# Patient Record
Sex: MALE | Race: WHITE | Employment: UNEMPLOYED | URBAN - METROPOLITAN AREA
[De-identification: names, ages, dates, MRNs, and addresses within clinical notes are randomized per-mention and may not be internally consistent; named-entity substitution may affect disease eponyms.]

---

## 2018-06-17 PROBLEM — R41.82 MENTAL STATUS CHANGE: Status: ACTIVE | Noted: 2018-06-17

## 2019-06-22 ENCOUNTER — APPOINTMENT (OUTPATIENT)
Dept: CT IMAGING | Age: 84
End: 2019-06-22
Payer: COMMERCIAL

## 2019-06-22 ENCOUNTER — HOSPITAL ENCOUNTER (EMERGENCY)
Age: 84
Discharge: HOME | End: 2019-06-22
Payer: COMMERCIAL

## 2019-06-22 VITALS
TEMPERATURE: 97.5 F | BODY MASS INDEX: 27.92 KG/M2 | RESPIRATION RATE: 16 BRPM | HEIGHT: 70 IN | SYSTOLIC BLOOD PRESSURE: 186 MMHG | WEIGHT: 195 LBS | OXYGEN SATURATION: 98 % | HEART RATE: 67 BPM | DIASTOLIC BLOOD PRESSURE: 99 MMHG

## 2019-06-22 DIAGNOSIS — R93.5 ABNORMAL ABDOMINAL CT SCAN: ICD-10-CM

## 2019-06-22 DIAGNOSIS — R31.9 PAINLESS HEMATURIA: Primary | ICD-10-CM

## 2019-06-22 LAB
ALBUMIN SERPL-MCNC: 3.5 GM/DL (ref 3.4–5)
ALP BLD-CCNC: 83 IU/L (ref 40–129)
ALT SERPL-CCNC: 15 U/L (ref 10–40)
ANION GAP SERPL CALCULATED.3IONS-SCNC: 10 MMOL/L (ref 4–16)
APTT: 32.7 SECONDS (ref 21.2–33)
AST SERPL-CCNC: 34 IU/L (ref 15–37)
BACTERIA: NEGATIVE /HPF
BASOPHILS ABSOLUTE: 0 K/CU MM
BASOPHILS RELATIVE PERCENT: 0.4 % (ref 0–1)
BILIRUB SERPL-MCNC: 1.4 MG/DL (ref 0–1)
BILIRUBIN URINE: NEGATIVE MG/DL
BLOOD, URINE: ABNORMAL
BUN BLDV-MCNC: 13 MG/DL (ref 6–23)
CALCIUM SERPL-MCNC: 9.2 MG/DL (ref 8.3–10.6)
CHLORIDE BLD-SCNC: 98 MMOL/L (ref 99–110)
CLARITY: CLEAR
CO2: 27 MMOL/L (ref 21–32)
COLOR: YELLOW
CREAT SERPL-MCNC: 1.1 MG/DL (ref 0.9–1.3)
DIFFERENTIAL TYPE: ABNORMAL
EOSINOPHILS ABSOLUTE: 0.2 K/CU MM
EOSINOPHILS RELATIVE PERCENT: 3.1 % (ref 0–3)
GFR AFRICAN AMERICAN: >60 ML/MIN/1.73M2
GFR NON-AFRICAN AMERICAN: >60 ML/MIN/1.73M2
GLUCOSE BLD-MCNC: 97 MG/DL (ref 70–99)
GLUCOSE, URINE: NEGATIVE MG/DL
HCT VFR BLD CALC: 37.4 % (ref 42–52)
HEMOGLOBIN: 12.2 GM/DL (ref 13.5–18)
HYALINE CASTS: 10 /LPF
IMMATURE NEUTROPHIL %: 1 % (ref 0–0.43)
INR BLD: 1.2 INDEX
KETONES, URINE: NEGATIVE MG/DL
LEUKOCYTE ESTERASE, URINE: NEGATIVE
LYMPHOCYTES ABSOLUTE: 2.2 K/CU MM
LYMPHOCYTES RELATIVE PERCENT: 31.3 % (ref 24–44)
MCH RBC QN AUTO: 33.2 PG (ref 27–31)
MCHC RBC AUTO-ENTMCNC: 32.6 % (ref 32–36)
MCV RBC AUTO: 101.9 FL (ref 78–100)
MONOCYTES ABSOLUTE: 0.7 K/CU MM
MONOCYTES RELATIVE PERCENT: 9.3 % (ref 0–4)
MUCUS: ABNORMAL HPF
NITRITE URINE, QUANTITATIVE: NEGATIVE
NUCLEATED RBC %: 1.6 %
PDW BLD-RTO: 19.5 % (ref 11.7–14.9)
PH, URINE: 5 (ref 5–8)
PLATELET # BLD: 259 K/CU MM (ref 140–440)
PMV BLD AUTO: 9.8 FL (ref 7.5–11.1)
POTASSIUM SERPL-SCNC: 4.9 MMOL/L (ref 3.5–5.1)
PROTEIN UA: NEGATIVE MG/DL
PROTHROMBIN TIME: 13.9 SECONDS (ref 9.12–12.5)
RBC # BLD: 3.67 M/CU MM (ref 4.6–6.2)
RBC URINE: 461 /HPF (ref 0–3)
REASON FOR REJECTION: NORMAL
REASON FOR REJECTION: NORMAL
REJECTED TEST: NORMAL
SEGMENTED NEUTROPHILS ABSOLUTE COUNT: 3.8 K/CU MM
SEGMENTED NEUTROPHILS RELATIVE PERCENT: 54.9 % (ref 36–66)
SODIUM BLD-SCNC: 135 MMOL/L (ref 135–145)
SPECIFIC GRAVITY UA: 1.01 (ref 1–1.03)
TOTAL IMMATURE NEUTOROPHIL: 0.07 K/CU MM
TOTAL NUCLEATED RBC: 0.1 K/CU MM
TOTAL PROTEIN: 6.6 GM/DL (ref 6.4–8.2)
TRICHOMONAS: ABNORMAL /HPF
UROBILINOGEN, URINE: NORMAL MG/DL (ref 0.2–1)
WBC # BLD: 7 K/CU MM (ref 4–10.5)
WBC UA: 2 /HPF (ref 0–2)

## 2019-06-22 PROCEDURE — 74177 CT ABD & PELVIS W/CONTRAST: CPT

## 2019-06-22 PROCEDURE — 80053 COMPREHEN METABOLIC PANEL: CPT

## 2019-06-22 PROCEDURE — 81001 URINALYSIS AUTO W/SCOPE: CPT

## 2019-06-22 PROCEDURE — 6360000004 HC RX CONTRAST MEDICATION: Performed by: PHYSICIAN ASSISTANT

## 2019-06-22 PROCEDURE — 85610 PROTHROMBIN TIME: CPT

## 2019-06-22 PROCEDURE — 87077 CULTURE AEROBIC IDENTIFY: CPT

## 2019-06-22 PROCEDURE — 87086 URINE CULTURE/COLONY COUNT: CPT

## 2019-06-22 PROCEDURE — 99284 EMERGENCY DEPT VISIT MOD MDM: CPT

## 2019-06-22 PROCEDURE — 2580000003 HC RX 258: Performed by: PHYSICIAN ASSISTANT

## 2019-06-22 PROCEDURE — 85730 THROMBOPLASTIN TIME PARTIAL: CPT

## 2019-06-22 PROCEDURE — 85025 COMPLETE CBC W/AUTO DIFF WBC: CPT

## 2019-06-22 RX ORDER — SODIUM CHLORIDE 9 MG/ML
INJECTION, SOLUTION INTRAVENOUS CONTINUOUS
Status: DISCONTINUED | OUTPATIENT
Start: 2019-06-22 | End: 2019-06-22 | Stop reason: HOSPADM

## 2019-06-22 RX ORDER — SODIUM CHLORIDE 0.9 % (FLUSH) 0.9 %
10 SYRINGE (ML) INJECTION PRN
Status: DISCONTINUED | OUTPATIENT
Start: 2019-06-22 | End: 2019-06-22 | Stop reason: HOSPADM

## 2019-06-22 RX ADMIN — IOPAMIDOL 75 ML: 755 INJECTION, SOLUTION INTRAVENOUS at 17:06

## 2019-06-22 RX ADMIN — SODIUM CHLORIDE: 9 INJECTION, SOLUTION INTRAVENOUS at 16:48

## 2019-06-22 NOTE — ED PROVIDER NOTES
eMERGENCY dEPARTMENT eNCOUnter         [address] [facility] [place]     PCP: Humera Horta MD    CHIEF COMPLAINT    Chief Complaint   Patient presents with    Hematuria     onset approx one hour ago; denies any pain or difficulty with urination ; denies any flank/back or abdominal pain; denies history of kidney stones       HPI    Cem Arevalo is a 80 y.o. male who presents with wife for painless hematuria. Onset was prior to arrival.  Context is patient currently wears depends not for incontinence issues but rather  for leakage protection as he is on a diuretic and was having difficulty making it to the bathroom in time with increased frequency of urination. Wife states that she noted bright red blood without clots in the front of depends. States that she does not believe the bleeding came from the rectum as she was able to place a small Q-tip to the outer urethral meatus and noted bright red blood on the Q-tip. Patient is denying any black tarry stools, bright red blood per rectum and is otherwise having normal bowel movements. Patient states that he has no pain including no dysuria, flank pain or other abdominal pain. He has a history of enlarged prostate and is on Flomax and follows with Dr. Cole Dominguez but no known history of kidney stones or bladder abnormalities. Patient is on Eliquis. Denying any fever or chills, chest pain shortness of breath dizziness or lightheadedness. REVIEW OF SYSTEMS    Constitutional:  Denies fever, chills, weight loss or weakness   HENT:  Denies sore throat or ear pain   Cardiovascular:  Denies chest pain, palpitations or swelling   Respiratory:  Denies cough or shortness of breath   GI:  See HPI above  : See HPI  Musculoskeletal:  Denies back pain or groin pain or masses. No pain or swelling of extremities.   Skin:  Denies rash  Neurologic:  Denies headache, focal weakness or sensory changes   Endocrine:  Denies polyuria or polydypsia   Lymphatic:  Denies swollen glands     All other review of systems are negative  See HPI and nursing notes for additional information     PAST MEDICAL & SURGICAL HISTORY    Past Medical History:   Diagnosis Date    Anesthesia complication     becomes combative and confused s/p anesthesia. Needed a strait jacket s/p back surgery.  Arthritis     hands, back    Atrial fibrillation (HCC)     Back pain     Blood transfusion     CAD (coronary artery disease)     Cancer (HCC)     Top of head-frozen off    Histoplasmosis     x2 last time in 2005    History of blood transfusion     Hyperlipidemia     Hypertension     Nerve damage     bilat feet s/p back surgery     Past Surgical History:   Procedure Laterality Date    APPENDECTOMY      BACK SURGERY  Last done 2007    x 2    CHOLECYSTECTOMY  12/15/14    laparoscopic    COLONOSCOPY  2009    MITRAL VALVE SURGERY      OTHER SURGICAL HISTORY  06 13 2013    lap appy    SKIN GRAFT  1950's    s/p gresham    TONSILLECTOMY  15years old       CURRENT MEDICATIONS    Current Outpatient Rx   Medication Sig Dispense Refill    donepezil (ARICEPT) 5 MG tablet Take 5 mg by mouth nightly      furosemide (LASIX) 20 MG tablet Take 20 mg by mouth every morning      potassium chloride (KLOR-CON M) 10 MEQ extended release tablet Take 10 mEq by mouth every morning      midodrine (PROAMATINE) 5 MG tablet Take 1 tablet by mouth 3 times daily 90 tablet 3    cholestyramine (QUESTRAN) 4 G packet Take 1 packet by mouth 2 times daily for 14 days 28 packet 0    pyridostigmine (MESTINON) 60 MG tablet Take 60 mg by mouth 3 times daily      aspirin 81 MG chewable tablet Take 1 tablet by mouth daily 30 tablet 3    dicyclomine (BENTYL) 10 MG capsule Take 1 capsule by mouth 3 times daily (before meals) 90 capsule 3    traMADol (ULTRAM) 50 MG tablet 1 tab three times a day and also 1 tab q8h prn for moderate to severe leg pain .  ( max 5 tablets per day) (Patient taking differently: every 6 hours as needed. 1 tab three times a day and also 1 tab q8h prn for moderate to severe leg pain . ( max 5 tablets per day). ) 40 tablet 0    amitriptyline (ELAVIL) 10 MG tablet Take 20 mg by mouth nightly.  gabapentin (NEURONTIN) 300 MG capsule Take 400 mg by mouth nightly. At bedtime.  tamsulosin (FLOMAX) 0.4 MG capsule Take 0.4 mg by mouth daily.  sotalol (BETAPACE) 80 MG tablet Take 40 mg by mouth 2 times daily.  Multiple Vitamin (MULTIVITAMIN PO) Take 1 tablet by mouth daily.            ALLERGIES    Allergies   Allergen Reactions    Demerol Other (See Comments)     Low blood pressure    Erythromycin Other (See Comments)     Blurred vision    Zithromax [Azithromycin Dihydrate] Other (See Comments)     Patient is unsure       SOCIAL AND FAMILY HISTORY    Social History     Socioeconomic History    Marital status:      Spouse name: None    Number of children: 4    Years of education: None    Highest education level: None   Occupational History    None   Social Needs    Financial resource strain: None    Food insecurity:     Worry: None     Inability: None    Transportation needs:     Medical: None     Non-medical: None   Tobacco Use    Smoking status: Never Smoker    Smokeless tobacco: Never Used   Substance and Sexual Activity    Alcohol use: No    Drug use: No    Sexual activity: Never     Partners: Female   Lifestyle    Physical activity:     Days per week: None     Minutes per session: None    Stress: None   Relationships    Social connections:     Talks on phone: None     Gets together: None     Attends Gnosticist service: None     Active member of club or organization: None     Attends meetings of clubs or organizations: None     Relationship status: None    Intimate partner violence:     Fear of current or ex partner: None     Emotionally abused: None     Physically abused: None     Forced sexual activity: None   Other Topics Concern    None   Social History Narrative    Do you donate blood or plasma? Caffeine intake? Advance directive? Is blood transfusion acceptable in an emergency? Family History   Problem Relation Age of Onset    Heart Disease Mother         CHF    Other Father         poss joslyn fonseca    Other Sister         staph infection s/p back surgery    Heart Disease Sister         CHF    Cancer Brother         lower spine, bladder    Heart Disease Brother     Other Daughter         HX brain hemmorage       PHYSICAL EXAM    VITAL SIGNS: BP (!) 186/99   Pulse 67   Temp 97.5 °F (36.4 °C) (Oral)   Resp 16   Ht 5' 10\" (1.778 m)   Wt 195 lb (88.5 kg)   SpO2 98%   BMI 27.98 kg/m²   General:  Well developed, well nourished, In no acute distress  Eyes:  Sclera nonicteric, Conjunctiva moist, No discharge  Head:  Normocephalic, Atramautic  Neck/Lymphatics: Supple, no JVD, no swollen nodes  Respiratory:  Clear to ausculation bilaterally, No retractions, Non labored breathing  Cardiovascular:  RRR, Normal S1/S2  GI:  No gross discoloration. Bowel sounds present in all quadrants, No audible bruits. Soft,  Nondistended. No abdominal or suprapubic abdominal tenderness and without rebound tenderness or guarding, No palpable pulsatile masses or obvious hernias. Genitourinary: (chaperoned by wife at bedside)  Circumcised. Currently in a depends with noted bloody urine. Penile lesions are absent. There is no urethral discharge or bleeding or obvious lacerations to the urethral meatus. There is no penile erythema, edema, or deformity. There is no scrotal erythema, edema, masses, or tenderness. Back:   No CVA tenderness to percussion.   Musculoskeletal:  No edema, No deformity  Peripheral Vascular: Distal pulses 2+ equal bilaterally  Integument: No rash, Normal turgor  Neurologic:  Alert & oriented, Normal speech  Psychiatric: Cooperative, pleasant affect       I have reviewed and interpreted all of the currently available lab results from this visit (if applicable):  Results for orders placed or performed during the hospital encounter of 06/22/19   CBC auto diff   Result Value Ref Range    WBC 7.0 4.0 - 10.5 K/CU MM    RBC 3.67 (L) 4.6 - 6.2 M/CU MM    Hemoglobin 12.2 (L) 13.5 - 18.0 GM/DL    Hematocrit 37.4 (L) 42 - 52 %    .9 (H) 78 - 100 FL    MCH 33.2 (H) 27 - 31 PG    MCHC 32.6 32.0 - 36.0 %    RDW 19.5 (H) 11.7 - 14.9 %    Platelets 770 074 - 667 K/CU MM    MPV 9.8 7.5 - 11.1 FL    Differential Type AUTOMATED DIFFERENTIAL     Segs Relative 54.9 36 - 66 %    Lymphocytes % 31.3 24 - 44 %    Monocytes % 9.3 (H) 0 - 4 %    Eosinophils % 3.1 (H) 0 - 3 %    Basophils % 0.4 0 - 1 %    Segs Absolute 3.8 K/CU MM    Lymphocytes # 2.2 K/CU MM    Monocytes # 0.7 K/CU MM    Eosinophils # 0.2 K/CU MM    Basophils # 0.0 K/CU MM    Nucleated RBC % 1.6 %    Total Nucleated RBC 0.1 K/CU MM    Total Immature Neutrophil 0.07 K/CU MM    Immature Neutrophil % 1.0 (H) 0 - 0.43 %   Urinalysis   Result Value Ref Range    Color, UA YELLOW YELLOW    Clarity, UA CLEAR CLEAR    Glucose, Urine NEGATIVE NEGATIVE MG/DL    Bilirubin Urine NEGATIVE NEGATIVE MG/DL    Ketones, Urine NEGATIVE NEGATIVE MG/DL    Specific Gravity, UA 1.010 1.001 - 1.035    Blood, Urine LARGE (A) NEGATIVE    pH, Urine 5.0 5.0 - 8.0    Protein, UA NEGATIVE NEGATIVE MG/DL    Urobilinogen, Urine NORMAL 0.2 - 1.0 MG/DL    Nitrite Urine, Quantitative NEGATIVE NEGATIVE    Leukocyte Esterase, Urine NEGATIVE NEGATIVE    RBC,  (H) 0 - 3 /HPF    WBC, UA 2 0 - 2 /HPF    Bacteria, UA NEGATIVE NEGATIVE /HPF    Mucus, UA RARE (A) NEGATIVE HPF    Trichomonas, UA NONE SEEN NONE SEEN /HPF    Hyaline Casts, UA 10 /LPF   Protime/INR & PTT   Result Value Ref Range    Protime 13.9 (H) 9.12 - 12.5 SECONDS    INR 1.20 INDEX    aPTT 32.7 21.2 - 33.0 SECONDS   SPECIMEN REJECTION   Result Value Ref Range    Rejected Test CHEMISTRY     Reason for Rejection UNABLE TO PERFORM TESTING:     Reason for Rejection SPECIMEN HEMOLYZED    Comprehensive Metabolic Panel   Result Value Ref Range    Sodium 135 135 - 145 MMOL/L    Potassium 4.9 3.5 - 5.1 MMOL/L    Chloride 98 (L) 99 - 110 mMol/L    CO2 27 21 - 32 MMOL/L    BUN 13 6 - 23 MG/DL    CREATININE 1.1 0.9 - 1.3 MG/DL    Glucose 97 70 - 99 MG/DL    Calcium 9.2 8.3 - 10.6 MG/DL    Alb 3.5 3.4 - 5.0 GM/DL    Total Protein 6.6 6.4 - 8.2 GM/DL    Total Bilirubin 1.4 (H) 0.0 - 1.0 MG/DL    ALT 15 10 - 40 U/L    AST 34 15 - 37 IU/L    Alkaline Phosphatase 83 40 - 129 IU/L    GFR Non-African American >60 >60 mL/min/1.73m2    GFR African American >60 >60 mL/min/1.73m2    Anion Gap 10 4 - 16        RADIOLOGY/PROCEDURES       CT ABDOMEN PELVIS W IV CONTRAST (Final result)   Result time 06/22/19 18:23:18   Final result by Myah Browning MD (06/22/19 18:23:18)                Impression:    1. No acute abnormality in the abdomen or pelvis. 2. Mildly enlarged distal right paraesophageal lymph nodes of uncertain  etiology new from 2017.  3. Status post appendectomy and cholecystectomy. 4. Colonic diverticulosis. 5. Prostatomegaly. Narrative:    EXAMINATION:  CT OF THE ABDOMEN AND PELVIS WITH CONTRAST 6/22/2019 5:05 pm    TECHNIQUE:  CT of the abdomen and pelvis was performed with the administration of  intravenous contrast. Multiplanar reformatted images are provided for review. Dose modulation, iterative reconstruction, and/or weight based adjustment of  the mA/kV was utilized to reduce the radiation dose to as low as reasonably  achievable. COMPARISON:  CT abdomen and pelvis 06/17/2018. HISTORY:  ORDERING SYSTEM PROVIDED HISTORY: painless hematuria  TECHNOLOGIST PROVIDED HISTORY:  IV contrast only. Thank you.   Ordering Physician Provided Reason for Exam: painless hematuria  Acuity: Acute  Type of Exam: Initial  Additional signs and symptoms: NONE  Relevant Medical/Surgical History: 75 ML ISOVUE 370    FINDINGS:  Lower Chest: Mildly enlarged right paraesophageal lymph node measuring  approximately 11 mm in short axis dimension on axial image 7 this is new from  2017.  Mild bibasilar atelectasis. Organs: Small 8 mm focus of hyperattenuation in the right hepatic lobe on  axial image 32 without significant change from at least 2017.  Status post  cholecystectomy.  The biliary tree is within normal postoperative limits. The pancreas, spleen, and bilateral adrenal glands are unremarkable.  19 mm  right renal cyst.  The left kidney is normal in appearance.  No obstructive  uropathy. GI/Bowel: Status post appendectomy.  Mild colonic diverticulosis.  No bowel  obstruction or abnormal bowel wall thickening. Pelvis: The urinary bladder is normal in appearance.  Mild prostatomegaly. No free fluid in the pelvis.  No pelvic or inguinal lymphadenopathy. Peritoneum/Retroperitoneum: The abdominal aorta is normal in caliber with  moderate atherosclerotic vascular disease.  No retroperitoneal or mesenteric  lymphadenopathy is identified.  No free air or fluid is seen in the abdomen. Bones/Soft Tissues: Battery pack in the left lower back subcutaneous fat with  spinal stimulator leads partially visualized.  No acute osseous or soft  tissue abnormality. ED COURSE & MEDICAL DECISION MAKING      Vital signs and nursing notes reviewed during ED course. I have independently evaluated this patient . Supervising MD - Dr J Luis Ross - present in the Emergency Department, available for consultation, throughout entirety of  patient care. All pertinent Lab data and radiographic results reviewed with patient at bedside. The patient and / or the family were informed of the results of any tests, a time was given to answer questions, a plan was proposed and they agreed with plan.          Differential diagnosis: Abdominal Aortic Aneurysm, Ischemic Bowel, Bowel Obstruction, Acute Cholecystitis, Acute Appendicitis, other    Clinical IMPRESSION    1. Painless hematuria    2. Abnormal abdominal CT scan        Patient presents with wife with concern for painless hematuria episode x1 today. On exam, well-appearing 80-year-old male, afebrile nontoxic, appears hemodynamically intact. Abdominal exam is nontender/nonsurgical.  No CVA tenderness percussion. There is noted bloody urine in the front of the patient's depends without active urethral discharge or bleeding. No  abnormalities for urethral injury, no scrotal edema or tenderness. IV line was established patient started on gentle IV fluids. No leukocytosis. Hemoglobin is stable at 12.2 with a normal INR. CMP without significant derangement other than a mildly elevated bilirubin of 1.4 which is baseline for patient. Otherwise normal BUN and creatinine. UA shows large gross blood, 461 red blood cells without leukocytes nitrites or bacteria. Urine culture pending. CT abdomen pelvis with IV contrast reveals no acute abdominal pelvic process. There are mildly enlarged distal right paraesophageal lymph nodes of uncertain etiology new from 2017 with prostamegaly. No other evidence of ureteral obstruction or bladder abnormality. On reassessment, patient does state that while voiding in the ED to provide UA sample, he did have an episode of dysuria  which resolved after urination. Did not have any further episodes of dysuria. Urine sample was provided prior to CT scan and I suspect that he may have passed a small ureteral stone that was no longer seen on CT however discussed with patient and wife close follow-up with urology if hematuria persists as he may benefit from cystoscopy. Patient/surrogate and I discussed the incidental findings noted on imaging studies today - paraesophageal lymph nodes. We discussed the importance of timely outpatient follow-up for further evaluation and management as cannot completely rule out a malignant lesion or process.  He or she understands that failure to follow up could increase risk of delayed diagnosis and potentially worsening condition. I estimate there is low risk for acute appendicitis, bowel obstruction, cholecystitis, ruptured diverticulitis, incarcerated hernia, hemorrhagic pancreatitis, or perforated bowel/ulcer, thus I consider the discharge disposition reasonable. At this time, patient is discharged in stable condition with the above follow-up. Return warnings back to the ED discussed for any new or worsening symptoms including increasing dysuria, fever, urinary retention or gross urethral hemorrhage. I discussed the unclear etiology of patient's symptoms today and the need for return to emergency departmen for repeat evaluation, sooner if symptoms worsen or any new symptoms develop. Patient agrees to return emergency department if symptoms worsen or any new symptoms develop. Comment: Please note this report has been produced using speech recognition software and may contain errors related to that system including errors in grammar, punctuation, and spelling, as well as words and phrases that may be inappropriate. If there are any questions or concerns please feel free to contact the dictating provider for clarification.           Cory Wei PA-C  06/22/19 2010

## 2019-06-22 NOTE — ED NOTES
Patient resting quietly on cart in position of comfort. Family at bedside. Ice water provided.       Beatriz Shafer RN  06/22/19 [id number]

## 2019-06-23 NOTE — ED NOTES
Patient and family updated on wait for CT report. Denies needs.       Kenton Ewing RN  06/22/19 2000

## 2019-06-24 LAB
CULTURE: ABNORMAL
Lab: ABNORMAL
SPECIMEN: ABNORMAL
TOTAL COLONY COUNT: ABNORMAL

## 2019-07-31 ENCOUNTER — HOSPITAL ENCOUNTER (INPATIENT)
Age: 84
LOS: 5 days | Discharge: HOME HEALTH | DRG: 871 | End: 2019-08-05
Attending: EMERGENCY MEDICINE | Admitting: GENERAL PRACTICE
Payer: COMMERCIAL

## 2019-07-31 ENCOUNTER — APPOINTMENT (OUTPATIENT)
Dept: CT IMAGING | Age: 84
DRG: 871 | End: 2019-07-31
Payer: COMMERCIAL

## 2019-07-31 ENCOUNTER — APPOINTMENT (OUTPATIENT)
Dept: GENERAL RADIOLOGY | Age: 84
DRG: 871 | End: 2019-07-31
Payer: COMMERCIAL

## 2019-07-31 DIAGNOSIS — R79.89 ELEVATED BRAIN NATRIURETIC PEPTIDE (BNP) LEVEL: ICD-10-CM

## 2019-07-31 DIAGNOSIS — R53.83 FATIGUE, UNSPECIFIED TYPE: ICD-10-CM

## 2019-07-31 DIAGNOSIS — R41.82 ALTERED MENTAL STATUS, UNSPECIFIED ALTERED MENTAL STATUS TYPE: Primary | ICD-10-CM

## 2019-07-31 DIAGNOSIS — D72.829 LEUKOCYTOSIS, UNSPECIFIED TYPE: ICD-10-CM

## 2019-07-31 PROBLEM — A41.9 SEPSIS (HCC): Status: ACTIVE | Noted: 2019-07-31

## 2019-07-31 LAB
ALBUMIN SERPL-MCNC: 3.7 GM/DL (ref 3.4–5)
ALBUMIN SERPL-MCNC: 3.8 GM/DL (ref 3.4–5)
ALP BLD-CCNC: 70 IU/L (ref 40–128)
ALP BLD-CCNC: 78 IU/L (ref 40–128)
ALT SERPL-CCNC: 13 U/L (ref 10–40)
ALT SERPL-CCNC: 16 U/L (ref 10–40)
AMMONIA: 28 UMOL/L (ref 16–60)
ANION GAP SERPL CALCULATED.3IONS-SCNC: 15 MMOL/L (ref 4–16)
ANION GAP SERPL CALCULATED.3IONS-SCNC: 16 MMOL/L (ref 4–16)
AST SERPL-CCNC: 24 IU/L (ref 15–37)
AST SERPL-CCNC: 28 IU/L (ref 15–37)
BACTERIA: ABNORMAL /HPF
BASOPHILS ABSOLUTE: 0.1 K/CU MM
BASOPHILS RELATIVE PERCENT: 0.4 % (ref 0–1)
BILIRUB SERPL-MCNC: 1.5 MG/DL (ref 0–1)
BILIRUB SERPL-MCNC: 1.6 MG/DL (ref 0–1)
BILIRUBIN URINE: NEGATIVE MG/DL
BLOOD, URINE: ABNORMAL
BUN BLDV-MCNC: 11 MG/DL (ref 6–23)
BUN BLDV-MCNC: 11 MG/DL (ref 6–23)
CALCIUM SERPL-MCNC: 9 MG/DL (ref 8.3–10.6)
CALCIUM SERPL-MCNC: 9.5 MG/DL (ref 8.3–10.6)
CHLORIDE BLD-SCNC: 100 MMOL/L (ref 99–110)
CHLORIDE BLD-SCNC: 101 MMOL/L (ref 99–110)
CHP ED QC CHECK: YES
CLARITY: ABNORMAL
CO2: 20 MMOL/L (ref 21–32)
CO2: 23 MMOL/L (ref 21–32)
COLOR: YELLOW
CREAT SERPL-MCNC: 1.1 MG/DL (ref 0.9–1.3)
CREAT SERPL-MCNC: 1.2 MG/DL (ref 0.9–1.3)
DIFFERENTIAL TYPE: ABNORMAL
EOSINOPHILS ABSOLUTE: 0 K/CU MM
EOSINOPHILS RELATIVE PERCENT: 0.1 % (ref 0–3)
GFR AFRICAN AMERICAN: >60 ML/MIN/1.73M2
GFR AFRICAN AMERICAN: >60 ML/MIN/1.73M2
GFR NON-AFRICAN AMERICAN: 58 ML/MIN/1.73M2
GFR NON-AFRICAN AMERICAN: >60 ML/MIN/1.73M2
GLUCOSE BLD-MCNC: 173 MG/DL (ref 70–99)
GLUCOSE BLD-MCNC: 186 MG/DL (ref 70–99)
GLUCOSE BLD-MCNC: 189 MG/DL
GLUCOSE BLD-MCNC: 189 MG/DL (ref 70–99)
GLUCOSE BLD-MCNC: 191 MG/DL (ref 70–99)
GLUCOSE BLD-MCNC: 233 MG/DL (ref 70–99)
GLUCOSE, URINE: NEGATIVE MG/DL
HCT VFR BLD CALC: 40.6 % (ref 42–52)
HEMOGLOBIN: 13 GM/DL (ref 13.5–18)
IMMATURE NEUTROPHIL %: 0.9 % (ref 0–0.43)
INR BLD: 1.17 INDEX
KETONES, URINE: NEGATIVE MG/DL
LACTIC ACID, SEPSIS: 3.5 MMOL/L (ref 0.5–1.9)
LACTIC ACID, SEPSIS: 3.5 MMOL/L (ref 0.5–1.9)
LACTIC ACID, SEPSIS: 4.3 MMOL/L (ref 0.5–1.9)
LACTIC ACID, SEPSIS: ABNORMAL MMOL/L (ref 0.5–1.9)
LEUKOCYTE ESTERASE, URINE: ABNORMAL
LYMPHOCYTES ABSOLUTE: 0.6 K/CU MM
LYMPHOCYTES RELATIVE PERCENT: 4.5 % (ref 24–44)
MCH RBC QN AUTO: 33.2 PG (ref 27–31)
MCHC RBC AUTO-ENTMCNC: 32 % (ref 32–36)
MCV RBC AUTO: 103.6 FL (ref 78–100)
MONOCYTES ABSOLUTE: 0.6 K/CU MM
MONOCYTES RELATIVE PERCENT: 4.1 % (ref 0–4)
NITRITE URINE, QUANTITATIVE: POSITIVE
NUCLEATED RBC %: 0.7 %
PDW BLD-RTO: 19.5 % (ref 11.7–14.9)
PH, URINE: 6 (ref 5–8)
PLATELET # BLD: 217 K/CU MM (ref 140–440)
PMV BLD AUTO: 9.2 FL (ref 7.5–11.1)
POTASSIUM SERPL-SCNC: 4.1 MMOL/L (ref 3.5–5.1)
POTASSIUM SERPL-SCNC: 4.4 MMOL/L (ref 3.5–5.1)
PRO-BNP: 1876 PG/ML
PROTEIN UA: 30 MG/DL
PROTHROMBIN TIME: 13.5 SECONDS (ref 9.12–12.5)
RBC # BLD: 3.92 M/CU MM (ref 4.6–6.2)
RBC URINE: 2191 /HPF (ref 0–3)
REASON FOR REJECTION: NORMAL
REASON FOR REJECTION: NORMAL
REJECTED TEST: NORMAL
SEGMENTED NEUTROPHILS ABSOLUTE COUNT: 12.1 K/CU MM
SEGMENTED NEUTROPHILS RELATIVE PERCENT: 90 % (ref 36–66)
SODIUM BLD-SCNC: 137 MMOL/L (ref 135–145)
SODIUM BLD-SCNC: 138 MMOL/L (ref 135–145)
SPECIFIC GRAVITY UA: 1.01 (ref 1–1.03)
TOTAL IMMATURE NEUTOROPHIL: 0.12 K/CU MM
TOTAL NUCLEATED RBC: 0.1 K/CU MM
TOTAL PROTEIN: 6 GM/DL (ref 6.4–8.2)
TOTAL PROTEIN: 7 GM/DL (ref 6.4–8.2)
TRICHOMONAS: ABNORMAL /HPF
TROPONIN T: <0.01 NG/ML
UROBILINOGEN, URINE: NORMAL MG/DL (ref 0.2–1)
WBC # BLD: 13.5 K/CU MM (ref 4–10.5)
WBC UA: 4 /HPF (ref 0–2)

## 2019-07-31 PROCEDURE — 84484 ASSAY OF TROPONIN QUANT: CPT

## 2019-07-31 PROCEDURE — 2580000003 HC RX 258: Performed by: GENERAL PRACTICE

## 2019-07-31 PROCEDURE — 83880 ASSAY OF NATRIURETIC PEPTIDE: CPT

## 2019-07-31 PROCEDURE — 36415 COLL VENOUS BLD VENIPUNCTURE: CPT

## 2019-07-31 PROCEDURE — 81001 URINALYSIS AUTO W/SCOPE: CPT

## 2019-07-31 PROCEDURE — 6370000000 HC RX 637 (ALT 250 FOR IP): Performed by: GENERAL PRACTICE

## 2019-07-31 PROCEDURE — 80053 COMPREHEN METABOLIC PANEL: CPT

## 2019-07-31 PROCEDURE — 96365 THER/PROPH/DIAG IV INF INIT: CPT

## 2019-07-31 PROCEDURE — 6360000002 HC RX W HCPCS: Performed by: PHYSICIAN ASSISTANT

## 2019-07-31 PROCEDURE — 85025 COMPLETE CBC W/AUTO DIFF WBC: CPT

## 2019-07-31 PROCEDURE — 71045 X-RAY EXAM CHEST 1 VIEW: CPT

## 2019-07-31 PROCEDURE — 96361 HYDRATE IV INFUSION ADD-ON: CPT

## 2019-07-31 PROCEDURE — 6360000002 HC RX W HCPCS: Performed by: GENERAL PRACTICE

## 2019-07-31 PROCEDURE — 85610 PROTHROMBIN TIME: CPT

## 2019-07-31 PROCEDURE — 1200000000 HC SEMI PRIVATE

## 2019-07-31 PROCEDURE — 83605 ASSAY OF LACTIC ACID: CPT

## 2019-07-31 PROCEDURE — 82140 ASSAY OF AMMONIA: CPT

## 2019-07-31 PROCEDURE — 87040 BLOOD CULTURE FOR BACTERIA: CPT

## 2019-07-31 PROCEDURE — 93005 ELECTROCARDIOGRAM TRACING: CPT | Performed by: PHYSICIAN ASSISTANT

## 2019-07-31 PROCEDURE — 70450 CT HEAD/BRAIN W/O DYE: CPT

## 2019-07-31 PROCEDURE — 82962 GLUCOSE BLOOD TEST: CPT

## 2019-07-31 PROCEDURE — 2580000003 HC RX 258

## 2019-07-31 PROCEDURE — 99285 EMERGENCY DEPT VISIT HI MDM: CPT

## 2019-07-31 PROCEDURE — 2580000003 HC RX 258: Performed by: PHYSICIAN ASSISTANT

## 2019-07-31 RX ORDER — MIDODRINE HYDROCHLORIDE 5 MG/1
5 TABLET ORAL 3 TIMES DAILY
Status: DISCONTINUED | OUTPATIENT
Start: 2019-07-31 | End: 2019-07-31

## 2019-07-31 RX ORDER — METFORMIN HYDROCHLORIDE 500 MG/1
500 TABLET, EXTENDED RELEASE ORAL DAILY
Status: ON HOLD | COMMUNITY
End: 2020-07-10 | Stop reason: HOSPADM

## 2019-07-31 RX ORDER — DONEPEZIL HYDROCHLORIDE 5 MG/1
5 TABLET, FILM COATED ORAL NIGHTLY
Status: DISCONTINUED | OUTPATIENT
Start: 2019-07-31 | End: 2019-08-05 | Stop reason: HOSPADM

## 2019-07-31 RX ORDER — SODIUM CHLORIDE 0.9 % (FLUSH) 0.9 %
10 SYRINGE (ML) INJECTION EVERY 12 HOURS SCHEDULED
Status: DISCONTINUED | OUTPATIENT
Start: 2019-07-31 | End: 2019-07-31 | Stop reason: SDUPTHER

## 2019-07-31 RX ORDER — DICYCLOMINE HYDROCHLORIDE 10 MG/1
10 CAPSULE ORAL
Status: DISCONTINUED | OUTPATIENT
Start: 2019-07-31 | End: 2019-08-05 | Stop reason: HOSPADM

## 2019-07-31 RX ORDER — SODIUM CHLORIDE 0.9 % (FLUSH) 0.9 %
10 SYRINGE (ML) INJECTION EVERY 12 HOURS SCHEDULED
Status: DISCONTINUED | OUTPATIENT
Start: 2019-07-31 | End: 2019-08-05 | Stop reason: HOSPADM

## 2019-07-31 RX ORDER — MEMANTINE HYDROCHLORIDE 10 MG/1
10 TABLET ORAL 2 TIMES DAILY
Refills: 6 | COMMUNITY
Start: 2019-07-22

## 2019-07-31 RX ORDER — GABAPENTIN 400 MG/1
800 CAPSULE ORAL NIGHTLY
Status: ON HOLD | COMMUNITY
End: 2020-07-10 | Stop reason: HOSPADM

## 2019-07-31 RX ORDER — SOTALOL HYDROCHLORIDE 80 MG/1
40 TABLET ORAL 2 TIMES DAILY
Status: DISCONTINUED | OUTPATIENT
Start: 2019-07-31 | End: 2019-08-05 | Stop reason: HOSPADM

## 2019-07-31 RX ORDER — 0.9 % SODIUM CHLORIDE 0.9 %
500 INTRAVENOUS SOLUTION INTRAVENOUS ONCE
Status: COMPLETED | OUTPATIENT
Start: 2019-07-31 | End: 2019-07-31

## 2019-07-31 RX ORDER — AMITRIPTYLINE HYDROCHLORIDE 10 MG/1
20 TABLET, FILM COATED ORAL NIGHTLY
Status: DISCONTINUED | OUTPATIENT
Start: 2019-07-31 | End: 2019-08-05 | Stop reason: HOSPADM

## 2019-07-31 RX ORDER — POTASSIUM CHLORIDE 20 MEQ/1
10 TABLET, EXTENDED RELEASE ORAL EVERY MORNING
Status: DISCONTINUED | OUTPATIENT
Start: 2019-08-01 | End: 2019-08-05 | Stop reason: HOSPADM

## 2019-07-31 RX ORDER — OXYCODONE HYDROCHLORIDE 5 MG/1
7.5 TABLET ORAL 3 TIMES DAILY PRN
Status: DISCONTINUED | OUTPATIENT
Start: 2019-07-31 | End: 2019-08-05 | Stop reason: HOSPADM

## 2019-07-31 RX ORDER — NICOTINE POLACRILEX 4 MG
15 LOZENGE BUCCAL PRN
Status: DISCONTINUED | OUTPATIENT
Start: 2019-07-31 | End: 2019-08-05 | Stop reason: HOSPADM

## 2019-07-31 RX ORDER — DULOXETIN HYDROCHLORIDE 60 MG/1
60 CAPSULE, DELAYED RELEASE ORAL DAILY
Refills: 2 | Status: ON HOLD | COMMUNITY
Start: 2019-06-27 | End: 2020-07-10 | Stop reason: HOSPADM

## 2019-07-31 RX ORDER — ACETAMINOPHEN 325 MG/1
650 TABLET ORAL EVERY 4 HOURS PRN
Status: DISCONTINUED | OUTPATIENT
Start: 2019-07-31 | End: 2019-07-31

## 2019-07-31 RX ORDER — OXYCODONE HYDROCHLORIDE 5 MG/1
7.5 TABLET ORAL 3 TIMES DAILY PRN
Refills: 0 | Status: ON HOLD | COMMUNITY
Start: 2019-07-26 | End: 2020-03-11 | Stop reason: SDUPTHER

## 2019-07-31 RX ORDER — GABAPENTIN 400 MG/1
400 CAPSULE ORAL
Status: ON HOLD | COMMUNITY
End: 2020-07-10 | Stop reason: HOSPADM

## 2019-07-31 RX ORDER — SODIUM CHLORIDE 9 MG/ML
INJECTION, SOLUTION INTRAVENOUS
Status: COMPLETED
Start: 2019-07-31 | End: 2019-07-31

## 2019-07-31 RX ORDER — TAMSULOSIN HYDROCHLORIDE 0.4 MG/1
0.4 CAPSULE ORAL DAILY
Status: DISCONTINUED | OUTPATIENT
Start: 2019-07-31 | End: 2019-08-05 | Stop reason: HOSPADM

## 2019-07-31 RX ORDER — GABAPENTIN 400 MG/1
400 CAPSULE ORAL NIGHTLY
Status: DISCONTINUED | OUTPATIENT
Start: 2019-07-31 | End: 2019-07-31

## 2019-07-31 RX ORDER — GABAPENTIN 400 MG/1
400 CAPSULE ORAL 3 TIMES DAILY
Status: DISCONTINUED | OUTPATIENT
Start: 2019-07-31 | End: 2019-08-03

## 2019-07-31 RX ORDER — MEMANTINE HYDROCHLORIDE 10 MG/1
10 TABLET ORAL 2 TIMES DAILY
Status: DISCONTINUED | OUTPATIENT
Start: 2019-07-31 | End: 2019-08-05 | Stop reason: HOSPADM

## 2019-07-31 RX ORDER — SODIUM CHLORIDE 0.9 % (FLUSH) 0.9 %
10 SYRINGE (ML) INJECTION PRN
Status: DISCONTINUED | OUTPATIENT
Start: 2019-07-31 | End: 2019-07-31 | Stop reason: SDUPTHER

## 2019-07-31 RX ORDER — SODIUM CHLORIDE 0.9 % (FLUSH) 0.9 %
10 SYRINGE (ML) INJECTION PRN
Status: DISCONTINUED | OUTPATIENT
Start: 2019-07-31 | End: 2019-08-05 | Stop reason: HOSPADM

## 2019-07-31 RX ORDER — DULOXETIN HYDROCHLORIDE 30 MG/1
30 CAPSULE, DELAYED RELEASE ORAL DAILY
Status: DISCONTINUED | OUTPATIENT
Start: 2019-08-01 | End: 2019-08-05 | Stop reason: HOSPADM

## 2019-07-31 RX ORDER — GABAPENTIN 400 MG/1
800 CAPSULE ORAL EVERY MORNING
Refills: 1 | COMMUNITY
Start: 2019-07-08 | End: 2019-11-18

## 2019-07-31 RX ORDER — ASPIRIN 81 MG/1
81 TABLET, CHEWABLE ORAL DAILY
Status: DISCONTINUED | OUTPATIENT
Start: 2019-07-31 | End: 2019-08-05 | Stop reason: HOSPADM

## 2019-07-31 RX ORDER — TRAMADOL HYDROCHLORIDE 50 MG/1
50 TABLET ORAL EVERY 6 HOURS PRN
Status: DISCONTINUED | OUTPATIENT
Start: 2019-07-31 | End: 2019-08-05 | Stop reason: HOSPADM

## 2019-07-31 RX ORDER — DEXTROSE MONOHYDRATE 25 G/50ML
12.5 INJECTION, SOLUTION INTRAVENOUS PRN
Status: DISCONTINUED | OUTPATIENT
Start: 2019-07-31 | End: 2019-08-05 | Stop reason: HOSPADM

## 2019-07-31 RX ORDER — SODIUM CHLORIDE 9 MG/ML
INJECTION, SOLUTION INTRAVENOUS CONTINUOUS
Status: DISCONTINUED | OUTPATIENT
Start: 2019-07-31 | End: 2019-08-02

## 2019-07-31 RX ORDER — PYRIDOSTIGMINE BROMIDE 60 MG/1
60 TABLET ORAL 3 TIMES DAILY
Status: DISCONTINUED | OUTPATIENT
Start: 2019-07-31 | End: 2019-07-31

## 2019-07-31 RX ORDER — ONDANSETRON 2 MG/ML
4 INJECTION INTRAMUSCULAR; INTRAVENOUS EVERY 6 HOURS PRN
Status: DISCONTINUED | OUTPATIENT
Start: 2019-07-31 | End: 2019-08-05 | Stop reason: HOSPADM

## 2019-07-31 RX ORDER — M-VIT,TX,IRON,MINS/CALC/FOLIC 27MG-0.4MG
1 TABLET ORAL DAILY
Status: DISCONTINUED | OUTPATIENT
Start: 2019-07-31 | End: 2019-08-05 | Stop reason: HOSPADM

## 2019-07-31 RX ORDER — ACETAMINOPHEN 325 MG/1
650 TABLET ORAL EVERY 4 HOURS PRN
Status: DISCONTINUED | OUTPATIENT
Start: 2019-07-31 | End: 2019-08-05 | Stop reason: HOSPADM

## 2019-07-31 RX ORDER — DEXTROSE MONOHYDRATE 50 MG/ML
100 INJECTION, SOLUTION INTRAVENOUS PRN
Status: DISCONTINUED | OUTPATIENT
Start: 2019-07-31 | End: 2019-08-05 | Stop reason: HOSPADM

## 2019-07-31 RX ADMIN — SODIUM CHLORIDE 500 ML: 9 INJECTION, SOLUTION INTRAVENOUS at 15:59

## 2019-07-31 RX ADMIN — ENOXAPARIN SODIUM 30 MG: 30 INJECTION SUBCUTANEOUS at 19:14

## 2019-07-31 RX ADMIN — SODIUM CHLORIDE 1000 ML: 9 INJECTION, SOLUTION INTRAVENOUS at 19:13

## 2019-07-31 RX ADMIN — CEFTRIAXONE SODIUM 1 G: 1 INJECTION, POWDER, FOR SOLUTION INTRAMUSCULAR; INTRAVENOUS at 15:34

## 2019-07-31 RX ADMIN — SOTALOL HYDROCHLORIDE 40 MG: 80 TABLET ORAL at 21:17

## 2019-07-31 RX ADMIN — INSULIN LISPRO 2 UNITS: 100 INJECTION, SOLUTION INTRAVENOUS; SUBCUTANEOUS at 21:34

## 2019-07-31 RX ADMIN — DONEPEZIL HYDROCHLORIDE 5 MG: 5 TABLET, FILM COATED ORAL at 21:17

## 2019-07-31 RX ADMIN — VANCOMYCIN HYDROCHLORIDE 1750 MG: 5 INJECTION, POWDER, LYOPHILIZED, FOR SOLUTION INTRAVENOUS at 19:13

## 2019-07-31 RX ADMIN — ACETAMINOPHEN 650 MG: 325 TABLET ORAL at 19:12

## 2019-07-31 RX ADMIN — MEMANTINE 10 MG: 10 TABLET ORAL at 21:18

## 2019-07-31 RX ADMIN — GABAPENTIN 400 MG: 400 CAPSULE ORAL at 21:17

## 2019-07-31 RX ADMIN — DICYCLOMINE HYDROCHLORIDE 10 MG: 10 CAPSULE ORAL at 19:13

## 2019-07-31 ASSESSMENT — PAIN SCALES - GENERAL
PAINLEVEL_OUTOF10: 0

## 2019-07-31 NOTE — ED NOTES
Stroke alert terminated per [facility] provider.  Pt resting wife at bedside continue to monitor     Jose Luis Wilson RN  07/31/19 0180 Zee Smith RN  07/31/19 1839

## 2019-08-01 ENCOUNTER — APPOINTMENT (OUTPATIENT)
Dept: ULTRASOUND IMAGING | Age: 84
DRG: 871 | End: 2019-08-01
Payer: COMMERCIAL

## 2019-08-01 LAB
ALBUMIN SERPL-MCNC: 3.6 GM/DL (ref 3.4–5)
ALP BLD-CCNC: 65 IU/L (ref 40–128)
ALT SERPL-CCNC: 13 U/L (ref 10–40)
ANION GAP SERPL CALCULATED.3IONS-SCNC: 15 MMOL/L (ref 4–16)
ANISOCYTOSIS: ABNORMAL
AST SERPL-CCNC: 29 IU/L (ref 15–37)
BANDED NEUTROPHILS ABSOLUTE COUNT: 2.76 K/CU MM
BANDED NEUTROPHILS RELATIVE PERCENT: 24 % (ref 5–11)
BILIRUB SERPL-MCNC: 1.9 MG/DL (ref 0–1)
BUN BLDV-MCNC: 12 MG/DL (ref 6–23)
CALCIUM SERPL-MCNC: 8.6 MG/DL (ref 8.3–10.6)
CHLORIDE BLD-SCNC: 101 MMOL/L (ref 99–110)
CO2: 19 MMOL/L (ref 21–32)
CREAT SERPL-MCNC: 1.1 MG/DL (ref 0.9–1.3)
DIFFERENTIAL TYPE: ABNORMAL
EKG ATRIAL RATE: 79 BPM
EKG DIAGNOSIS: NORMAL
EKG Q-T INTERVAL: 366 MS
EKG QRS DURATION: 122 MS
EKG QTC CALCULATION (BAZETT): 483 MS
EKG R AXIS: -72 DEGREES
EKG T AXIS: -9 DEGREES
EKG VENTRICULAR RATE: 105 BPM
GFR AFRICAN AMERICAN: >60 ML/MIN/1.73M2
GFR NON-AFRICAN AMERICAN: >60 ML/MIN/1.73M2
GLUCOSE BLD-MCNC: 153 MG/DL (ref 70–99)
GLUCOSE BLD-MCNC: 159 MG/DL (ref 70–99)
GLUCOSE BLD-MCNC: 193 MG/DL (ref 70–99)
GLUCOSE BLD-MCNC: 207 MG/DL (ref 70–99)
GLUCOSE BLD-MCNC: 223 MG/DL (ref 70–99)
HCT VFR BLD CALC: 38.9 % (ref 42–52)
HEMOGLOBIN: 11.8 GM/DL (ref 13.5–18)
LACTIC ACID, SEPSIS: 2.4 MMOL/L (ref 0.5–1.9)
LYMPHOCYTES ABSOLUTE: 0.9 K/CU MM
LYMPHOCYTES RELATIVE PERCENT: 8 % (ref 24–44)
MACROCYTES: ABNORMAL
MAGNESIUM: 1.7 MG/DL (ref 1.8–2.4)
MCH RBC QN AUTO: 33.5 PG (ref 27–31)
MCHC RBC AUTO-ENTMCNC: 30.3 % (ref 32–36)
MCV RBC AUTO: 110.5 FL (ref 78–100)
MONOCYTES ABSOLUTE: 0.2 K/CU MM
MONOCYTES RELATIVE PERCENT: 2 % (ref 0–4)
OVALOCYTES: ABNORMAL
PDW BLD-RTO: 20.1 % (ref 11.7–14.9)
PHOSPHORUS: 2.8 MG/DL (ref 2.5–4.9)
PLATELET # BLD: 202 K/CU MM (ref 140–440)
PMV BLD AUTO: 10.2 FL (ref 7.5–11.1)
POLYCHROMASIA: ABNORMAL
POTASSIUM SERPL-SCNC: 4.4 MMOL/L (ref 3.5–5.1)
PROCALCITONIN: 0.48
RBC # BLD: 3.52 M/CU MM (ref 4.6–6.2)
SEGMENTED NEUTROPHILS ABSOLUTE COUNT: 7.6 K/CU MM
SEGMENTED NEUTROPHILS RELATIVE PERCENT: 66 % (ref 36–66)
SODIUM BLD-SCNC: 135 MMOL/L (ref 135–145)
TOTAL PROTEIN: 5.8 GM/DL (ref 6.4–8.2)
TOXIC GRANULATION: PRESENT
WBC # BLD: 11.5 K/CU MM (ref 4–10.5)

## 2019-08-01 PROCEDURE — 84145 PROCALCITONIN (PCT): CPT

## 2019-08-01 PROCEDURE — 83735 ASSAY OF MAGNESIUM: CPT

## 2019-08-01 PROCEDURE — 80053 COMPREHEN METABOLIC PANEL: CPT

## 2019-08-01 PROCEDURE — 1200000000 HC SEMI PRIVATE

## 2019-08-01 PROCEDURE — 76775 US EXAM ABDO BACK WALL LIM: CPT

## 2019-08-01 PROCEDURE — 87086 URINE CULTURE/COLONY COUNT: CPT

## 2019-08-01 PROCEDURE — 84100 ASSAY OF PHOSPHORUS: CPT

## 2019-08-01 PROCEDURE — 6360000002 HC RX W HCPCS: Performed by: GENERAL PRACTICE

## 2019-08-01 PROCEDURE — 85027 COMPLETE CBC AUTOMATED: CPT

## 2019-08-01 PROCEDURE — 93010 ELECTROCARDIOGRAM REPORT: CPT | Performed by: INTERNAL MEDICINE

## 2019-08-01 PROCEDURE — 82962 GLUCOSE BLOOD TEST: CPT

## 2019-08-01 PROCEDURE — 2580000003 HC RX 258: Performed by: GENERAL PRACTICE

## 2019-08-01 PROCEDURE — 6370000000 HC RX 637 (ALT 250 FOR IP): Performed by: NURSE PRACTITIONER

## 2019-08-01 PROCEDURE — 36415 COLL VENOUS BLD VENIPUNCTURE: CPT

## 2019-08-01 PROCEDURE — 85007 BL SMEAR W/DIFF WBC COUNT: CPT

## 2019-08-01 PROCEDURE — 83605 ASSAY OF LACTIC ACID: CPT

## 2019-08-01 PROCEDURE — 6370000000 HC RX 637 (ALT 250 FOR IP): Performed by: GENERAL PRACTICE

## 2019-08-01 RX ORDER — MAGNESIUM SULFATE IN WATER 40 MG/ML
2 INJECTION, SOLUTION INTRAVENOUS ONCE
Status: COMPLETED | OUTPATIENT
Start: 2019-08-01 | End: 2019-08-01

## 2019-08-01 RX ORDER — PROMETHAZINE HYDROCHLORIDE 25 MG/ML
6.25 INJECTION, SOLUTION INTRAMUSCULAR; INTRAVENOUS EVERY 6 HOURS PRN
Status: DISCONTINUED | OUTPATIENT
Start: 2019-08-01 | End: 2019-08-05 | Stop reason: HOSPADM

## 2019-08-01 RX ORDER — ACETAMINOPHEN 650 MG/1
650 SUPPOSITORY RECTAL EVERY 4 HOURS PRN
Status: DISCONTINUED | OUTPATIENT
Start: 2019-08-01 | End: 2019-08-05 | Stop reason: HOSPADM

## 2019-08-01 RX ADMIN — ACETAMINOPHEN 650 MG: 650 SUPPOSITORY RECTAL at 12:30

## 2019-08-01 RX ADMIN — PROMETHAZINE HYDROCHLORIDE 6.25 MG: 25 INJECTION INTRAMUSCULAR; INTRAVENOUS at 13:48

## 2019-08-01 RX ADMIN — INSULIN LISPRO 2 UNITS: 100 INJECTION, SOLUTION INTRAVENOUS; SUBCUTANEOUS at 21:30

## 2019-08-01 RX ADMIN — DONEPEZIL HYDROCHLORIDE 5 MG: 5 TABLET, FILM COATED ORAL at 21:29

## 2019-08-01 RX ADMIN — MAGNESIUM SULFATE HEPTAHYDRATE 2 G: 40 INJECTION, SOLUTION INTRAVENOUS at 13:22

## 2019-08-01 RX ADMIN — VANCOMYCIN HYDROCHLORIDE 1250 MG: 5 INJECTION, POWDER, LYOPHILIZED, FOR SOLUTION INTRAVENOUS at 18:25

## 2019-08-01 RX ADMIN — OXYCODONE HYDROCHLORIDE 7.5 MG: 5 TABLET ORAL at 18:08

## 2019-08-01 RX ADMIN — ENOXAPARIN SODIUM 30 MG: 30 INJECTION SUBCUTANEOUS at 08:40

## 2019-08-01 RX ADMIN — GABAPENTIN 400 MG: 400 CAPSULE ORAL at 21:29

## 2019-08-01 RX ADMIN — GABAPENTIN 400 MG: 400 CAPSULE ORAL at 18:08

## 2019-08-01 RX ADMIN — SOTALOL HYDROCHLORIDE 40 MG: 80 TABLET ORAL at 21:28

## 2019-08-01 RX ADMIN — ONDANSETRON 4 MG: 2 INJECTION INTRAMUSCULAR; INTRAVENOUS at 08:40

## 2019-08-01 RX ADMIN — INSULIN LISPRO 4 UNITS: 100 INJECTION, SOLUTION INTRAVENOUS; SUBCUTANEOUS at 17:37

## 2019-08-01 RX ADMIN — AMITRIPTYLINE HYDROCHLORIDE 20 MG: 10 TABLET, FILM COATED ORAL at 21:29

## 2019-08-01 RX ADMIN — MEMANTINE 10 MG: 10 TABLET ORAL at 21:29

## 2019-08-01 RX ADMIN — CEFTRIAXONE 1 G: 1 INJECTION, POWDER, FOR SOLUTION INTRAMUSCULAR; INTRAVENOUS at 15:36

## 2019-08-01 RX ADMIN — SODIUM CHLORIDE: 9 INJECTION, SOLUTION INTRAVENOUS at 18:10

## 2019-08-01 RX ADMIN — Medication 10 ML: at 08:40

## 2019-08-01 RX ADMIN — ACETAMINOPHEN 650 MG: 325 TABLET ORAL at 03:16

## 2019-08-01 RX ADMIN — SODIUM CHLORIDE: 9 INJECTION, SOLUTION INTRAVENOUS at 08:35

## 2019-08-01 ASSESSMENT — PAIN SCALES - GENERAL
PAINLEVEL_OUTOF10: 7
PAINLEVEL_OUTOF10: 0
PAINLEVEL_OUTOF10: 0
PAINLEVEL_OUTOF10: 1
PAINLEVEL_OUTOF10: 0
PAINLEVEL_OUTOF10: 0

## 2019-08-01 NOTE — PLAN OF CARE
Problem: Discharge Planning:  Goal: Discharged to appropriate level of care  Description  Discharged to appropriate level of care  8/1/2019 1503 by Stiven Pinto RN  Outcome: Ongoing  8/1/2019 1054 by Jennifer Franco RN  Outcome: Ongoing     Problem: Infection, Septic Shock:  Goal: Will show no infection signs and symptoms  Description  Will show no infection signs and symptoms  8/1/2019 1503 by Stiven Pinto RN  Outcome: Ongoing  8/1/2019 1054 by Jennifer Franco RN  Outcome: Ongoing     Problem: Serum Glucose Level - Abnormal:  Goal: Ability to maintain appropriate glucose levels will improve  Description  Ability to maintain appropriate glucose levels will improve  8/1/2019 1503 by Stiven Pinto RN  Outcome: Ongoing  8/1/2019 1054 by Jennifer Franco RN  Outcome: Ongoing     Problem: Tissue Perfusion, Altered:  Goal: Circulatory function within specified parameters  Description  Circulatory function within specified parameters  8/1/2019 1503 by Stiven Pinto RN  Outcome: Ongoing  8/1/2019 1054 by Jennifer Franco RN  Outcome: Ongoing     Problem: Venous Thromboembolism:  Goal: Will show no signs or symptoms of venous thromboembolism  Description  Will show no signs or symptoms of venous thromboembolism  8/1/2019 1503 by Stiven Pinto RN  Outcome: Ongoing  8/1/2019 1054 by Jennifer Franco RN  Outcome: Ongoing  Goal: Absence of signs or symptoms of impaired coagulation  Description  Absence of signs or symptoms of impaired coagulation  8/1/2019 1503 by Stiven Pinto RN  Outcome: Ongoing  8/1/2019 1054 by Jennifer Franco RN  Outcome: Ongoing     Problem: Infection:  Goal: Will remain free from infection  Description  Will remain free from infection  8/1/2019 1503 by Stiven Pinto RN  Outcome: Ongoing  8/1/2019 1054 by Jennifer Franco RN  Outcome: Ongoing     Problem: Safety:  Goal: Free from accidental physical injury  Description  Free from accidental physical injury  8/1/2019 1503 by Stiven Pinto RN  Outcome: Ongoing  8/1/2019 1054 by Alondra Salazar RN  Outcome: Ongoing     Problem: Daily Care:  Goal: Daily care needs are met  Description  Daily care needs are met  8/1/2019 1503 by Eva Zhong RN  Outcome: Ongoing  8/1/2019 1054 by Alondra Salazar RN  Outcome: Ongoing     Problem: Pain:  Goal: Patient's pain/discomfort is manageable  Description  Patient's pain/discomfort is manageable  8/1/2019 1503 by Eva Zhong RN  Outcome: Ongoing  8/1/2019 1054 by Alondra Salazar RN  Outcome: Ongoing     Problem: Skin Integrity:  Goal: Skin integrity will stabilize  Description  Skin integrity will stabilize  8/1/2019 1503 by Eva Zhong RN  Outcome: Ongoing  8/1/2019 1054 by Alondra Salazar RN  Outcome: Ongoing     Problem: Discharge Planning:  Goal: Patients continuum of care needs are met  Description  Patients continuum of care needs are met  8/1/2019 1503 by Eva Zhong RN  Outcome: Ongoing  8/1/2019 1054 by Alondra Salazar RN  Outcome: Ongoing     Problem: Risk for Impaired Skin Integrity  Goal: Tissue integrity - skin and mucous membranes  Description  Structural intactness and normal physiological function of skin and  mucous membranes.   Outcome: Ongoing     Problem: Falls - Risk of:  Goal: Will remain free from falls  Description  Will remain free from falls  Outcome: Ongoing  Goal: Absence of physical injury  Description  Absence of physical injury  Outcome: Ongoing

## 2019-08-01 NOTE — CONSULTS
[facility] [address]   Consult Note  [facility] 1 2 3 4 5    Date: 2019   Patient: Quinn Woods   : 1935   DOA: 2019   MRN: [id number]   ROOM#: [id number]     Reason for Consult:  recent cystoscopy. possible UTI  Requesting Physician:  Dr. Celis Pro   Collaborating Urologist on Call at time of admission: Dr Preethi Ruffin:  Febrile     History Obtained From:  patient, family member - daughter, significant other, electronic medical record    HISTORY OF PRESENT ILLNESS:                The patient is a 80 y.o. male with significant past medical history of atrial fibrillation, back pain, coronary artery disease, dementia, hypertension, hyperlipidemia, arthritis. who presented with fever and chills     Past Medical History:        Diagnosis Date    Anesthesia complication     becomes combative and confused s/p anesthesia. Needed a strait jacket s/p back surgery.     Arthritis     hands, back    Atrial fibrillation (HCC)     Back pain     Blood transfusion     CAD (coronary artery disease)     Cancer (HCC)     Top of head-frozen off    Dementia     Histoplasmosis     x2 last time in     History of blood transfusion     Hyperlipidemia     Hypertension     Nerve damage     bilat feet s/p back surgery     Past Surgical History:        Procedure Laterality Date    APPENDECTOMY      BACK SURGERY  Last done 2007    x 2    CHOLECYSTECTOMY  12/15/14    laparoscopic    COLONOSCOPY      MITRAL VALVE SURGERY      OTHER SURGICAL HISTORY  2013    lap appy    SKIN GRAFT  1950's    s/p [place]    TONSILLECTOMY  15years old     Current Medications:   Current Facility-Administered Medications: amitriptyline (ELAVIL) tablet 20 mg, 20 mg, Oral, Nightly  aspirin chewable tablet 81 mg, 81 mg, Oral, Daily  dicyclomine (BENTYL) capsule 10 mg, 10 mg, Oral, TID AC  donepezil (ARICEPT) tablet 5 mg, 5 mg, Oral, Nightly  therapeutic multivitamin-minerals 1 tablet, 1 tablet, Oral, Daily  potassium chloride (KLOR-CON M) extended release tablet 10 mEq, 10 mEq, Oral, QAM  sotalol (BETAPACE) tablet 40 mg, 40 mg, Oral, BID  tamsulosin (FLOMAX) capsule 0.4 mg, 0.4 mg, Oral, Daily  traMADol (ULTRAM) tablet 50 mg, 50 mg, Oral, Q6H PRN  sodium chloride flush 0.9 % injection 10 mL, 10 mL, Intravenous, 2 times per day  sodium chloride flush 0.9 % injection 10 mL, 10 mL, Intravenous, PRN  magnesium hydroxide (MILK OF MAGNESIA) 400 MG/5ML suspension 30 mL, 30 mL, Oral, Daily PRN  ondansetron (ZOFRAN) injection 4 mg, 4 mg, Intravenous, Q6H PRN  enoxaparin (LOVENOX) injection 30 mg, 30 mg, Subcutaneous, Daily  cefTRIAXone (ROCEPHIN) 1 g in dextrose 5 % 50 mL IVPB, 1 g, Intravenous, Q24H  vancomycin (VANCOCIN) 1,250 mg in dextrose 5 % 250 mL IVPB, 1,250 mg, Intravenous, Q24H  DULoxetine (CYMBALTA) extended release capsule 30 mg, 30 mg, Oral, Daily  memantine (NAMENDA) tablet 10 mg, 10 mg, Oral, BID  oxyCODONE (ROXICODONE) immediate release tablet 7.5 mg, 7.5 mg, Oral, TID PRN  glucose (GLUTOSE) 40 % oral gel 15 g, 15 g, Oral, PRN  dextrose 50 % IV solution, 12.5 g, Intravenous, PRN  glucagon (rDNA) injection 1 mg, 1 mg, Intramuscular, PRN  dextrose 5 % solution, 100 mL/hr, Intravenous, PRN  insulin lispro (HUMALOG) injection vial 0-12 Units, 0-12 Units, Subcutaneous, TID WC  insulin lispro (HUMALOG) injection vial 0-6 Units, 0-6 Units, Subcutaneous, Nightly  gabapentin (NEURONTIN) capsule 400 mg, 400 mg, Oral, TID  0.9 % sodium chloride infusion, , Intravenous, Continuous  acetaminophen (TYLENOL) tablet 650 mg, 650 mg, Oral, Q4H PRN    Allergies:  Demerol; Erythromycin; and Zithromax [azithromycin dihydrate]    Social History:   TOBACCO:   reports that he has never smoked. He has never used smokeless tobacco.  ETOH:   reports that he does not drink alcohol. DRUGS:   reports that he does not use drugs.     Family History:       Problem Relation Age of Onset    Heart Disease Mother

## 2019-08-01 NOTE — ED PROVIDER NOTES
clubs or organizations: None     Relationship status: None    Intimate partner violence:     Fear of current or ex partner: None     Emotionally abused: None     Physically abused: None     Forced sexual activity: None   Other Topics Concern    None   Social History Narrative    Do you donate blood or plasma? Caffeine intake? Advance directive? Is blood transfusion acceptable in an emergency? Family History   Problem Relation Age of Onset    Heart Disease Mother         CHF    Other Father         poss anuerysm, martinettia    Other Sister         staph infection s/p back surgery    Heart Disease Sister         CHF    Cancer Brother         lower spine, bladder    Heart Disease Brother     Other Daughter         HX brain hemmorage         PHYSICAL EXAM    VITAL SIGNS: BP (!) 149/57   Pulse 102   Temp 100.9 °F (38.3 °C) (Oral)   Resp 22   Ht 5' 10.5\" (1.791 m)   Wt 193 lb (87.5 kg)   SpO2 94%   BMI 27.30 kg/m²    Constitutional:  Well developed, Well nourished  HENT:  Normocephalic, Atraumatic, PERRL. EOMI. Sclera clear. Conjunctiva normal, No discharge. Neck/Lymphatics: supple, no JVD, no swollen nodes  Cardiovascular:  Rate tachycardic around 105 bpm, irregular rhythm,  no murmurs/rubs/gallops. No carotid bruits or murmurs heard in carotids. No JVD  Respiratory:  Nonlabored breathing. Normal breath sounds, No wheezing  Abdomen: Bowel sounds normal, Soft, No tenderness, no masses. Musculoskeletal:    There is 1+ pitting edema of the bilateral lower extremities without asymmetry, or calf / thigh tenderness bilaterally. No cyanosis. No cool or pale-appearing limb. Distal cap refill and pulses intact bilateral upper and lower extremities  Bilateral upper and lower extremity ROM intact without pain or obvious deficit  Integument:  Warm, Dry    Neurologic:    - Alert & oriented person, place, no speech difficulties or slurring.   Patient is not alert to situation or time.  - No

## 2019-08-02 ENCOUNTER — APPOINTMENT (OUTPATIENT)
Dept: GENERAL RADIOLOGY | Age: 84
DRG: 871 | End: 2019-08-02
Payer: COMMERCIAL

## 2019-08-02 LAB
CREAT SERPL-MCNC: 1.1 MG/DL (ref 0.9–1.3)
GFR AFRICAN AMERICAN: >60 ML/MIN/1.73M2
GFR NON-AFRICAN AMERICAN: >60 ML/MIN/1.73M2
GLUCOSE BLD-MCNC: 126 MG/DL (ref 70–99)
GLUCOSE BLD-MCNC: 174 MG/DL (ref 70–99)
GLUCOSE BLD-MCNC: 192 MG/DL (ref 70–99)

## 2019-08-02 PROCEDURE — 97162 PT EVAL MOD COMPLEX 30 MIN: CPT

## 2019-08-02 PROCEDURE — 6370000000 HC RX 637 (ALT 250 FOR IP): Performed by: GENERAL PRACTICE

## 2019-08-02 PROCEDURE — 94761 N-INVAS EAR/PLS OXIMETRY MLT: CPT

## 2019-08-02 PROCEDURE — 97116 GAIT TRAINING THERAPY: CPT

## 2019-08-02 PROCEDURE — 97166 OT EVAL MOD COMPLEX 45 MIN: CPT

## 2019-08-02 PROCEDURE — 97535 SELF CARE MNGMENT TRAINING: CPT

## 2019-08-02 PROCEDURE — 6360000002 HC RX W HCPCS: Performed by: GENERAL PRACTICE

## 2019-08-02 PROCEDURE — 82565 ASSAY OF CREATININE: CPT

## 2019-08-02 PROCEDURE — 2580000003 HC RX 258: Performed by: GENERAL PRACTICE

## 2019-08-02 PROCEDURE — 82962 GLUCOSE BLOOD TEST: CPT

## 2019-08-02 PROCEDURE — 2580000003 HC RX 258

## 2019-08-02 PROCEDURE — 1200000000 HC SEMI PRIVATE

## 2019-08-02 PROCEDURE — 97530 THERAPEUTIC ACTIVITIES: CPT

## 2019-08-02 PROCEDURE — 36415 COLL VENOUS BLD VENIPUNCTURE: CPT

## 2019-08-02 PROCEDURE — 71046 X-RAY EXAM CHEST 2 VIEWS: CPT

## 2019-08-02 RX ORDER — APIXABAN 5 MG/1
2.5 TABLET, FILM COATED ORAL 2 TIMES DAILY
Status: ON HOLD | COMMUNITY
Start: 2019-05-17 | End: 2020-07-10 | Stop reason: HOSPADM

## 2019-08-02 RX ORDER — POTASSIUM CHLORIDE 750 MG/1
TABLET, FILM COATED, EXTENDED RELEASE ORAL
Refills: 1 | COMMUNITY
Start: 2019-06-11 | End: 2019-11-18 | Stop reason: SDUPTHER

## 2019-08-02 RX ORDER — TERAZOSIN 2 MG/1
2 CAPSULE ORAL
COMMUNITY
End: 2019-11-18

## 2019-08-02 RX ORDER — SODIUM CHLORIDE 9 MG/ML
INJECTION, SOLUTION INTRAVENOUS
Status: COMPLETED
Start: 2019-08-02 | End: 2019-08-02

## 2019-08-02 RX ORDER — OXYCODONE HYDROCHLORIDE AND ACETAMINOPHEN 5; 325 MG/1; MG/1
1 TABLET ORAL
Status: ON HOLD | COMMUNITY
End: 2019-08-05 | Stop reason: HOSPADM

## 2019-08-02 RX ADMIN — DONEPEZIL HYDROCHLORIDE 5 MG: 5 TABLET, FILM COATED ORAL at 21:29

## 2019-08-02 RX ADMIN — MEMANTINE 10 MG: 10 TABLET ORAL at 21:29

## 2019-08-02 RX ADMIN — ASPIRIN 81 MG 81 MG: 81 TABLET ORAL at 09:44

## 2019-08-02 RX ADMIN — ENOXAPARIN SODIUM 30 MG: 30 INJECTION SUBCUTANEOUS at 09:44

## 2019-08-02 RX ADMIN — INSULIN LISPRO 2 UNITS: 100 INJECTION, SOLUTION INTRAVENOUS; SUBCUTANEOUS at 12:18

## 2019-08-02 RX ADMIN — MEMANTINE 10 MG: 10 TABLET ORAL at 09:44

## 2019-08-02 RX ADMIN — DULOXETINE HYDROCHLORIDE 30 MG: 30 CAPSULE, DELAYED RELEASE ORAL at 09:43

## 2019-08-02 RX ADMIN — Medication 10 ML: at 12:00

## 2019-08-02 RX ADMIN — SOTALOL HYDROCHLORIDE 40 MG: 80 TABLET ORAL at 09:44

## 2019-08-02 RX ADMIN — DICYCLOMINE HYDROCHLORIDE 10 MG: 10 CAPSULE ORAL at 06:27

## 2019-08-02 RX ADMIN — GABAPENTIN 400 MG: 400 CAPSULE ORAL at 14:59

## 2019-08-02 RX ADMIN — SODIUM CHLORIDE 250 ML: 9 INJECTION, SOLUTION INTRAVENOUS at 16:20

## 2019-08-02 RX ADMIN — DICYCLOMINE HYDROCHLORIDE 10 MG: 10 CAPSULE ORAL at 09:43

## 2019-08-02 RX ADMIN — SOTALOL HYDROCHLORIDE 40 MG: 80 TABLET ORAL at 21:29

## 2019-08-02 RX ADMIN — VANCOMYCIN HYDROCHLORIDE 1250 MG: 5 INJECTION, POWDER, LYOPHILIZED, FOR SOLUTION INTRAVENOUS at 17:59

## 2019-08-02 RX ADMIN — TAMSULOSIN HYDROCHLORIDE 0.4 MG: 0.4 CAPSULE ORAL at 09:44

## 2019-08-02 RX ADMIN — GABAPENTIN 400 MG: 400 CAPSULE ORAL at 21:29

## 2019-08-02 RX ADMIN — Medication 10 ML: at 21:32

## 2019-08-02 RX ADMIN — GABAPENTIN 400 MG: 400 CAPSULE ORAL at 09:44

## 2019-08-02 RX ADMIN — INSULIN LISPRO 1 UNITS: 100 INJECTION, SOLUTION INTRAVENOUS; SUBCUTANEOUS at 21:31

## 2019-08-02 RX ADMIN — MULTIPLE VITAMINS W/ MINERALS TAB 1 TABLET: TAB at 09:44

## 2019-08-02 RX ADMIN — DICYCLOMINE HYDROCHLORIDE 10 MG: 10 CAPSULE ORAL at 16:20

## 2019-08-02 RX ADMIN — CEFTRIAXONE 1 G: 1 INJECTION, POWDER, FOR SOLUTION INTRAMUSCULAR; INTRAVENOUS at 16:19

## 2019-08-02 RX ADMIN — POTASSIUM CHLORIDE 10 MEQ: 20 TABLET, EXTENDED RELEASE ORAL at 09:44

## 2019-08-02 RX ADMIN — AMITRIPTYLINE HYDROCHLORIDE 20 MG: 10 TABLET, FILM COATED ORAL at 21:28

## 2019-08-02 ASSESSMENT — PAIN SCALES - GENERAL: PAINLEVEL_OUTOF10: 0

## 2019-08-02 NOTE — PLAN OF CARE
Problem: Infection, Septic Shock:  Goal: Will show no infection signs and symptoms  Description  Will show no infection signs and symptoms  Outcome: Ongoing     Problem: Tissue Perfusion, Altered:  Goal: Circulatory function within specified parameters  Description  Circulatory function within specified parameters  Outcome: Ongoing     Problem: Pain:  Goal: Patient's pain/discomfort is manageable  Description  Patient's pain/discomfort is manageable  Outcome: Ongoing     Problem: Safety:  Goal: Free from accidental physical injury  Description  Free from accidental physical injury  Outcome: Ongoing

## 2019-08-03 LAB
ALBUMIN SERPL-MCNC: 3.2 GM/DL (ref 3.4–5)
ALP BLD-CCNC: 48 IU/L (ref 40–129)
ALT SERPL-CCNC: 12 U/L (ref 10–40)
ANION GAP SERPL CALCULATED.3IONS-SCNC: 10 MMOL/L (ref 4–16)
AST SERPL-CCNC: 21 IU/L (ref 15–37)
BASOPHILS ABSOLUTE: 0 K/CU MM
BASOPHILS RELATIVE PERCENT: 0.1 % (ref 0–1)
BILIRUB SERPL-MCNC: 0.8 MG/DL (ref 0–1)
BUN BLDV-MCNC: 16 MG/DL (ref 6–23)
CALCIUM SERPL-MCNC: 8.2 MG/DL (ref 8.3–10.6)
CHLORIDE BLD-SCNC: 103 MMOL/L (ref 99–110)
CO2: 22 MMOL/L (ref 21–32)
CREAT SERPL-MCNC: 1 MG/DL (ref 0.9–1.3)
CULTURE: NORMAL
DIFFERENTIAL TYPE: ABNORMAL
DOSE AMOUNT: NORMAL
DOSE TIME: NORMAL
EOSINOPHILS ABSOLUTE: 0.1 K/CU MM
EOSINOPHILS RELATIVE PERCENT: 1.1 % (ref 0–3)
GFR AFRICAN AMERICAN: >60 ML/MIN/1.73M2
GFR NON-AFRICAN AMERICAN: >60 ML/MIN/1.73M2
GLUCOSE BLD-MCNC: 137 MG/DL (ref 70–99)
GLUCOSE BLD-MCNC: 157 MG/DL (ref 70–99)
GLUCOSE BLD-MCNC: 170 MG/DL (ref 70–99)
GLUCOSE BLD-MCNC: 192 MG/DL (ref 70–99)
GLUCOSE BLD-MCNC: 93 MG/DL (ref 70–99)
HCT VFR BLD CALC: 32.2 % (ref 42–52)
HEMOGLOBIN: 10.2 GM/DL (ref 13.5–18)
IMMATURE NEUTROPHIL %: 0.9 % (ref 0–0.43)
LYMPHOCYTES ABSOLUTE: 1.9 K/CU MM
LYMPHOCYTES RELATIVE PERCENT: 18.4 % (ref 24–44)
Lab: NORMAL
MCH RBC QN AUTO: 32.9 PG (ref 27–31)
MCHC RBC AUTO-ENTMCNC: 31.7 % (ref 32–36)
MCV RBC AUTO: 103.9 FL (ref 78–100)
MONOCYTES ABSOLUTE: 0.5 K/CU MM
MONOCYTES RELATIVE PERCENT: 5.2 % (ref 0–4)
NUCLEATED RBC %: 0.6 %
PDW BLD-RTO: 19.8 % (ref 11.7–14.9)
PLATELET # BLD: 143 K/CU MM (ref 140–440)
PMV BLD AUTO: 9.8 FL (ref 7.5–11.1)
POTASSIUM SERPL-SCNC: 4.3 MMOL/L (ref 3.5–5.1)
PROCALCITONIN: 0.36
RBC # BLD: 3.1 M/CU MM (ref 4.6–6.2)
SEGMENTED NEUTROPHILS ABSOLUTE COUNT: 7.5 K/CU MM
SEGMENTED NEUTROPHILS RELATIVE PERCENT: 74.3 % (ref 36–66)
SODIUM BLD-SCNC: 135 MMOL/L (ref 135–145)
SPECIMEN: NORMAL
TOTAL IMMATURE NEUTOROPHIL: 0.09 K/CU MM
TOTAL NUCLEATED RBC: 0.1 K/CU MM
TOTAL PROTEIN: 5.3 GM/DL (ref 6.4–8.2)
VANCOMYCIN TROUGH: 16.6 UG/ML (ref 10–20)
WBC # BLD: 10.2 K/CU MM (ref 4–10.5)

## 2019-08-03 PROCEDURE — 2580000003 HC RX 258: Performed by: GENERAL PRACTICE

## 2019-08-03 PROCEDURE — 6360000002 HC RX W HCPCS: Performed by: GENERAL PRACTICE

## 2019-08-03 PROCEDURE — 82962 GLUCOSE BLOOD TEST: CPT

## 2019-08-03 PROCEDURE — 84145 PROCALCITONIN (PCT): CPT

## 2019-08-03 PROCEDURE — 80053 COMPREHEN METABOLIC PANEL: CPT

## 2019-08-03 PROCEDURE — 36415 COLL VENOUS BLD VENIPUNCTURE: CPT

## 2019-08-03 PROCEDURE — 85025 COMPLETE CBC W/AUTO DIFF WBC: CPT

## 2019-08-03 PROCEDURE — 6370000000 HC RX 637 (ALT 250 FOR IP): Performed by: GENERAL PRACTICE

## 2019-08-03 PROCEDURE — 94761 N-INVAS EAR/PLS OXIMETRY MLT: CPT

## 2019-08-03 PROCEDURE — 80202 ASSAY OF VANCOMYCIN: CPT

## 2019-08-03 PROCEDURE — 1200000000 HC SEMI PRIVATE

## 2019-08-03 RX ORDER — GABAPENTIN 400 MG/1
800 CAPSULE ORAL EVERY MORNING
Status: DISCONTINUED | OUTPATIENT
Start: 2019-08-04 | End: 2019-08-05 | Stop reason: HOSPADM

## 2019-08-03 RX ORDER — GABAPENTIN 400 MG/1
400 CAPSULE ORAL
Status: DISCONTINUED | OUTPATIENT
Start: 2019-08-03 | End: 2019-08-05 | Stop reason: HOSPADM

## 2019-08-03 RX ORDER — GABAPENTIN 400 MG/1
400 CAPSULE ORAL
Status: DISCONTINUED | OUTPATIENT
Start: 2019-08-04 | End: 2019-08-03

## 2019-08-03 RX ORDER — TRAZODONE HYDROCHLORIDE 50 MG/1
50 TABLET ORAL NIGHTLY PRN
Status: DISCONTINUED | OUTPATIENT
Start: 2019-08-03 | End: 2019-08-05 | Stop reason: HOSPADM

## 2019-08-03 RX ORDER — GABAPENTIN 400 MG/1
800 CAPSULE ORAL NIGHTLY
Status: DISCONTINUED | OUTPATIENT
Start: 2019-08-03 | End: 2019-08-05 | Stop reason: HOSPADM

## 2019-08-03 RX ADMIN — CEFTRIAXONE 1 G: 1 INJECTION, POWDER, FOR SOLUTION INTRAMUSCULAR; INTRAVENOUS at 14:58

## 2019-08-03 RX ADMIN — DICYCLOMINE HYDROCHLORIDE 10 MG: 10 CAPSULE ORAL at 06:56

## 2019-08-03 RX ADMIN — MEMANTINE 10 MG: 10 TABLET ORAL at 08:56

## 2019-08-03 RX ADMIN — APIXABAN 5 MG: 5 TABLET, FILM COATED ORAL at 21:52

## 2019-08-03 RX ADMIN — ENOXAPARIN SODIUM 30 MG: 30 INJECTION SUBCUTANEOUS at 08:57

## 2019-08-03 RX ADMIN — SOTALOL HYDROCHLORIDE 40 MG: 80 TABLET ORAL at 08:57

## 2019-08-03 RX ADMIN — MULTIPLE VITAMINS W/ MINERALS TAB 1 TABLET: TAB at 08:57

## 2019-08-03 RX ADMIN — INSULIN LISPRO 1 UNITS: 100 INJECTION, SOLUTION INTRAVENOUS; SUBCUTANEOUS at 22:02

## 2019-08-03 RX ADMIN — VANCOMYCIN HYDROCHLORIDE 1250 MG: 5 INJECTION, POWDER, LYOPHILIZED, FOR SOLUTION INTRAVENOUS at 18:33

## 2019-08-03 RX ADMIN — DICYCLOMINE HYDROCHLORIDE 10 MG: 10 CAPSULE ORAL at 12:50

## 2019-08-03 RX ADMIN — TAMSULOSIN HYDROCHLORIDE 0.4 MG: 0.4 CAPSULE ORAL at 08:57

## 2019-08-03 RX ADMIN — GABAPENTIN 400 MG: 400 CAPSULE ORAL at 14:58

## 2019-08-03 RX ADMIN — MEMANTINE 10 MG: 10 TABLET ORAL at 21:52

## 2019-08-03 RX ADMIN — OXYCODONE HYDROCHLORIDE 7.5 MG: 5 TABLET ORAL at 12:58

## 2019-08-03 RX ADMIN — SOTALOL HYDROCHLORIDE 40 MG: 80 TABLET ORAL at 21:52

## 2019-08-03 RX ADMIN — POTASSIUM CHLORIDE 10 MEQ: 20 TABLET, EXTENDED RELEASE ORAL at 08:56

## 2019-08-03 RX ADMIN — TRAZODONE HYDROCHLORIDE 50 MG: 50 TABLET ORAL at 23:14

## 2019-08-03 RX ADMIN — DULOXETINE HYDROCHLORIDE 30 MG: 30 CAPSULE, DELAYED RELEASE ORAL at 08:57

## 2019-08-03 RX ADMIN — ASPIRIN 81 MG 81 MG: 81 TABLET ORAL at 08:57

## 2019-08-03 RX ADMIN — DONEPEZIL HYDROCHLORIDE 5 MG: 5 TABLET, FILM COATED ORAL at 21:52

## 2019-08-03 RX ADMIN — DICYCLOMINE HYDROCHLORIDE 10 MG: 10 CAPSULE ORAL at 17:16

## 2019-08-03 RX ADMIN — GABAPENTIN 800 MG: 400 CAPSULE ORAL at 21:51

## 2019-08-03 RX ADMIN — Medication 10 ML: at 21:53

## 2019-08-03 RX ADMIN — INSULIN LISPRO 2 UNITS: 100 INJECTION, SOLUTION INTRAVENOUS; SUBCUTANEOUS at 12:50

## 2019-08-03 RX ADMIN — Medication 10 ML: at 08:57

## 2019-08-03 RX ADMIN — INSULIN LISPRO 2 UNITS: 100 INJECTION, SOLUTION INTRAVENOUS; SUBCUTANEOUS at 08:52

## 2019-08-03 RX ADMIN — GABAPENTIN 400 MG: 400 CAPSULE ORAL at 08:57

## 2019-08-03 RX ADMIN — AMITRIPTYLINE HYDROCHLORIDE 20 MG: 10 TABLET, FILM COATED ORAL at 21:52

## 2019-08-03 ASSESSMENT — PAIN SCALES - GENERAL
PAINLEVEL_OUTOF10: 0
PAINLEVEL_OUTOF10: 3
PAINLEVEL_OUTOF10: 8

## 2019-08-04 LAB
CREAT SERPL-MCNC: 1 MG/DL (ref 0.9–1.3)
GFR AFRICAN AMERICAN: >60 ML/MIN/1.73M2
GFR NON-AFRICAN AMERICAN: >60 ML/MIN/1.73M2
GLUCOSE BLD-MCNC: 121 MG/DL (ref 70–99)
GLUCOSE BLD-MCNC: 123 MG/DL (ref 70–99)
GLUCOSE BLD-MCNC: 144 MG/DL (ref 70–99)
GLUCOSE BLD-MCNC: 238 MG/DL (ref 70–99)

## 2019-08-04 PROCEDURE — 82962 GLUCOSE BLOOD TEST: CPT

## 2019-08-04 PROCEDURE — 1200000000 HC SEMI PRIVATE

## 2019-08-04 PROCEDURE — 6360000002 HC RX W HCPCS: Performed by: GENERAL PRACTICE

## 2019-08-04 PROCEDURE — 82565 ASSAY OF CREATININE: CPT

## 2019-08-04 PROCEDURE — 2580000003 HC RX 258: Performed by: GENERAL PRACTICE

## 2019-08-04 PROCEDURE — 6370000000 HC RX 637 (ALT 250 FOR IP): Performed by: GENERAL PRACTICE

## 2019-08-04 PROCEDURE — 94761 N-INVAS EAR/PLS OXIMETRY MLT: CPT

## 2019-08-04 PROCEDURE — 36415 COLL VENOUS BLD VENIPUNCTURE: CPT

## 2019-08-04 RX ORDER — ACETAMINOPHEN 80 MG
TABLET,CHEWABLE ORAL
Status: COMPLETED
Start: 2019-08-04 | End: 2019-08-05

## 2019-08-04 RX ADMIN — AMITRIPTYLINE HYDROCHLORIDE 20 MG: 10 TABLET, FILM COATED ORAL at 21:22

## 2019-08-04 RX ADMIN — VANCOMYCIN HYDROCHLORIDE 1250 MG: 5 INJECTION, POWDER, LYOPHILIZED, FOR SOLUTION INTRAVENOUS at 17:36

## 2019-08-04 RX ADMIN — APIXABAN 5 MG: 5 TABLET, FILM COATED ORAL at 21:22

## 2019-08-04 RX ADMIN — Medication 10 ML: at 21:22

## 2019-08-04 RX ADMIN — MEMANTINE 10 MG: 10 TABLET ORAL at 21:22

## 2019-08-04 RX ADMIN — SOTALOL HYDROCHLORIDE 40 MG: 80 TABLET ORAL at 21:22

## 2019-08-04 RX ADMIN — DICYCLOMINE HYDROCHLORIDE 10 MG: 10 CAPSULE ORAL at 12:21

## 2019-08-04 RX ADMIN — GABAPENTIN 800 MG: 400 CAPSULE ORAL at 09:42

## 2019-08-04 RX ADMIN — GABAPENTIN 800 MG: 400 CAPSULE ORAL at 21:22

## 2019-08-04 RX ADMIN — GABAPENTIN 400 MG: 400 CAPSULE ORAL at 13:00

## 2019-08-04 RX ADMIN — TAMSULOSIN HYDROCHLORIDE 0.4 MG: 0.4 CAPSULE ORAL at 09:42

## 2019-08-04 RX ADMIN — MEMANTINE 10 MG: 10 TABLET ORAL at 09:42

## 2019-08-04 RX ADMIN — SOTALOL HYDROCHLORIDE 40 MG: 80 TABLET ORAL at 09:42

## 2019-08-04 RX ADMIN — DULOXETINE HYDROCHLORIDE 30 MG: 30 CAPSULE, DELAYED RELEASE ORAL at 09:43

## 2019-08-04 RX ADMIN — CEFTRIAXONE 1 G: 1 INJECTION, POWDER, FOR SOLUTION INTRAMUSCULAR; INTRAVENOUS at 16:08

## 2019-08-04 RX ADMIN — DICYCLOMINE HYDROCHLORIDE 10 MG: 10 CAPSULE ORAL at 06:56

## 2019-08-04 RX ADMIN — ASPIRIN 81 MG 81 MG: 81 TABLET ORAL at 09:43

## 2019-08-04 RX ADMIN — INSULIN LISPRO 4 UNITS: 100 INJECTION, SOLUTION INTRAVENOUS; SUBCUTANEOUS at 12:21

## 2019-08-04 RX ADMIN — Medication 10 ML: at 12:22

## 2019-08-04 RX ADMIN — OXYCODONE HYDROCHLORIDE 7.5 MG: 5 TABLET ORAL at 21:55

## 2019-08-04 RX ADMIN — DICYCLOMINE HYDROCHLORIDE 10 MG: 10 CAPSULE ORAL at 16:08

## 2019-08-04 RX ADMIN — MULTIPLE VITAMINS W/ MINERALS TAB 1 TABLET: TAB at 09:43

## 2019-08-04 RX ADMIN — APIXABAN 5 MG: 5 TABLET, FILM COATED ORAL at 09:42

## 2019-08-04 RX ADMIN — POTASSIUM CHLORIDE 10 MEQ: 20 TABLET, EXTENDED RELEASE ORAL at 09:43

## 2019-08-04 RX ADMIN — DONEPEZIL HYDROCHLORIDE 5 MG: 5 TABLET, FILM COATED ORAL at 21:22

## 2019-08-04 ASSESSMENT — PAIN DESCRIPTION - LOCATION: LOCATION: GENERALIZED

## 2019-08-04 ASSESSMENT — PAIN SCALES - GENERAL
PAINLEVEL_OUTOF10: 0
PAINLEVEL_OUTOF10: 6
PAINLEVEL_OUTOF10: 0

## 2019-08-04 ASSESSMENT — PAIN DESCRIPTION - PAIN TYPE: TYPE: CHRONIC PAIN

## 2019-08-04 NOTE — PLAN OF CARE
Problem: Discharge Planning:  Goal: Discharged to appropriate level of care  Description  Discharged to appropriate level of care  8/4/2019 1031 by Napoleon Noel RN  Outcome: Ongoing  8/4/2019 0043 by Aydee Elliott RN  Outcome: Ongoing     Problem: Infection, Septic Shock:  Goal: Will show no infection signs and symptoms  Description  Will show no infection signs and symptoms  8/4/2019 1031 by Napoleon Noel RN  Outcome: Ongoing  8/4/2019 0043 by Aydee Elliott RN  Outcome: Ongoing     Problem: Serum Glucose Level - Abnormal:  Goal: Ability to maintain appropriate glucose levels will improve  Description  Ability to maintain appropriate glucose levels will improve  8/4/2019 1031 by Napoleon Noel RN  Outcome: Ongoing  8/4/2019 0043 by Aydee Elliott RN  Outcome: Ongoing     Problem: Tissue Perfusion, Altered:  Goal: Circulatory function within specified parameters  Description  Circulatory function within specified parameters  8/4/2019 1031 by Napoleon Noel RN  Outcome: Ongoing  8/4/2019 0043 by Aydee Elliott RN  Outcome: Ongoing     Problem: Venous Thromboembolism:  Goal: Will show no signs or symptoms of venous thromboembolism  Description  Will show no signs or symptoms of venous thromboembolism  8/4/2019 1031 by Napoleon Noel RN  Outcome: Ongoing  8/4/2019 0043 by Aydee Elliott RN  Outcome: Ongoing  Goal: Absence of signs or symptoms of impaired coagulation  Description  Absence of signs or symptoms of impaired coagulation  8/4/2019 1031 by Napoleon Noel RN  Outcome: Ongoing  8/4/2019 0043 by Aydee Elliott RN  Outcome: Ongoing     Problem: Infection:  Goal: Will remain free from infection  Description  Will remain free from infection  8/4/2019 1031 by Napoleon Noel RN  Outcome: Ongoing  8/4/2019 0043 by Aydee Elliott RN  Outcome: Ongoing     Problem: Safety:  Goal: Free from accidental physical injury  Description  Free from accidental physical injury  8/4/2019 1031 by Napoleon Noel

## 2019-08-05 VITALS
OXYGEN SATURATION: 96 % | TEMPERATURE: 98.5 F | DIASTOLIC BLOOD PRESSURE: 58 MMHG | RESPIRATION RATE: 21 BRPM | HEIGHT: 71 IN | HEART RATE: 75 BPM | SYSTOLIC BLOOD PRESSURE: 117 MMHG | WEIGHT: 194.8 LBS | BODY MASS INDEX: 27.27 KG/M2

## 2019-08-05 LAB
ALBUMIN SERPL-MCNC: 3.3 GM/DL (ref 3.4–5)
ALP BLD-CCNC: 58 IU/L (ref 40–128)
ALT SERPL-CCNC: 26 U/L (ref 10–40)
ANION GAP SERPL CALCULATED.3IONS-SCNC: 10 MMOL/L (ref 4–16)
AST SERPL-CCNC: 28 IU/L (ref 15–37)
BASOPHILS ABSOLUTE: 0.1 K/CU MM
BASOPHILS RELATIVE PERCENT: 0.9 % (ref 0–1)
BILIRUB SERPL-MCNC: 0.8 MG/DL (ref 0–1)
BUN BLDV-MCNC: 15 MG/DL (ref 6–23)
CALCIUM SERPL-MCNC: 8.7 MG/DL (ref 8.3–10.6)
CHLORIDE BLD-SCNC: 106 MMOL/L (ref 99–110)
CO2: 22 MMOL/L (ref 21–32)
CREAT SERPL-MCNC: 1 MG/DL (ref 0.9–1.3)
CULTURE: NORMAL
CULTURE: NORMAL
DIFFERENTIAL TYPE: ABNORMAL
EOSINOPHILS ABSOLUTE: 0.3 K/CU MM
EOSINOPHILS RELATIVE PERCENT: 4.6 % (ref 0–3)
GFR AFRICAN AMERICAN: >60 ML/MIN/1.73M2
GFR NON-AFRICAN AMERICAN: >60 ML/MIN/1.73M2
GLUCOSE BLD-MCNC: 116 MG/DL (ref 70–99)
GLUCOSE BLD-MCNC: 133 MG/DL (ref 70–99)
HCT VFR BLD CALC: 35 % (ref 42–52)
HEMOGLOBIN: 10.7 GM/DL (ref 13.5–18)
IMMATURE NEUTROPHIL %: 1.8 % (ref 0–0.43)
LYMPHOCYTES ABSOLUTE: 2.5 K/CU MM
LYMPHOCYTES RELATIVE PERCENT: 37.4 % (ref 24–44)
Lab: NORMAL
Lab: NORMAL
MCH RBC QN AUTO: 33.3 PG (ref 27–31)
MCHC RBC AUTO-ENTMCNC: 30.6 % (ref 32–36)
MCV RBC AUTO: 109 FL (ref 78–100)
MONOCYTES ABSOLUTE: 0.4 K/CU MM
MONOCYTES RELATIVE PERCENT: 6.2 % (ref 0–4)
NUCLEATED RBC %: 0.3 %
PDW BLD-RTO: 19.9 % (ref 11.7–14.9)
PLATELET # BLD: 180 K/CU MM (ref 140–440)
PMV BLD AUTO: 9.8 FL (ref 7.5–11.1)
POTASSIUM SERPL-SCNC: 4.6 MMOL/L (ref 3.5–5.1)
PROCALCITONIN: 0.18
RBC # BLD: 3.21 M/CU MM (ref 4.6–6.2)
SEGMENTED NEUTROPHILS ABSOLUTE COUNT: 3.3 K/CU MM
SEGMENTED NEUTROPHILS RELATIVE PERCENT: 49.1 % (ref 36–66)
SODIUM BLD-SCNC: 138 MMOL/L (ref 135–145)
SPECIMEN: NORMAL
SPECIMEN: NORMAL
TOTAL IMMATURE NEUTOROPHIL: 0.12 K/CU MM
TOTAL NUCLEATED RBC: 0 K/CU MM
TOTAL PROTEIN: 5.4 GM/DL (ref 6.4–8.2)
WBC # BLD: 6.8 K/CU MM (ref 4–10.5)

## 2019-08-05 PROCEDURE — 84145 PROCALCITONIN (PCT): CPT

## 2019-08-05 PROCEDURE — 97116 GAIT TRAINING THERAPY: CPT

## 2019-08-05 PROCEDURE — 85025 COMPLETE CBC W/AUTO DIFF WBC: CPT

## 2019-08-05 PROCEDURE — 97530 THERAPEUTIC ACTIVITIES: CPT

## 2019-08-05 PROCEDURE — 36415 COLL VENOUS BLD VENIPUNCTURE: CPT

## 2019-08-05 PROCEDURE — 82962 GLUCOSE BLOOD TEST: CPT

## 2019-08-05 PROCEDURE — 80053 COMPREHEN METABOLIC PANEL: CPT

## 2019-08-05 PROCEDURE — 94761 N-INVAS EAR/PLS OXIMETRY MLT: CPT

## 2019-08-05 PROCEDURE — 6370000000 HC RX 637 (ALT 250 FOR IP): Performed by: GENERAL PRACTICE

## 2019-08-05 RX ORDER — CEFDINIR 300 MG/1
300 CAPSULE ORAL 2 TIMES DAILY
Qty: 14 CAPSULE | Refills: 0 | Status: SHIPPED | OUTPATIENT
Start: 2019-08-05 | End: 2019-08-12

## 2019-08-05 RX ADMIN — POTASSIUM CHLORIDE 10 MEQ: 20 TABLET, EXTENDED RELEASE ORAL at 09:40

## 2019-08-05 RX ADMIN — MEMANTINE 10 MG: 10 TABLET ORAL at 09:40

## 2019-08-05 RX ADMIN — APIXABAN 5 MG: 5 TABLET, FILM COATED ORAL at 09:40

## 2019-08-05 RX ADMIN — TAMSULOSIN HYDROCHLORIDE 0.4 MG: 0.4 CAPSULE ORAL at 09:40

## 2019-08-05 RX ADMIN — DULOXETINE HYDROCHLORIDE 30 MG: 30 CAPSULE, DELAYED RELEASE ORAL at 09:40

## 2019-08-05 RX ADMIN — GABAPENTIN 800 MG: 400 CAPSULE ORAL at 09:40

## 2019-08-05 RX ADMIN — SOTALOL HYDROCHLORIDE 40 MG: 80 TABLET ORAL at 09:40

## 2019-08-05 RX ADMIN — MULTIPLE VITAMINS W/ MINERALS TAB 1 TABLET: TAB at 09:40

## 2019-08-05 RX ADMIN — ASPIRIN 81 MG 81 MG: 81 TABLET ORAL at 09:40

## 2019-08-05 RX ADMIN — DICYCLOMINE HYDROCHLORIDE 10 MG: 10 CAPSULE ORAL at 06:58

## 2019-08-05 RX ADMIN — Medication: at 00:09

## 2019-08-05 NOTE — PROGRESS NOTES
Patient's family concerned about patient taking oral medications d/t emesis last night and this morning. Preferred this nurse to hold oral medications. Patient's febrile, temperature 102.8 axillary. Patient's family asked if he could have acetaminophen suppository since he had emesis last night immediately following oral acetaminophen. Called and left message with Dr. Luis Armando Mora, awaiting call back.
Physical Therapy    Physical Therapy Treatment Note  Name: Garfield Johnson MRN: [id number] :   1935   Date:  2019   Admission Date: 2019 Room:  [address] [place] [address]-A   Restrictions/Precautions:        General precautions  Communication with other providers: okay to do tx per RACHAEL Schwarz  Subjective:  Patient states:  \" I am just tired and my legs and feet hurt all the time. \" Pt's wife stated pt is going home this morning. Pain:   Location, Type, Intensity (0/10 to 10/10):  8/10 B LE and feet  Objective:    Observation:  Supine with wife in room at arrival. Agreeable to tx. Wife actively supportive during tx. Treatment, including education/measures:  Vitals:  BP:  117/58 HR:  72  O2 96%    Therapeutic Activity:  SBA rolling vc/tc proper technique, sequencing and safety to improve bed mobility  Kaylynn sup>sit; ModA sit>sup w BR vc/tc proper technique, sequencing and safety to improve bed mobility  CGA sit<>stand vc/tc proper technique, sequencing, WS and safety to improve functional mobility  Kaylynn SPT vc/tc proper technique, sequencing,  WS, WB and safety to improve functional mobility    SBA sitting balance 1 min vc/tc proper technique, posture, WS and safety to improve functional mobility    Comments: Pt demonstrated  Kaylynn trunk with sup>sit from flat bed and use on bed rail and ModA B LE for sit>SL with increased fatigue from ambulation. Pt demonstrated need for modA safety cues and for RW management with leaving RW aside with transfers. Pt and Pt's wife educated on proper height of RW for pt and educated on energy conservation techniques. Gait:  Kaylynn amb w RW  ~ 50 ft + 50 ft vc/tc proper technique, sequencing, posture, Step H/L, earlene, lat deviation and safety to improve functional mobility  Comments: Pt demonstrated slow earlene, fwd flexed posture, fwd head, short H/L steps and narrow LAUREN. Pt demonstrated need for Kaylynn with RW management.  Pt has rapid increased in pain and fatigue with decreased
  Leisure & Hobbies: Watch tractor pulls and tennis, Squats in [address] for support  Additional Comments: Pt's wife reports a fall 3 months ago where pt fell into clock    Examination of body systems (includes body structures/functions, activity/participation limitations):  · Observation:  Supine in bed upon arrival. Agreeable to work with PT/OT. Spouse visiting. · Vision:  [facility], has reading glasses  · Hearing:  bilat hearing aid   · Cardiopulmonary:  Vitals stable throughout session  · Orientation: oriented to person, place, disoriented to time     Musculoskeletal  · ROM R/L:  WFL BLEs. · Strength R/L:  Hips 4+/5, knees and ankles 5/5. Fair in function and endurance. 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 Impaired light touch bilat. Mobility/treatment  · Rolling L/R:  NT  · Supine to sit:  Close SBA/CGA wit use of bed rail, inc effort, and HOB elevated 30 deg  · Transfers:   · Sit to stand: CGA from EOB, Ernestina from standard toilet   · Stand to sit: to standard toilet CGA, to recliner CGA   · Step pivot CGA/Ernestina for device management when sequencing full pivot turn to toilet/recliner   · Sitting balance:  SBA static and light dynamic from EOB/standard toilet   · Standing balance:  CGA at RW static and light dynamic performing self care tasks at toilet. Standing tolerance ~4 minutes   · Gait: 100ft with RW CGA/Ernestina for balance on turns. Fwd trunk, rounded shoulders, dec pace, dec step length bilat. · Educated pt and spouse on POC, role of PT, DME, therapy services at discharge. VCs for UE/LE placement, sequencing, posture for inc safety and independence with mobility. [facility] 6 Clicks Inpatient Mobility:  AM-PAC Inpatient Mobility Raw Score : 16    Safety: patient left in chair with chair alarm, call light within reach, RN notified, gait belt used. Assessment: Body structures, Functions, Activity limitations: Decreased functional mobility ;  Decreased safe awareness; Decreased
7/31/2019 5:26 PM

## 2019-08-05 NOTE — CARE COORDINATION
LSW spoke with pt and wife about PT/OT recommendation and they want pt to go home and are agreeable to have [facility] in the home. LSW gave wife a list of [facility] and she requested [facility]. LSW called [facility] and made referral.  LSW faxed pt HC order, face sheet, PT/OT notes and AVS to [facility].

## 2019-08-11 NOTE — DISCHARGE SUMMARY
(NEURONTIN) 400 MG capsule  Take 800 mg by mouth nightly. memantine (NAMENDA) 10 MG tablet  Take 10 mg by mouth 2 times daily             metFORMIN (GLUCOPHAGE-XR) 500 MG extended release tablet  Take 500 mg by mouth daily             Multiple Vitamin (MULTIVITAMIN PO)  Take 1 tablet by mouth daily. oxyCODONE (ROXICODONE) 5 MG immediate release tablet  Take 7.5 mg by mouth 3 times daily as needed. potassium chloride (KLOR-CON M) 10 MEQ extended release tablet  Take 10 mEq by mouth every morning             potassium chloride (KLOR-CON) 10 MEQ extended release tablet  TAKE 1 TABLET BY MOUTH ONCE DAILY             sotalol (BETAPACE) 80 MG tablet  Take 40 mg by mouth 2 times daily.              tamsulosin (FLOMAX) 0.4 MG capsule  Take 0.4 mg by mouth nightly              terazosin (HYTRIN) 2 MG capsule  Take 2 mg by mouth                    Activity: activity as tolerated  Diet: diabetic diet    Follow-up with Curly Melgoza in 7 days    Signed: Curly Melgoza    Time spent on discharge 35 minutes

## 2019-09-28 ENCOUNTER — APPOINTMENT (OUTPATIENT)
Dept: CT IMAGING | Age: 84
End: 2019-09-28
Payer: COMMERCIAL

## 2019-09-28 ENCOUNTER — HOSPITAL ENCOUNTER (EMERGENCY)
Age: 84
Discharge: HOME | End: 2019-09-28
Attending: EMERGENCY MEDICINE
Payer: COMMERCIAL

## 2019-09-28 ENCOUNTER — APPOINTMENT (OUTPATIENT)
Dept: GENERAL RADIOLOGY | Age: 84
End: 2019-09-28
Payer: COMMERCIAL

## 2019-09-28 VITALS
BODY MASS INDEX: 26.88 KG/M2 | TEMPERATURE: 98.7 F | SYSTOLIC BLOOD PRESSURE: 120 MMHG | WEIGHT: 192 LBS | HEIGHT: 71 IN | DIASTOLIC BLOOD PRESSURE: 80 MMHG | OXYGEN SATURATION: 97 % | RESPIRATION RATE: 18 BRPM | HEART RATE: 78 BPM

## 2019-09-28 DIAGNOSIS — R53.1 GENERAL WEAKNESS: ICD-10-CM

## 2019-09-28 DIAGNOSIS — R53.83 FATIGUE, UNSPECIFIED TYPE: Primary | ICD-10-CM

## 2019-09-28 LAB
ALBUMIN SERPL-MCNC: 3.6 GM/DL (ref 3.4–5)
ALP BLD-CCNC: 78 IU/L (ref 40–129)
ALT SERPL-CCNC: 10 U/L (ref 10–40)
AMMONIA: 19 UMOL/L (ref 16–60)
ANION GAP SERPL CALCULATED.3IONS-SCNC: 6 MMOL/L (ref 4–16)
AST SERPL-CCNC: 15 IU/L (ref 15–37)
BACTERIA: NEGATIVE /HPF
BASOPHILS ABSOLUTE: 0 K/CU MM
BASOPHILS RELATIVE PERCENT: 0.5 % (ref 0–1)
BILIRUB SERPL-MCNC: 0.9 MG/DL (ref 0–1)
BILIRUBIN URINE: NEGATIVE MG/DL
BLOOD, URINE: NEGATIVE
BUN BLDV-MCNC: 15 MG/DL (ref 6–23)
CALCIUM SERPL-MCNC: 8.5 MG/DL (ref 8.3–10.6)
CHLORIDE BLD-SCNC: 96 MMOL/L (ref 99–110)
CLARITY: CLEAR
CO2: 29 MMOL/L (ref 21–32)
COLOR: YELLOW
CREAT SERPL-MCNC: 1.2 MG/DL (ref 0.9–1.3)
DIFFERENTIAL TYPE: ABNORMAL
EOSINOPHILS ABSOLUTE: 0.1 K/CU MM
EOSINOPHILS RELATIVE PERCENT: 2.2 % (ref 0–3)
GFR AFRICAN AMERICAN: >60 ML/MIN/1.73M2
GFR NON-AFRICAN AMERICAN: 58 ML/MIN/1.73M2
GLUCOSE BLD-MCNC: 176 MG/DL (ref 70–99)
GLUCOSE, URINE: NEGATIVE MG/DL
HCT VFR BLD CALC: 36.5 % (ref 42–52)
HEMOGLOBIN: 11.7 GM/DL (ref 13.5–18)
HYALINE CASTS: 2 /LPF
IMMATURE NEUTROPHIL %: 1.2 % (ref 0–0.43)
KETONES, URINE: NEGATIVE MG/DL
LACTATE: 1.8 MMOL/L (ref 0.4–2)
LEUKOCYTE ESTERASE, URINE: NEGATIVE
LIPASE: 15 IU/L (ref 13–60)
LYMPHOCYTES ABSOLUTE: 2.3 K/CU MM
LYMPHOCYTES RELATIVE PERCENT: 38.2 % (ref 24–44)
MCH RBC QN AUTO: 32.3 PG (ref 27–31)
MCHC RBC AUTO-ENTMCNC: 32.1 % (ref 32–36)
MCV RBC AUTO: 100.8 FL (ref 78–100)
MONOCYTES ABSOLUTE: 0.4 K/CU MM
MONOCYTES RELATIVE PERCENT: 7 % (ref 0–4)
MUCUS: ABNORMAL HPF
NITRITE URINE, QUANTITATIVE: NEGATIVE
NUCLEATED RBC %: 0.8 %
PDW BLD-RTO: 19.8 % (ref 11.7–14.9)
PH, URINE: 6 (ref 5–8)
PLATELET # BLD: 223 K/CU MM (ref 140–440)
PMV BLD AUTO: 9.6 FL (ref 7.5–11.1)
POTASSIUM SERPL-SCNC: 4.1 MMOL/L (ref 3.5–5.1)
PRO-BNP: 3196 PG/ML
PROTEIN UA: NEGATIVE MG/DL
RBC # BLD: 3.62 M/CU MM (ref 4.6–6.2)
RBC URINE: <1 /HPF (ref 0–3)
SEGMENTED NEUTROPHILS ABSOLUTE COUNT: 3 K/CU MM
SEGMENTED NEUTROPHILS RELATIVE PERCENT: 50.9 % (ref 36–66)
SODIUM BLD-SCNC: 131 MMOL/L (ref 135–145)
SPECIFIC GRAVITY UA: 1.01 (ref 1–1.03)
SQUAMOUS EPITHELIAL: <1 /HPF
TOTAL IMMATURE NEUTOROPHIL: 0.07 K/CU MM
TOTAL NUCLEATED RBC: 0.1 K/CU MM
TOTAL PROTEIN: 6.3 GM/DL (ref 6.4–8.2)
TRICHOMONAS: ABNORMAL /HPF
TROPONIN T: <0.01 NG/ML
UROBILINOGEN, URINE: NORMAL MG/DL (ref 0.2–1)
WBC # BLD: 5.9 K/CU MM (ref 4–10.5)
WBC UA: <1 /HPF (ref 0–2)

## 2019-09-28 PROCEDURE — 70450 CT HEAD/BRAIN W/O DYE: CPT

## 2019-09-28 PROCEDURE — 82140 ASSAY OF AMMONIA: CPT

## 2019-09-28 PROCEDURE — 84484 ASSAY OF TROPONIN QUANT: CPT

## 2019-09-28 PROCEDURE — 99285 EMERGENCY DEPT VISIT HI MDM: CPT

## 2019-09-28 PROCEDURE — 36415 COLL VENOUS BLD VENIPUNCTURE: CPT

## 2019-09-28 PROCEDURE — 83880 ASSAY OF NATRIURETIC PEPTIDE: CPT

## 2019-09-28 PROCEDURE — 85025 COMPLETE CBC W/AUTO DIFF WBC: CPT

## 2019-09-28 PROCEDURE — 83690 ASSAY OF LIPASE: CPT

## 2019-09-28 PROCEDURE — 80053 COMPREHEN METABOLIC PANEL: CPT

## 2019-09-28 PROCEDURE — 93005 ELECTROCARDIOGRAM TRACING: CPT | Performed by: EMERGENCY MEDICINE

## 2019-09-28 PROCEDURE — 81001 URINALYSIS AUTO W/SCOPE: CPT

## 2019-09-28 PROCEDURE — 83605 ASSAY OF LACTIC ACID: CPT

## 2019-09-28 PROCEDURE — 87086 URINE CULTURE/COLONY COUNT: CPT

## 2019-09-28 PROCEDURE — 87040 BLOOD CULTURE FOR BACTERIA: CPT

## 2019-09-28 PROCEDURE — 71045 X-RAY EXAM CHEST 1 VIEW: CPT

## 2019-09-29 LAB
CULTURE: NORMAL
Lab: NORMAL
SPECIMEN: NORMAL

## 2019-09-29 PROCEDURE — 93010 ELECTROCARDIOGRAM REPORT: CPT | Performed by: INTERNAL MEDICINE

## 2019-10-02 LAB
EKG DIAGNOSIS: NORMAL
EKG Q-T INTERVAL: 460 MS
EKG QRS DURATION: 138 MS
EKG QTC CALCULATION (BAZETT): 520 MS
EKG R AXIS: 262 DEGREES
EKG T AXIS: 14 DEGREES
EKG VENTRICULAR RATE: 77 BPM

## 2019-10-03 LAB
CULTURE: NORMAL
Lab: NORMAL
SPECIMEN: NORMAL

## 2019-11-18 ENCOUNTER — APPOINTMENT (OUTPATIENT)
Dept: CT IMAGING | Age: 84
End: 2019-11-18
Payer: COMMERCIAL

## 2019-11-18 ENCOUNTER — HOSPITAL ENCOUNTER (EMERGENCY)
Age: 84
Discharge: HOME | End: 2019-11-18
Attending: EMERGENCY MEDICINE
Payer: COMMERCIAL

## 2019-11-18 VITALS
BODY MASS INDEX: 26.2 KG/M2 | WEIGHT: 183 LBS | HEART RATE: 81 BPM | SYSTOLIC BLOOD PRESSURE: 99 MMHG | HEIGHT: 70 IN | OXYGEN SATURATION: 94 % | TEMPERATURE: 97.7 F | RESPIRATION RATE: 13 BRPM | DIASTOLIC BLOOD PRESSURE: 64 MMHG

## 2019-11-18 DIAGNOSIS — F03.90 DEMENTIA WITHOUT BEHAVIORAL DISTURBANCE, UNSPECIFIED DEMENTIA TYPE: ICD-10-CM

## 2019-11-18 DIAGNOSIS — R44.3 HALLUCINATIONS: Primary | ICD-10-CM

## 2019-11-18 LAB
ALBUMIN SERPL-MCNC: 3.5 GM/DL (ref 3.4–5)
ALP BLD-CCNC: 73 IU/L (ref 40–129)
ALT SERPL-CCNC: 10 U/L (ref 10–40)
ANION GAP SERPL CALCULATED.3IONS-SCNC: 9 MMOL/L (ref 4–16)
AST SERPL-CCNC: 13 IU/L (ref 15–37)
BACTERIA: NEGATIVE /HPF
BASOPHILS ABSOLUTE: 0 K/CU MM
BASOPHILS RELATIVE PERCENT: 0.3 % (ref 0–1)
BILIRUB SERPL-MCNC: 1.1 MG/DL (ref 0–1)
BILIRUBIN URINE: NEGATIVE MG/DL
BLOOD, URINE: NEGATIVE
BUN BLDV-MCNC: 14 MG/DL (ref 6–23)
CALCIUM SERPL-MCNC: 9.3 MG/DL (ref 8.3–10.6)
CHLORIDE BLD-SCNC: 97 MMOL/L (ref 99–110)
CLARITY: CLEAR
CO2: 29 MMOL/L (ref 21–32)
COLOR: YELLOW
CREAT SERPL-MCNC: 1.3 MG/DL (ref 0.9–1.3)
DIFFERENTIAL TYPE: ABNORMAL
EOSINOPHILS ABSOLUTE: 0.1 K/CU MM
EOSINOPHILS RELATIVE PERCENT: 1.9 % (ref 0–3)
GFR AFRICAN AMERICAN: >60 ML/MIN/1.73M2
GFR NON-AFRICAN AMERICAN: 53 ML/MIN/1.73M2
GLUCOSE BLD-MCNC: 214 MG/DL (ref 70–99)
GLUCOSE BLD-MCNC: 226 MG/DL (ref 70–99)
GLUCOSE, URINE: NEGATIVE MG/DL
HCT VFR BLD CALC: 38.9 % (ref 42–52)
HEMOGLOBIN: 12.7 GM/DL (ref 13.5–18)
HYALINE CASTS: 2 /LPF
IMMATURE NEUTROPHIL %: 0.3 % (ref 0–0.43)
INR BLD: 1.35 INDEX
KETONES, URINE: NEGATIVE MG/DL
LEUKOCYTE ESTERASE, URINE: NEGATIVE
LYMPHOCYTES ABSOLUTE: 2 K/CU MM
LYMPHOCYTES RELATIVE PERCENT: 35.1 % (ref 24–44)
MCH RBC QN AUTO: 32.8 PG (ref 27–31)
MCHC RBC AUTO-ENTMCNC: 32.6 % (ref 32–36)
MCV RBC AUTO: 100.5 FL (ref 78–100)
MONOCYTES ABSOLUTE: 0.5 K/CU MM
MONOCYTES RELATIVE PERCENT: 9 % (ref 0–4)
MUCUS: ABNORMAL HPF
NITRITE URINE, QUANTITATIVE: NEGATIVE
NUCLEATED RBC %: 0.5 %
PDW BLD-RTO: 19.8 % (ref 11.7–14.9)
PH, URINE: 5 (ref 5–8)
PLATELET # BLD: 201 K/CU MM (ref 140–440)
PMV BLD AUTO: 9.7 FL (ref 7.5–11.1)
POTASSIUM SERPL-SCNC: 3.9 MMOL/L (ref 3.5–5.1)
PROTEIN UA: NEGATIVE MG/DL
PROTHROMBIN TIME: 15.7 SECONDS (ref 11.7–14.5)
RBC # BLD: 3.87 M/CU MM (ref 4.6–6.2)
RBC URINE: 2 /HPF (ref 0–3)
SEGMENTED NEUTROPHILS ABSOLUTE COUNT: 3.1 K/CU MM
SEGMENTED NEUTROPHILS RELATIVE PERCENT: 53.4 % (ref 36–66)
SODIUM BLD-SCNC: 135 MMOL/L (ref 135–145)
SPECIFIC GRAVITY UA: 1.01 (ref 1–1.03)
TOTAL IMMATURE NEUTOROPHIL: 0.02 K/CU MM
TOTAL NUCLEATED RBC: 0 K/CU MM
TOTAL PROTEIN: 6.3 GM/DL (ref 6.4–8.2)
TRICHOMONAS: ABNORMAL /HPF
TROPONIN T: <0.01 NG/ML
UROBILINOGEN, URINE: NORMAL MG/DL (ref 0.2–1)
WBC # BLD: 5.8 K/CU MM (ref 4–10.5)
WBC UA: <1 /HPF (ref 0–2)

## 2019-11-18 PROCEDURE — 85025 COMPLETE CBC W/AUTO DIFF WBC: CPT

## 2019-11-18 PROCEDURE — 85610 PROTHROMBIN TIME: CPT

## 2019-11-18 PROCEDURE — 70450 CT HEAD/BRAIN W/O DYE: CPT

## 2019-11-18 PROCEDURE — 99285 EMERGENCY DEPT VISIT HI MDM: CPT

## 2019-11-18 PROCEDURE — 84484 ASSAY OF TROPONIN QUANT: CPT

## 2019-11-18 PROCEDURE — 93005 ELECTROCARDIOGRAM TRACING: CPT | Performed by: EMERGENCY MEDICINE

## 2019-11-18 PROCEDURE — 80053 COMPREHEN METABOLIC PANEL: CPT

## 2019-11-18 PROCEDURE — 82962 GLUCOSE BLOOD TEST: CPT

## 2019-11-18 PROCEDURE — 81001 URINALYSIS AUTO W/SCOPE: CPT

## 2019-11-19 PROCEDURE — 93010 ELECTROCARDIOGRAM REPORT: CPT | Performed by: INTERNAL MEDICINE

## 2019-11-27 LAB
EKG ATRIAL RATE: 214 BPM
EKG DIAGNOSIS: NORMAL
EKG Q-T INTERVAL: 450 MS
EKG QRS DURATION: 136 MS
EKG QTC CALCULATION (BAZETT): 499 MS
EKG R AXIS: -82 DEGREES
EKG T AXIS: 4 DEGREES
EKG VENTRICULAR RATE: 74 BPM

## 2019-12-23 ENCOUNTER — APPOINTMENT (OUTPATIENT)
Dept: CT IMAGING | Age: 84
End: 2019-12-23
Payer: COMMERCIAL

## 2019-12-23 ENCOUNTER — HOSPITAL ENCOUNTER (EMERGENCY)
Age: 84
Discharge: HOME | End: 2019-12-23
Attending: EMERGENCY MEDICINE
Payer: COMMERCIAL

## 2019-12-23 VITALS
OXYGEN SATURATION: 96 % | WEIGHT: 183 LBS | BODY MASS INDEX: 26.2 KG/M2 | HEIGHT: 70 IN | SYSTOLIC BLOOD PRESSURE: 143 MMHG | RESPIRATION RATE: 10 BRPM | TEMPERATURE: 97.9 F | HEART RATE: 65 BPM | DIASTOLIC BLOOD PRESSURE: 74 MMHG

## 2019-12-23 DIAGNOSIS — K62.5 RECTAL BLEEDING: Primary | ICD-10-CM

## 2019-12-23 DIAGNOSIS — N39.0 URINARY TRACT INFECTION WITHOUT HEMATURIA, SITE UNSPECIFIED: ICD-10-CM

## 2019-12-23 DIAGNOSIS — F03.90 DEMENTIA WITHOUT BEHAVIORAL DISTURBANCE, UNSPECIFIED DEMENTIA TYPE: ICD-10-CM

## 2019-12-23 LAB
ABO/RH: NORMAL
ALBUMIN SERPL-MCNC: 3.6 GM/DL (ref 3.4–5)
ALP BLD-CCNC: 77 IU/L (ref 40–129)
ALT SERPL-CCNC: 11 U/L (ref 10–40)
ANION GAP SERPL CALCULATED.3IONS-SCNC: 10 MMOL/L (ref 4–16)
ANTIBODY SCREEN: NEGATIVE
AST SERPL-CCNC: 17 IU/L (ref 15–37)
BACTERIA: NEGATIVE /HPF
BASOPHILS ABSOLUTE: 0.1 K/CU MM
BASOPHILS RELATIVE PERCENT: 0.7 % (ref 0–1)
BILIRUB SERPL-MCNC: 0.9 MG/DL (ref 0–1)
BILIRUBIN URINE: NEGATIVE MG/DL
BLOOD, URINE: NEGATIVE
BUN BLDV-MCNC: 14 MG/DL (ref 6–23)
CALCIUM SERPL-MCNC: 9.1 MG/DL (ref 8.3–10.6)
CHLORIDE BLD-SCNC: 99 MMOL/L (ref 99–110)
CLARITY: ABNORMAL
CO2: 26 MMOL/L (ref 21–32)
COLOR: YELLOW
CREAT SERPL-MCNC: 1.1 MG/DL (ref 0.9–1.3)
DIFFERENTIAL TYPE: ABNORMAL
EOSINOPHILS ABSOLUTE: 0.2 K/CU MM
EOSINOPHILS RELATIVE PERCENT: 2.8 % (ref 0–3)
GFR AFRICAN AMERICAN: >60 ML/MIN/1.73M2
GFR NON-AFRICAN AMERICAN: >60 ML/MIN/1.73M2
GLUCOSE BLD-MCNC: 88 MG/DL (ref 70–99)
GLUCOSE, URINE: NEGATIVE MG/DL
HCT VFR BLD CALC: 38.5 % (ref 42–52)
HEMOGLOBIN: 12.2 GM/DL (ref 13.5–18)
IMMATURE NEUTROPHIL %: 1.2 % (ref 0–0.43)
KETONES, URINE: NEGATIVE MG/DL
LACTATE: 1.5 MMOL/L (ref 0.4–2)
LEUKOCYTE ESTERASE, URINE: ABNORMAL
LIPASE: 14 IU/L (ref 13–60)
LYMPHOCYTES ABSOLUTE: 2.1 K/CU MM
LYMPHOCYTES RELATIVE PERCENT: 31 % (ref 24–44)
MCH RBC QN AUTO: 32.3 PG (ref 27–31)
MCHC RBC AUTO-ENTMCNC: 31.7 % (ref 32–36)
MCV RBC AUTO: 101.9 FL (ref 78–100)
MONOCYTES ABSOLUTE: 0.6 K/CU MM
MONOCYTES RELATIVE PERCENT: 9.2 % (ref 0–4)
MUCUS: ABNORMAL HPF
NITRITE URINE, QUANTITATIVE: NEGATIVE
NUCLEATED RBC %: 1.3 %
PDW BLD-RTO: 20.3 % (ref 11.7–14.9)
PH, URINE: 5 (ref 5–8)
PLATELET # BLD: 277 K/CU MM (ref 140–440)
PMV BLD AUTO: 9 FL (ref 7.5–11.1)
POTASSIUM SERPL-SCNC: 4.4 MMOL/L (ref 3.5–5.1)
PROTEIN UA: 30 MG/DL
RBC # BLD: 3.78 M/CU MM (ref 4.6–6.2)
RBC URINE: ABNORMAL /HPF (ref 0–3)
SEGMENTED NEUTROPHILS ABSOLUTE COUNT: 3.7 K/CU MM
SEGMENTED NEUTROPHILS RELATIVE PERCENT: 55.1 % (ref 36–66)
SODIUM BLD-SCNC: 135 MMOL/L (ref 135–145)
SPECIFIC GRAVITY UA: 1.01 (ref 1–1.03)
SQUAMOUS EPITHELIAL: 1 /HPF
TOTAL IMMATURE NEUTOROPHIL: 0.08 K/CU MM
TOTAL NUCLEATED RBC: 0.1 K/CU MM
TOTAL PROTEIN: 6.8 GM/DL (ref 6.4–8.2)
TRICHOMONAS: ABNORMAL /HPF
UROBILINOGEN, URINE: NORMAL MG/DL (ref 0.2–1)
WBC # BLD: 6.7 K/CU MM (ref 4–10.5)
WBC CLUMP: ABNORMAL /HPF
WBC UA: 823 /HPF (ref 0–2)

## 2019-12-23 PROCEDURE — 99284 EMERGENCY DEPT VISIT MOD MDM: CPT

## 2019-12-23 PROCEDURE — 96360 HYDRATION IV INFUSION INIT: CPT

## 2019-12-23 PROCEDURE — 81001 URINALYSIS AUTO W/SCOPE: CPT

## 2019-12-23 PROCEDURE — 80053 COMPREHEN METABOLIC PANEL: CPT

## 2019-12-23 PROCEDURE — 85025 COMPLETE CBC W/AUTO DIFF WBC: CPT

## 2019-12-23 PROCEDURE — 86900 BLOOD TYPING SEROLOGIC ABO: CPT

## 2019-12-23 PROCEDURE — 6360000004 HC RX CONTRAST MEDICATION: Performed by: EMERGENCY MEDICINE

## 2019-12-23 PROCEDURE — 86901 BLOOD TYPING SEROLOGIC RH(D): CPT

## 2019-12-23 PROCEDURE — 96361 HYDRATE IV INFUSION ADD-ON: CPT

## 2019-12-23 PROCEDURE — 83605 ASSAY OF LACTIC ACID: CPT

## 2019-12-23 PROCEDURE — 74177 CT ABD & PELVIS W/CONTRAST: CPT

## 2019-12-23 PROCEDURE — 36415 COLL VENOUS BLD VENIPUNCTURE: CPT

## 2019-12-23 PROCEDURE — 83690 ASSAY OF LIPASE: CPT

## 2019-12-23 PROCEDURE — 2580000003 HC RX 258: Performed by: EMERGENCY MEDICINE

## 2019-12-23 PROCEDURE — 86850 RBC ANTIBODY SCREEN: CPT

## 2019-12-23 RX ORDER — DICYCLOMINE HYDROCHLORIDE 10 MG/1
10 CAPSULE ORAL 3 TIMES DAILY
Qty: 15 CAPSULE | Refills: 3 | Status: SHIPPED | OUTPATIENT
Start: 2019-12-23

## 2019-12-23 RX ORDER — CEPHALEXIN 500 MG/1
500 CAPSULE ORAL 2 TIMES DAILY
Qty: 20 CAPSULE | Refills: 0 | Status: SHIPPED | OUTPATIENT
Start: 2019-12-23 | End: 2020-01-02

## 2019-12-23 RX ORDER — OMEPRAZOLE 20 MG/1
20 CAPSULE, DELAYED RELEASE ORAL
Qty: 180 CAPSULE | Refills: 1 | Status: SHIPPED | OUTPATIENT
Start: 2019-12-23

## 2019-12-23 RX ORDER — 0.9 % SODIUM CHLORIDE 0.9 %
1000 INTRAVENOUS SOLUTION INTRAVENOUS ONCE
Status: COMPLETED | OUTPATIENT
Start: 2019-12-23 | End: 2019-12-23

## 2019-12-23 RX ORDER — CEPHALEXIN 250 MG/1
500 CAPSULE ORAL ONCE
Status: DISCONTINUED | OUTPATIENT
Start: 2019-12-23 | End: 2019-12-23 | Stop reason: HOSPADM

## 2019-12-23 RX ORDER — FAMOTIDINE 20 MG/1
20 TABLET, FILM COATED ORAL 2 TIMES DAILY
Qty: 14 TABLET | Refills: 0 | Status: ON HOLD | OUTPATIENT
Start: 2019-12-23 | End: 2020-07-10 | Stop reason: HOSPADM

## 2019-12-23 RX ADMIN — IOPAMIDOL 80 ML: 755 INJECTION, SOLUTION INTRAVENOUS at 15:56

## 2019-12-23 RX ADMIN — SODIUM CHLORIDE 1000 ML: 9 INJECTION, SOLUTION INTRAVENOUS at 14:59

## 2019-12-23 ASSESSMENT — ENCOUNTER SYMPTOMS: BLOOD IN STOOL: 1

## 2020-03-08 ENCOUNTER — APPOINTMENT (OUTPATIENT)
Dept: GENERAL RADIOLOGY | Age: 85
DRG: 392 | End: 2020-03-08
Payer: COMMERCIAL

## 2020-03-08 ENCOUNTER — APPOINTMENT (OUTPATIENT)
Dept: CT IMAGING | Age: 85
DRG: 392 | End: 2020-03-08
Payer: COMMERCIAL

## 2020-03-08 ENCOUNTER — HOSPITAL ENCOUNTER (INPATIENT)
Age: 85
LOS: 4 days | Discharge: NURSING, REHAB OR LONG-TERM CARE | DRG: 392 | End: 2020-03-12
Attending: EMERGENCY MEDICINE | Admitting: INTERNAL MEDICINE
Payer: COMMERCIAL

## 2020-03-08 PROBLEM — R19.7 DIARRHEA: Status: ACTIVE | Noted: 2020-03-08

## 2020-03-08 LAB
ABO/RH: NORMAL
ALBUMIN SERPL-MCNC: 3.4 GM/DL (ref 3.4–5)
ALP BLD-CCNC: 128 IU/L (ref 40–129)
ALT SERPL-CCNC: 15 U/L (ref 10–40)
ANION GAP SERPL CALCULATED.3IONS-SCNC: 11 MMOL/L (ref 4–16)
ANTIBODY SCREEN: NEGATIVE
AST SERPL-CCNC: 15 IU/L (ref 15–37)
BACTERIA: ABNORMAL /HPF
BASOPHILS ABSOLUTE: 0 K/CU MM
BASOPHILS RELATIVE PERCENT: 0.3 % (ref 0–1)
BILIRUB SERPL-MCNC: 1.4 MG/DL (ref 0–1)
BILIRUBIN URINE: NEGATIVE MG/DL
BLOOD, URINE: NEGATIVE
BUN BLDV-MCNC: 20 MG/DL (ref 6–23)
CALCIUM SERPL-MCNC: 8.6 MG/DL (ref 8.3–10.6)
CHLORIDE BLD-SCNC: 97 MMOL/L (ref 99–110)
CLARITY: CLEAR
CO2: 26 MMOL/L (ref 21–32)
COLOR: YELLOW
CREAT SERPL-MCNC: 1.1 MG/DL (ref 0.9–1.3)
DIFFERENTIAL TYPE: ABNORMAL
EOSINOPHILS ABSOLUTE: 0.2 K/CU MM
EOSINOPHILS RELATIVE PERCENT: 1.6 % (ref 0–3)
GFR AFRICAN AMERICAN: >60 ML/MIN/1.73M2
GFR NON-AFRICAN AMERICAN: >60 ML/MIN/1.73M2
GLUCOSE BLD-MCNC: 123 MG/DL (ref 70–99)
GLUCOSE BLD-MCNC: 135 MG/DL (ref 70–99)
GLUCOSE BLD-MCNC: 135 MG/DL (ref 70–99)
GLUCOSE BLD-MCNC: 136 MG/DL (ref 70–99)
GLUCOSE, URINE: NEGATIVE MG/DL
HCT VFR BLD CALC: 44.2 % (ref 42–52)
HEMOGLOBIN: 14 GM/DL (ref 13.5–18)
HYALINE CASTS: 2 /LPF
IMMATURE NEUTROPHIL %: 2 % (ref 0–0.43)
KETONES, URINE: NEGATIVE MG/DL
LACTATE: 1.8 MMOL/L (ref 0.4–2)
LACTATE: ABNORMAL MMOL/L (ref 0.4–2)
LEUKOCYTE ESTERASE, URINE: NEGATIVE
LYMPHOCYTES ABSOLUTE: 2.4 K/CU MM
LYMPHOCYTES RELATIVE PERCENT: 20 % (ref 24–44)
MCH RBC QN AUTO: 32.1 PG (ref 27–31)
MCHC RBC AUTO-ENTMCNC: 31.7 % (ref 32–36)
MCV RBC AUTO: 101.4 FL (ref 78–100)
MONOCYTES ABSOLUTE: 0.9 K/CU MM
MONOCYTES RELATIVE PERCENT: 7 % (ref 0–4)
MUCUS: ABNORMAL HPF
NITRITE URINE, QUANTITATIVE: NEGATIVE
NUCLEATED RBC %: 0.3 %
PDW BLD-RTO: 21.2 % (ref 11.7–14.9)
PH, URINE: 6 (ref 5–8)
PLATELET # BLD: 289 K/CU MM (ref 140–440)
PMV BLD AUTO: 10.2 FL (ref 7.5–11.1)
POTASSIUM SERPL-SCNC: 4.4 MMOL/L (ref 3.5–5.1)
PROTEIN UA: NEGATIVE MG/DL
RBC # BLD: 4.36 M/CU MM (ref 4.6–6.2)
RBC URINE: 8 /HPF (ref 0–3)
REASON FOR REJECTION: NORMAL
REASON FOR REJECTION: NORMAL
REJECTED TEST: NORMAL
SEGMENTED NEUTROPHILS ABSOLUTE COUNT: 8.5 K/CU MM
SEGMENTED NEUTROPHILS RELATIVE PERCENT: 69.1 % (ref 36–66)
SODIUM BLD-SCNC: 134 MMOL/L (ref 135–145)
SPECIFIC GRAVITY UA: 1.03 (ref 1–1.03)
SPECIFIC GRAVITY UA: ABNORMAL (ref 1–1.03)
SQUAMOUS EPITHELIAL: <1 /HPF
TOTAL IMMATURE NEUTOROPHIL: 0.24 K/CU MM
TOTAL NUCLEATED RBC: 0 K/CU MM
TOTAL PROTEIN: 6 GM/DL (ref 6.4–8.2)
TRICHOMONAS: ABNORMAL /HPF
TROPONIN T: <0.01 NG/ML
UROBILINOGEN, URINE: NORMAL MG/DL (ref 0.2–1)
WBC # BLD: 12.2 K/CU MM (ref 4–10.5)
WBC UA: 1 /HPF (ref 0–2)

## 2020-03-08 PROCEDURE — 86850 RBC ANTIBODY SCREEN: CPT

## 2020-03-08 PROCEDURE — 84484 ASSAY OF TROPONIN QUANT: CPT

## 2020-03-08 PROCEDURE — 1200000000 HC SEMI PRIVATE

## 2020-03-08 PROCEDURE — 96360 HYDRATION IV INFUSION INIT: CPT

## 2020-03-08 PROCEDURE — 81001 URINALYSIS AUTO W/SCOPE: CPT

## 2020-03-08 PROCEDURE — 80053 COMPREHEN METABOLIC PANEL: CPT

## 2020-03-08 PROCEDURE — 36415 COLL VENOUS BLD VENIPUNCTURE: CPT

## 2020-03-08 PROCEDURE — 71045 X-RAY EXAM CHEST 1 VIEW: CPT

## 2020-03-08 PROCEDURE — 93010 ELECTROCARDIOGRAM REPORT: CPT | Performed by: INTERNAL MEDICINE

## 2020-03-08 PROCEDURE — 73562 X-RAY EXAM OF KNEE 3: CPT

## 2020-03-08 PROCEDURE — 93005 ELECTROCARDIOGRAM TRACING: CPT | Performed by: FAMILY MEDICINE

## 2020-03-08 PROCEDURE — 2580000003 HC RX 258: Performed by: EMERGENCY MEDICINE

## 2020-03-08 PROCEDURE — 87040 BLOOD CULTURE FOR BACTERIA: CPT

## 2020-03-08 PROCEDURE — 73502 X-RAY EXAM HIP UNI 2-3 VIEWS: CPT

## 2020-03-08 PROCEDURE — G0378 HOSPITAL OBSERVATION PER HR: HCPCS

## 2020-03-08 PROCEDURE — 85025 COMPLETE CBC W/AUTO DIFF WBC: CPT

## 2020-03-08 PROCEDURE — 74177 CT ABD & PELVIS W/CONTRAST: CPT

## 2020-03-08 PROCEDURE — 70450 CT HEAD/BRAIN W/O DYE: CPT

## 2020-03-08 PROCEDURE — 86900 BLOOD TYPING SEROLOGIC ABO: CPT

## 2020-03-08 PROCEDURE — 86901 BLOOD TYPING SEROLOGIC RH(D): CPT

## 2020-03-08 PROCEDURE — 6370000000 HC RX 637 (ALT 250 FOR IP): Performed by: INTERNAL MEDICINE

## 2020-03-08 PROCEDURE — 2580000003 HC RX 258: Performed by: INTERNAL MEDICINE

## 2020-03-08 PROCEDURE — 6360000004 HC RX CONTRAST MEDICATION: Performed by: FAMILY MEDICINE

## 2020-03-08 PROCEDURE — 82962 GLUCOSE BLOOD TEST: CPT

## 2020-03-08 PROCEDURE — 83605 ASSAY OF LACTIC ACID: CPT

## 2020-03-08 PROCEDURE — 99291 CRITICAL CARE FIRST HOUR: CPT

## 2020-03-08 RX ORDER — DEXTROSE MONOHYDRATE 25 G/50ML
12.5 INJECTION, SOLUTION INTRAVENOUS PRN
Status: DISCONTINUED | OUTPATIENT
Start: 2020-03-08 | End: 2020-03-12 | Stop reason: HOSPADM

## 2020-03-08 RX ORDER — SODIUM CHLORIDE 0.9 % (FLUSH) 0.9 %
10 SYRINGE (ML) INJECTION EVERY 12 HOURS SCHEDULED
Status: DISCONTINUED | OUTPATIENT
Start: 2020-03-08 | End: 2020-03-12 | Stop reason: HOSPADM

## 2020-03-08 RX ORDER — SOTALOL HYDROCHLORIDE 80 MG/1
40 TABLET ORAL 2 TIMES DAILY
Status: DISCONTINUED | OUTPATIENT
Start: 2020-03-08 | End: 2020-03-12 | Stop reason: HOSPADM

## 2020-03-08 RX ORDER — ACETAMINOPHEN 80 MG
TABLET,CHEWABLE ORAL
Status: DISPENSED
Start: 2020-03-08 | End: 2020-03-09

## 2020-03-08 RX ORDER — 0.9 % SODIUM CHLORIDE 0.9 %
1000 INTRAVENOUS SOLUTION INTRAVENOUS ONCE
Status: COMPLETED | OUTPATIENT
Start: 2020-03-08 | End: 2020-03-08

## 2020-03-08 RX ORDER — ACETAMINOPHEN 325 MG/1
650 TABLET ORAL EVERY 6 HOURS PRN
Status: DISCONTINUED | OUTPATIENT
Start: 2020-03-08 | End: 2020-03-12 | Stop reason: HOSPADM

## 2020-03-08 RX ORDER — DULOXETIN HYDROCHLORIDE 30 MG/1
60 CAPSULE, DELAYED RELEASE ORAL DAILY
Status: DISCONTINUED | OUTPATIENT
Start: 2020-03-08 | End: 2020-03-12 | Stop reason: HOSPADM

## 2020-03-08 RX ORDER — PROMETHAZINE HYDROCHLORIDE 25 MG/1
12.5 TABLET ORAL EVERY 6 HOURS PRN
Status: DISCONTINUED | OUTPATIENT
Start: 2020-03-08 | End: 2020-03-12 | Stop reason: HOSPADM

## 2020-03-08 RX ORDER — OXYCODONE HYDROCHLORIDE 5 MG/1
7.5 TABLET ORAL 3 TIMES DAILY PRN
Status: DISCONTINUED | OUTPATIENT
Start: 2020-03-08 | End: 2020-03-12 | Stop reason: HOSPADM

## 2020-03-08 RX ORDER — SODIUM CHLORIDE 0.9 % (FLUSH) 0.9 %
10 SYRINGE (ML) INJECTION PRN
Status: DISCONTINUED | OUTPATIENT
Start: 2020-03-08 | End: 2020-03-12 | Stop reason: HOSPADM

## 2020-03-08 RX ORDER — MEMANTINE HYDROCHLORIDE 10 MG/1
10 TABLET ORAL 2 TIMES DAILY
Status: DISCONTINUED | OUTPATIENT
Start: 2020-03-08 | End: 2020-03-12 | Stop reason: HOSPADM

## 2020-03-08 RX ORDER — ACETAMINOPHEN 650 MG/1
650 SUPPOSITORY RECTAL EVERY 6 HOURS PRN
Status: DISCONTINUED | OUTPATIENT
Start: 2020-03-08 | End: 2020-03-12 | Stop reason: HOSPADM

## 2020-03-08 RX ORDER — GABAPENTIN 400 MG/1
400 CAPSULE ORAL
Status: DISCONTINUED | OUTPATIENT
Start: 2020-03-08 | End: 2020-03-12 | Stop reason: HOSPADM

## 2020-03-08 RX ORDER — POLYETHYLENE GLYCOL 3350 17 G/17G
17 POWDER, FOR SOLUTION ORAL DAILY PRN
Status: DISCONTINUED | OUTPATIENT
Start: 2020-03-08 | End: 2020-03-12 | Stop reason: HOSPADM

## 2020-03-08 RX ORDER — FAMOTIDINE 20 MG/1
20 TABLET, FILM COATED ORAL 2 TIMES DAILY
Status: DISCONTINUED | OUTPATIENT
Start: 2020-03-08 | End: 2020-03-12 | Stop reason: HOSPADM

## 2020-03-08 RX ORDER — NICOTINE POLACRILEX 4 MG
15 LOZENGE BUCCAL PRN
Status: DISCONTINUED | OUTPATIENT
Start: 2020-03-08 | End: 2020-03-12 | Stop reason: HOSPADM

## 2020-03-08 RX ORDER — GABAPENTIN 400 MG/1
800 CAPSULE ORAL NIGHTLY
Status: DISCONTINUED | OUTPATIENT
Start: 2020-03-08 | End: 2020-03-12 | Stop reason: HOSPADM

## 2020-03-08 RX ORDER — ONDANSETRON 2 MG/ML
4 INJECTION INTRAMUSCULAR; INTRAVENOUS EVERY 6 HOURS PRN
Status: DISCONTINUED | OUTPATIENT
Start: 2020-03-08 | End: 2020-03-08

## 2020-03-08 RX ORDER — SODIUM CHLORIDE 9 MG/ML
INJECTION, SOLUTION INTRAVENOUS CONTINUOUS
Status: DISCONTINUED | OUTPATIENT
Start: 2020-03-08 | End: 2020-03-10

## 2020-03-08 RX ORDER — SODIUM CHLORIDE 9 MG/ML
INJECTION, SOLUTION INTRAVENOUS CONTINUOUS
Status: DISCONTINUED | OUTPATIENT
Start: 2020-03-08 | End: 2020-03-08

## 2020-03-08 RX ORDER — DEXTROSE MONOHYDRATE 50 MG/ML
100 INJECTION, SOLUTION INTRAVENOUS PRN
Status: DISCONTINUED | OUTPATIENT
Start: 2020-03-08 | End: 2020-03-12 | Stop reason: HOSPADM

## 2020-03-08 RX ORDER — DONEPEZIL HYDROCHLORIDE 10 MG/1
10 TABLET, FILM COATED ORAL NIGHTLY
Status: DISCONTINUED | OUTPATIENT
Start: 2020-03-08 | End: 2020-03-12 | Stop reason: HOSPADM

## 2020-03-08 RX ORDER — TAMSULOSIN HYDROCHLORIDE 0.4 MG/1
0.4 CAPSULE ORAL NIGHTLY
Status: DISCONTINUED | OUTPATIENT
Start: 2020-03-08 | End: 2020-03-12 | Stop reason: HOSPADM

## 2020-03-08 RX ADMIN — TAMSULOSIN HYDROCHLORIDE 0.4 MG: 0.4 CAPSULE ORAL at 23:06

## 2020-03-08 RX ADMIN — SOTALOL HYDROCHLORIDE 40 MG: 80 TABLET ORAL at 23:07

## 2020-03-08 RX ADMIN — DULOXETINE HYDROCHLORIDE 60 MG: 30 CAPSULE, DELAYED RELEASE ORAL at 12:27

## 2020-03-08 RX ADMIN — FAMOTIDINE 20 MG: 20 TABLET ORAL at 12:27

## 2020-03-08 RX ADMIN — IOPAMIDOL 75 ML: 755 INJECTION, SOLUTION INTRAVENOUS at 09:38

## 2020-03-08 RX ADMIN — PROMETHAZINE HYDROCHLORIDE 12.5 MG: 25 TABLET ORAL at 23:07

## 2020-03-08 RX ADMIN — OXYCODONE HYDROCHLORIDE 7.5 MG: 5 TABLET ORAL at 12:26

## 2020-03-08 RX ADMIN — APIXABAN 2.5 MG: 2.5 TABLET, FILM COATED ORAL at 12:26

## 2020-03-08 RX ADMIN — FAMOTIDINE 20 MG: 20 TABLET ORAL at 23:07

## 2020-03-08 RX ADMIN — MEMANTINE 10 MG: 10 TABLET ORAL at 23:07

## 2020-03-08 RX ADMIN — SODIUM CHLORIDE 1000 ML: 9 INJECTION, SOLUTION INTRAVENOUS at 08:10

## 2020-03-08 RX ADMIN — GABAPENTIN 400 MG: 400 CAPSULE ORAL at 12:27

## 2020-03-08 RX ADMIN — GABAPENTIN 800 MG: 400 CAPSULE ORAL at 23:06

## 2020-03-08 RX ADMIN — SOTALOL HYDROCHLORIDE 40 MG: 80 TABLET ORAL at 12:27

## 2020-03-08 RX ADMIN — SODIUM CHLORIDE: 9 INJECTION, SOLUTION INTRAVENOUS at 09:54

## 2020-03-08 RX ADMIN — DONEPEZIL HYDROCHLORIDE 10 MG: 10 TABLET, FILM COATED ORAL at 23:07

## 2020-03-08 RX ADMIN — APIXABAN 2.5 MG: 2.5 TABLET, FILM COATED ORAL at 23:08

## 2020-03-08 RX ADMIN — SODIUM CHLORIDE: 9 INJECTION, SOLUTION INTRAVENOUS at 19:21

## 2020-03-08 RX ADMIN — ACETAMINOPHEN 650 MG: 325 TABLET ORAL at 12:27

## 2020-03-08 RX ADMIN — SODIUM CHLORIDE, PRESERVATIVE FREE 10 ML: 5 INJECTION INTRAVENOUS at 23:08

## 2020-03-08 RX ADMIN — SODIUM CHLORIDE: 9 INJECTION, SOLUTION INTRAVENOUS at 12:26

## 2020-03-08 ASSESSMENT — PAIN DESCRIPTION - ONSET: ONSET: ON-GOING

## 2020-03-08 ASSESSMENT — PAIN - FUNCTIONAL ASSESSMENT: PAIN_FUNCTIONAL_ASSESSMENT: PREVENTS OR INTERFERES SOME ACTIVE ACTIVITIES AND ADLS

## 2020-03-08 ASSESSMENT — PAIN SCALES - GENERAL
PAINLEVEL_OUTOF10: 0
PAINLEVEL_OUTOF10: 6
PAINLEVEL_OUTOF10: 0
PAINLEVEL_OUTOF10: 0
PAINLEVEL_OUTOF10: 7
PAINLEVEL_OUTOF10: 0

## 2020-03-08 ASSESSMENT — PAIN DESCRIPTION - FREQUENCY
FREQUENCY: CONTINUOUS
FREQUENCY: CONTINUOUS

## 2020-03-08 ASSESSMENT — PAIN DESCRIPTION - LOCATION
LOCATION: BACK
LOCATION: HIP

## 2020-03-08 ASSESSMENT — PAIN DESCRIPTION - PAIN TYPE: TYPE: ACUTE PAIN

## 2020-03-08 ASSESSMENT — PAIN DESCRIPTION - PROGRESSION: CLINICAL_PROGRESSION: NOT CHANGED

## 2020-03-08 ASSESSMENT — PAIN DESCRIPTION - DESCRIPTORS: DESCRIPTORS: CONSTANT;DISCOMFORT

## 2020-03-08 NOTE — PROGRESS NOTES
Two person skin check completed  by this nurse and verified by Lea Gitelman. Pt is free from skin injuries at this time.

## 2020-03-08 NOTE — H&P
[address] [place] [address], [address] [place] [address]                              HISTORY AND PHYSICAL    PATIENT NAME: Maureen Chirinos                     :        1935  MED REC NO:   [id number]                          ROOM:       [id number]  ACCOUNT NO:   [de-identified]                           ADMIT DATE: 2020  PROVIDER:     Nancy Gutierrez MD    CHIEF COMPLAINTS:  Generalized weakness, fatigue, and diarrhea. HISTORY OF PRESENT ILLNESS:  The patient is an 80-year-old gentleman  with multiple medical problems. He was brought to the emergency room  because of generalized weakness, fatigue, chills, diarrhea. The patient  went to the bathroom. He was into commode. He had diarrhea. Afterwards he was very weak and could not stand up. His wife called the  squad. He was brought to the emergency room. The patient has  underlying dementia. Most of the information was obtained via the  patient's wife who was present at the time of my examination in the  emergency room. The patient was seen and evaluated by Dr. Tonja Damian. His laboratory workup was significant for elevated lactic acid level at  3.8, white cell count is 12.2. There was no other evidence of any  infection. The patient has been admitted for further evaluation and  treatment. ALLERGIES:  TRAZODONE, DEMEROL, ERYTHROMYCIN, and ZITHROMAX. MEDICATIONS PRIOR TO ADMISSION:  Pepcid 20 mg twice a day, Prilosec 20  mg daily, Bentyl as needed, Eliquis 2.5 mg b.i.d., Cymbalta 60 mg daily,  Namenda 10 mg b.i.d., metformin 500 mg daily, oxycodone as needed,  Neurontin 400 mg at lunchtime and 800 mg at nighttime, Aricept 10 mg,  Lasix 40 mg, potassium 10 mEq, aspirin 81 mg, Flomax 0.4 mg, Betapace 40  mg twice a day, multivitamin tablet.     PAST MEDICAL AND SURGICAL HISTORY:  Hypertension, hyperlipidemia,  dementia, coronary artery disease, atrial fibrillation, arthritis,  lumbar spinal

## 2020-03-08 NOTE — ED PROVIDER NOTES
As physician-in-triage, I performed a medical screening history and physical exam on this patient. HISTORY OF PRESENT ILLNESS  Michael Chairez is a 80 y.o. male In brief, 61-year-old male with generalized fatigue and weakness. Patient had a fall from standing with no loss of consciousness this morning and was unable to get up because of diffuse weakness. He denies head trauma or loss of consciousness or seizure activity. He states he has been feeling poorly over the last several days with frequent episodes of watery diarrhea and loose stool. No fevers and chills. No chest pain or shortness of breath. No unilateral weakness vision changes or word finding difficulty. PHYSICAL EXAM  /69   Pulse 93   Temp 97.5 °F (36.4 °C) (Oral)   Resp 18   Ht 5' 10\" (1.778 m)   Wt 180 lb (81.6 kg)   SpO2 97%   BMI 25.83 kg/m²     On exam, the patient appears in no acute distress. Speech is clear. Breathing is unlabored. Moves all extremities. Cranial nerves are normal.  Upper extremity lower extremity strength is equal although he appears fatigued there is no asymmetry. Lungs are clear to auscultation with no wheeze or rales. Heart irregular rhythm    Septic work-up as well as cardiac work-up and generalized weak 80year-old gentleman with several day history of frequent watery diarrhea. No evidence of CVA or stroke. No stroke alert called. Head CT, chest x-ray, CBC CMP, EKG troponin, blood cultures, urine, urine culture, lactic, type and screen with a history of previous lower GI bleed all initiated    EKG compared to previous is unchanged. A. fib. Rate 97. Left axis deviation is present. . QTc 520. Right bundle branch block and left anterior fascicular block present. Poor R wave progression. No Q waves. No acute ST elevation or depression. No flipped T wave. No dynamic change. No evidence of ST elevation MI.       Comment: Please note this report has been produced using speech recognition software and may contain errors related to that system including errors in grammar, punctuation, and spelling, as well as words and phrases that may be inappropriate. If there are any questions or concerns please feel free to contact the dictating provider for clarification.       Talat Lopez MD  03/08/20 7474

## 2020-03-08 NOTE — ED NOTES
Wife at bedside and reports that pt attempted to use restroom about 2am this morning and fell. Pt supposed to use walker per wife and pt did not have walker this morning. Wife reports that pt has been increasingly forgetful and wife states \"I think he has dementia. \" wife states she called EMS d/t her not being able to assist him back to standing position. Wife reports pt was on floor for about 2 hours before she called EMS for assistance.       Gricelda Craft RN  03/08/20 2105 LUIS Penaloza RN  03/08/20 3615

## 2020-03-08 NOTE — ED PROVIDER NOTES
due to weakness. Has pain with ROM at the right hip but nontender on exam. Initial orders placed by Dr. Rowan Guzmán as triage physician. I added XR of right hip and knee that were negative. Labs had been sent, CT head ordered. 2788- I was called by lab that lactic acid is elevated at 3.8. Has mild elevation in white count- 12.8. no tahcycardia, no fevers. Has had some diarrhea. Meets 2/4 SIRS criteria but not sepsis criteria, no source of infection other than possible diarrhea at this time so no antibiotics ordered as most often viral cause of diarrhea and if E coli or C diff could be made worse with broad spectrum antibiotics, continuing fluids, plan for CT abdomen/pelvis and then reassess if needs antibiotics. Lactic acid may be elevated related to dehydration/fluid losses. Cr is stable, electrolytes appear to be stable, patient was taken for CT a/p.       3064- CT negative for acute infection or acute process. CXR was negative previously, labs otherwise appear to be unremarkable. Awaiting urinalysis. Plan for admission for lactic acidosis, weakness, suspect related to the diarrhea and fluid loss at this time. Total critical care time today provided was 38 minutes. This excludes seperately billable procedure. Critical care time provided for possible sepsis, near syncope, lactic acidosis that required close evaluation and/or intervention with concern for patient decompensation. Clinical Impression:  1. General weakness    2. Diarrhea, unspecified type    3. Lactic acidosis      Disposition referral (if applicable):  No follow-up provider specified.   Disposition medications (if applicable):  New Prescriptions    No medications on file     ED Provider Disposition Time  DISPOSITION Decision To Admit 03/08/2020 09:58:00 AM      Comment: Please note this report has been produced using speech recognition software and may contain errors related to that system including errors in grammar, punctuation, and

## 2020-03-08 NOTE — ED NOTES
Bed: ED-19  Expected date:   Expected time:   Means of arrival:   Comments:  Karrie Lara  03/08/20 0518

## 2020-03-09 PROBLEM — A41.9 SEPSIS (HCC): Status: RESOLVED | Noted: 2019-07-31 | Resolved: 2020-03-09

## 2020-03-09 PROBLEM — R41.82 ALTERED MENTAL STATUS: Status: RESOLVED | Noted: 2018-06-17 | Resolved: 2020-03-09

## 2020-03-09 LAB
ANION GAP SERPL CALCULATED.3IONS-SCNC: 9 MMOL/L (ref 4–16)
ANISOCYTOSIS: ABNORMAL
APTT: 31.6 SECONDS (ref 25.1–37.1)
BANDED NEUTROPHILS ABSOLUTE COUNT: 1.27 K/CU MM
BANDED NEUTROPHILS RELATIVE PERCENT: 14 % (ref 5–11)
BUN BLDV-MCNC: 15 MG/DL (ref 6–23)
CALCIUM SERPL-MCNC: 8.4 MG/DL (ref 8.3–10.6)
CHLORIDE BLD-SCNC: 102 MMOL/L (ref 99–110)
CO2: 26 MMOL/L (ref 21–32)
CREAT SERPL-MCNC: 1 MG/DL (ref 0.9–1.3)
DIFFERENTIAL TYPE: ABNORMAL
ESTIMATED AVERAGE GLUCOSE: 128 MG/DL
GFR AFRICAN AMERICAN: >60 ML/MIN/1.73M2
GFR NON-AFRICAN AMERICAN: >60 ML/MIN/1.73M2
GLUCOSE BLD-MCNC: 115 MG/DL (ref 70–99)
GLUCOSE BLD-MCNC: 123 MG/DL (ref 70–99)
GLUCOSE BLD-MCNC: 150 MG/DL (ref 70–99)
GLUCOSE BLD-MCNC: 174 MG/DL (ref 70–99)
HBA1C MFR BLD: 6.1 % (ref 4.2–6.3)
HCT VFR BLD CALC: 37.6 % (ref 42–52)
HEMOGLOBIN: 12.1 GM/DL (ref 13.5–18)
HYPOCHROMIA: ABNORMAL
LYMPHOCYTES ABSOLUTE: 2.3 K/CU MM
LYMPHOCYTES RELATIVE PERCENT: 25 % (ref 24–44)
MCH RBC QN AUTO: 32.5 PG (ref 27–31)
MCHC RBC AUTO-ENTMCNC: 32.2 % (ref 32–36)
MCV RBC AUTO: 101.1 FL (ref 78–100)
METAMYELOCYTES ABSOLUTE COUNT: 0.09 K/CU MM
METAMYELOCYTES PERCENT: 1 %
MONOCYTES ABSOLUTE: 0.2 K/CU MM
MONOCYTES RELATIVE PERCENT: 2 % (ref 0–4)
NUCLEATED RED BLOOD CELLS: 1
PDW BLD-RTO: 21.4 % (ref 11.7–14.9)
PLATELET # BLD: 235 K/CU MM (ref 140–440)
PLT MORPHOLOGY: ABNORMAL
PMV BLD AUTO: 9 FL (ref 7.5–11.1)
POTASSIUM SERPL-SCNC: 4.4 MMOL/L (ref 3.5–5.1)
RBC # BLD: 3.72 M/CU MM (ref 4.6–6.2)
SEGMENTED NEUTROPHILS ABSOLUTE COUNT: 5.2 K/CU MM
SEGMENTED NEUTROPHILS RELATIVE PERCENT: 58 % (ref 36–66)
SODIUM BLD-SCNC: 137 MMOL/L (ref 135–145)
WBC # BLD: 9.1 K/CU MM (ref 4–10.5)

## 2020-03-09 PROCEDURE — 80048 BASIC METABOLIC PNL TOTAL CA: CPT

## 2020-03-09 PROCEDURE — G0378 HOSPITAL OBSERVATION PER HR: HCPCS

## 2020-03-09 PROCEDURE — 83036 HEMOGLOBIN GLYCOSYLATED A1C: CPT

## 2020-03-09 PROCEDURE — 85730 THROMBOPLASTIN TIME PARTIAL: CPT

## 2020-03-09 PROCEDURE — 6370000000 HC RX 637 (ALT 250 FOR IP): Performed by: INTERNAL MEDICINE

## 2020-03-09 PROCEDURE — 82962 GLUCOSE BLOOD TEST: CPT

## 2020-03-09 PROCEDURE — 85007 BL SMEAR W/DIFF WBC COUNT: CPT

## 2020-03-09 PROCEDURE — 85027 COMPLETE CBC AUTOMATED: CPT

## 2020-03-09 PROCEDURE — 2580000003 HC RX 258: Performed by: INTERNAL MEDICINE

## 2020-03-09 PROCEDURE — 94761 N-INVAS EAR/PLS OXIMETRY MLT: CPT

## 2020-03-09 PROCEDURE — 1200000000 HC SEMI PRIVATE

## 2020-03-09 RX ADMIN — DULOXETINE HYDROCHLORIDE 60 MG: 30 CAPSULE, DELAYED RELEASE ORAL at 09:21

## 2020-03-09 RX ADMIN — SODIUM CHLORIDE: 9 INJECTION, SOLUTION INTRAVENOUS at 21:59

## 2020-03-09 RX ADMIN — FAMOTIDINE 20 MG: 20 TABLET ORAL at 20:42

## 2020-03-09 RX ADMIN — APIXABAN 2.5 MG: 2.5 TABLET, FILM COATED ORAL at 09:21

## 2020-03-09 RX ADMIN — MEMANTINE 10 MG: 10 TABLET ORAL at 20:42

## 2020-03-09 RX ADMIN — GABAPENTIN 800 MG: 400 CAPSULE ORAL at 20:42

## 2020-03-09 RX ADMIN — SOTALOL HYDROCHLORIDE 40 MG: 80 TABLET ORAL at 09:21

## 2020-03-09 RX ADMIN — DONEPEZIL HYDROCHLORIDE 10 MG: 10 TABLET, FILM COATED ORAL at 20:42

## 2020-03-09 RX ADMIN — APIXABAN 2.5 MG: 2.5 TABLET, FILM COATED ORAL at 20:42

## 2020-03-09 RX ADMIN — TAMSULOSIN HYDROCHLORIDE 0.4 MG: 0.4 CAPSULE ORAL at 20:42

## 2020-03-09 RX ADMIN — SODIUM CHLORIDE, PRESERVATIVE FREE 10 ML: 5 INJECTION INTRAVENOUS at 20:42

## 2020-03-09 RX ADMIN — FAMOTIDINE 20 MG: 20 TABLET ORAL at 09:21

## 2020-03-09 RX ADMIN — MEMANTINE 10 MG: 10 TABLET ORAL at 09:21

## 2020-03-09 RX ADMIN — SOTALOL HYDROCHLORIDE 40 MG: 80 TABLET ORAL at 20:43

## 2020-03-09 ASSESSMENT — PAIN SCALES - GENERAL
PAINLEVEL_OUTOF10: 0

## 2020-03-10 LAB
CLOSTRIDIUM DIFFICILE, PCR: NORMAL
CLOSTRIDIUM DIFFICILE, PCR: NORMAL
GLUCOSE BLD-MCNC: 114 MG/DL (ref 70–99)
GLUCOSE BLD-MCNC: 156 MG/DL (ref 70–99)
GLUCOSE BLD-MCNC: 162 MG/DL (ref 70–99)
GLUCOSE BLD-MCNC: 99 MG/DL (ref 70–99)
HEMOCCULT SP1 STL QL: NEGATIVE
ROTAVIRUS ANTIGEN: NEGATIVE

## 2020-03-10 PROCEDURE — 87046 STOOL CULTR AEROBIC BACT EA: CPT

## 2020-03-10 PROCEDURE — 97535 SELF CARE MNGMENT TRAINING: CPT

## 2020-03-10 PROCEDURE — 94761 N-INVAS EAR/PLS OXIMETRY MLT: CPT

## 2020-03-10 PROCEDURE — 6370000000 HC RX 637 (ALT 250 FOR IP): Performed by: INTERNAL MEDICINE

## 2020-03-10 PROCEDURE — 97166 OT EVAL MOD COMPLEX 45 MIN: CPT

## 2020-03-10 PROCEDURE — 2580000003 HC RX 258: Performed by: INTERNAL MEDICINE

## 2020-03-10 PROCEDURE — 82962 GLUCOSE BLOOD TEST: CPT

## 2020-03-10 PROCEDURE — 1200000000 HC SEMI PRIVATE

## 2020-03-10 PROCEDURE — 87324 CLOSTRIDIUM AG IA: CPT

## 2020-03-10 PROCEDURE — G0378 HOSPITAL OBSERVATION PER HR: HCPCS

## 2020-03-10 PROCEDURE — 97116 GAIT TRAINING THERAPY: CPT

## 2020-03-10 PROCEDURE — 6370000000 HC RX 637 (ALT 250 FOR IP): Performed by: GENERAL PRACTICE

## 2020-03-10 PROCEDURE — 87045 FECES CULTURE AEROBIC BACT: CPT

## 2020-03-10 PROCEDURE — G0328 FECAL BLOOD SCRN IMMUNOASSAY: HCPCS

## 2020-03-10 PROCEDURE — 97162 PT EVAL MOD COMPLEX 30 MIN: CPT

## 2020-03-10 PROCEDURE — 87425 ROTAVIRUS AG IA: CPT

## 2020-03-10 RX ORDER — CHOLESTYRAMINE LIGHT 4 G/5.7G
4 POWDER, FOR SUSPENSION ORAL 2 TIMES DAILY
Status: DISCONTINUED | OUTPATIENT
Start: 2020-03-10 | End: 2020-03-12 | Stop reason: HOSPADM

## 2020-03-10 RX ADMIN — INSULIN LISPRO 1 UNITS: 100 INJECTION, SOLUTION INTRAVENOUS; SUBCUTANEOUS at 13:05

## 2020-03-10 RX ADMIN — FAMOTIDINE 20 MG: 20 TABLET ORAL at 09:35

## 2020-03-10 RX ADMIN — SOTALOL HYDROCHLORIDE 40 MG: 80 TABLET ORAL at 09:35

## 2020-03-10 RX ADMIN — FAMOTIDINE 20 MG: 20 TABLET ORAL at 20:02

## 2020-03-10 RX ADMIN — APIXABAN 2.5 MG: 2.5 TABLET, FILM COATED ORAL at 20:01

## 2020-03-10 RX ADMIN — TAMSULOSIN HYDROCHLORIDE 0.4 MG: 0.4 CAPSULE ORAL at 20:02

## 2020-03-10 RX ADMIN — MEMANTINE 10 MG: 10 TABLET ORAL at 20:01

## 2020-03-10 RX ADMIN — DONEPEZIL HYDROCHLORIDE 10 MG: 10 TABLET, FILM COATED ORAL at 20:01

## 2020-03-10 RX ADMIN — SODIUM CHLORIDE: 9 INJECTION, SOLUTION INTRAVENOUS at 13:05

## 2020-03-10 RX ADMIN — CHOLESTYRAMINE 4 G: 4 POWDER, FOR SUSPENSION ORAL at 20:01

## 2020-03-10 RX ADMIN — SOTALOL HYDROCHLORIDE 40 MG: 80 TABLET ORAL at 20:02

## 2020-03-10 RX ADMIN — SODIUM CHLORIDE, PRESERVATIVE FREE 10 ML: 5 INJECTION INTRAVENOUS at 09:35

## 2020-03-10 RX ADMIN — DULOXETINE HYDROCHLORIDE 60 MG: 30 CAPSULE, DELAYED RELEASE ORAL at 09:34

## 2020-03-10 RX ADMIN — SODIUM CHLORIDE, PRESERVATIVE FREE 10 ML: 5 INJECTION INTRAVENOUS at 20:02

## 2020-03-10 RX ADMIN — GABAPENTIN 800 MG: 400 CAPSULE ORAL at 20:01

## 2020-03-10 RX ADMIN — GABAPENTIN 400 MG: 400 CAPSULE ORAL at 13:05

## 2020-03-10 RX ADMIN — MEMANTINE 10 MG: 10 TABLET ORAL at 09:35

## 2020-03-10 RX ADMIN — APIXABAN 2.5 MG: 2.5 TABLET, FILM COATED ORAL at 09:35

## 2020-03-10 ASSESSMENT — PAIN SCALES - GENERAL
PAINLEVEL_OUTOF10: 0
PAINLEVEL_OUTOF10: 0

## 2020-03-10 NOTE — CARE COORDINATION
3/10 Intern reviewed chart and initiated d/c planning. Pt's spouse was leaving as intern attempted to enter room, but wife said she could stay to talk. Pt was asleep during this time. Intern informed Pt of therapies recommendation to go to a SNF. Spouse said that if he had to, she would would prefer [facility] so that he can remain active with Dr. Mojgan Guerrier. Second choice is [facility]. Spouse addressed concerns that when he was in SNF in the past, they kept him longer than she expected and they got a large bill. She also stated that he would likely not agree to SNF. Intern told Pt she would attempt to talk to Pt later this afternoon when Pt is awake. When asked if I spouse went home would she be able to assist him, spouse mentioned how she observed Pt walk this morning and thought he did fine. Intern informed HC would also be another option if Pt and spouse were not in agreement. Pt had [address] in the past for therapy. Spouse mentioned this helped but when [facility]. stopped, the Pt fell. Spouse notes that Pt has dementia but was unclear about severity. She did disclose Pt has some hallucinations. Spouse answered a phone call and left at this point.  RB (intern)

## 2020-03-10 NOTE — PROGRESS NOTES
INTERNAL MEDICINE PROGRESS NOTE        Saranya Lucero   1935   Primary Care Physician:  Graciela Marr MD  Admit Date: 3/8/2020     Subjective:   Patient had four loose bowel movement in last 24 hours. He said that two overnight. C. Diff is not collected and because it was place 48 hours ago, it is . He denied any fever, chills. No nausea, vomiting. No urinary complaints. Objective:   BP (!) 149/72   Pulse 72   Temp 98.6 °F (37 °C) (Oral)   Resp 14   Ht 5' 10\" (1.778 m)   Wt 183 lb 8 oz (83.2 kg)   SpO2 97%   BMI 26.33 kg/m²    General appearance: alert, appears stated age and cooperative  Head: Normocephalic, without obvious abnormality, atraumatic  Neck: no adenopathy and supple, symmetrical, trachea midline  Lungs: clear to auscultation bilaterally  Heart: regular rate and rhythm and S1, S2 normal  Abdomen: soft, non-tender; bowel sounds normal; no masses,  no organomegaly  Extremities: no clubbing, cyanosis or edema  Neurologic:  Generalized weakness.      Data Review  Lab Results   Component Value Date     2020    K 4.4 2020     2020    CO2 26 2020    CREATININE 1.0 2020    BUN 15 2020    CALCIUM 8.4 2020     Lab Results   Component Value Date    WBC 9.1 2020    HGB 12.1 (L) 2020    HCT 37.6 (L) 2020    .1 (H) 2020     2020     INR/Prothrombin Time      Meds:    donepezil  10 mg Oral Nightly    DULoxetine  60 mg Oral Daily    apixaban  2.5 mg Oral BID    famotidine  20 mg Oral BID    gabapentin  400 mg Oral Lunch    gabapentin  800 mg Oral Nightly    memantine  10 mg Oral BID    sotalol  40 mg Oral BID    tamsulosin  0.4 mg Oral Nightly    sodium chloride flush  10 mL Intravenous 2 times per day    insulin lispro  0-6 Units Subcutaneous TID WC    insulin lispro  0-3 Units Subcutaneous Nightly     PRN Meds: oxyCODONE, sodium chloride flush, acetaminophen **OR** acetaminophen, polyethylene glycol, promethazine **OR** [DISCONTINUED] ondansetron, glucose, dextrose, glucagon (rDNA), dextrose    Assessment/Plan:   Patient Active Hospital Problem List:  Patient Active Problem List   Diagnosis    Essential hypertension, benign    Other and unspecified hyperlipidemia    Coronary atherosclerosis    [facility]    Neuropathy    Diarrhea   Diarrhea: waiting for c. Diff test   DM: continue monitoring blood sugar. hba1c is in control  Neuropathy: continue medications  Lactic acidosis: resolved  HTN: in control. CAD: stable. Plan:  Waiting for c. Diff testing  Start [facility]  Pt/ot  Ambulate as tolerated.

## 2020-03-10 NOTE — PROGRESS NOTES
Score : 16    Safety: patient left in chair with OT present and finishing treatment, call light within reach,  gait belt used. Assessment: Body structures, Functions, Activity limitations: Decreased safe awareness; Decreased functional mobility ; Decreased balance; Decreased endurance; Increased pain; Decreased ADL status; Decreased strength; Decreased cognition; Decreased sensation; Decreased posture  Pt is an 80year old male admitted with fatigue, weakness, and multiple falls. Recommend subacute rehab once medically stable. At baseline, he is Bertrand with gross mobility and most ADLs. He is currently requiring up to [facility] and is limited with activity tolerance. He is functioning below his typical baseline and would benefit from continued therapy to address his current deficits, dec potential fall risk, and restore PLOF. Complexity: Moderate  Prognosis: Good, no significant barriers to participation at this time. Plan Times per week: 4/week   Discharge Recommendations: Subacute/Skilled Nursing Facility  Equipment: continue to assess at next level of care     Goals:  Short term goals  Time Frame for Short term goals: 1 week   Short term goal 1: Pt will perform sit><supine Bertrand   Short term goal 2: Pt will transfer to bed/recliner SBA   Short term goal 3: Pt will ambulate 100ft with RW SBA   Short term goal 4: Pt will ascend/descend 1 step with RW SBA      Treatment plan:  Strengthening; Balance Training; Transfer Training; Functional Mobility Training; ADL/Self-care Training; ROM; Gait Training; Endurance Training; Wheelchair Mobility Training; Stair training; Neuromuscular Re-education; Home Exercise Program; Patient/Caregiver Education & Training; Modalities; Positioning; Equipment Evaluation, Education, & procurement;  Safety Education & Training    Recommendations for NURSING mobility: ambulate to bathroom with RW     Time:   Time in: 0830  Time out: 0855  Timed treatment minutes: 12  Total time:

## 2020-03-10 NOTE — PROGRESS NOTES
Retired  Type of occupation:    Additional Comments: 3 falls in the last month     Examination of body systems (includes body structures/functions, activity/participation limitations):  · Observation:  Supine in bed upon arrival, wife in room, agreeable to therapy  · Vision:  Glasses  · Hearing:  [place]  · Cardiopulmonary:  No 02 needs      Body Systems and functions:  · ROM R/L:  WFL. · Strength R/L:  4/5,   · Sensation: Neuropathy in bilateral feet/hands  · Tone: Normal  · Coordination: WFL  · Perception: Decreased initiation/sequencing requiring min VCs to overcome    Activities of Daily Living (ADLs):  · Feeding: Setup/SBA (pt feeding in middle of session sitting upright in reclining chair, assistance to problem solve opening containers. Pt attempted to drink orange juice w/o opening container. Pt did not know how to place butter on pancakes w/o VC. One pt was given assistance pt able to continue action w/ min VCs to overcome decreased perception)  · Grooming:  SBA (oral care sitting upright in chair and washing face/hands, SBA due to decreased perception)  · UB bathing: SBA (washing abdomen, trunk, BUE arms sitting upright in chair)  · LB bathing: modA (assistance to wash carmelita area/buttocks in stand due to decreased functional activity tolerance and decreased dynamic standing balance, able to wash upper/lower legs in sit)  · UB dressing: SBA (doffing/donning gown sitting upright in chair)  · LB dressing: modA (doffing soiled depends  In stand, pt able to thread over toes up to knees, assistance to thread over buttocks in stand)  · Toileting: modA (pt soiled depends, see LB bathing/dressing for details)    Cognitive and Psychosocial Functioning:  · Overall cognitive status: Follows one step commands w/ repetition, AxO to self only. · Affect: Confused but pleasant        Mobility:  · Supine to sit:  Ernestina  · Transfers: Ernestina from EOB, modA from reclining chair  · Sitting balance:  CGA.     · Standing balance:  CGA w/ RW.  · Functional Mobility: CGA w/ RW (See PT notes for gait details)  · Toilet/Shower Transfers: DNT             AM-PAC Daily Activity Inpatient   How much help for putting on and taking off regular lower body clothing?: A Lot  How much help for Bathing?: A Lot  How much help for Toileting?: A Lot  How much help for putting on and taking off regular upper body clothing?: A Little  How much help for taking care of personal grooming?: A Little  How much help for eating meals?: A Little  AM-PAC Inpatient Daily Activity Raw Score: 15  AM-PAC Inpatient ADL T-Scale Score : 34.69  ADL Inpatient CMS 0-100% Score: 56.46  ADL Inpatient CMS G-Code Modifier : CK    Treatment:  Self Care Training:   Cues were given for safety, sequence, UE/LE placement, visual cues, and balance. Activities performed today included UB/LB bathing/dressing, grooming, toileting    Safety: patient left in chair with chair alarm, call light within reach, RN notified, gait belt used. Assessment:  Pt is a 81 yo male admitted from home for diarrhea. Pt at baseline needs assistance for ADLs needs assistance for high level IADLs and is Independent for functional transfers/mobility w/ AD. Pt currently presents w/ deficits in ADL and high level IADL independence, functional activity tolerance, dynamic sitting and standing balance and tolerance and functional transfers, BUE strength, initiation/sequencing and cognition Pt would benefit from continued acute care OT services w/ discharge to SNF  Complexity: Moderate  Prognosis: Good, no significant barriers to participation at this time.    Plan  Times per week: 2x+  Times per day: Daily  Current Treatment Recommendations: Strengthening, Pain Management, Safety Education & Training, Balance Training, Patient/Caregiver Education & Training, Self-Care / ADL, Functional Mobility Training, Equipment Evaluation, Education, & procurement, Home Management Training, Cognitive Reorientation, Endurance Training, Cognitive/Perceptual Training, ROM     Equipment: defer    Goals:  Pt goal: go home  Time Frame for STGs: discharge  Goal 1: Pt will perform UE ADLs Independent  Goal 2: Pt will perform LE ADLs Bertrand   Goal 3: Pt will perform toileting Bertrand  Goal 4: Pt will perform functional transfer w/ AD Bertrand  Goal 5: Pt will perform functional mobility w/ AD Bertrand  Goal 6: Pt will perform therex/theract in order to increase functional activity tolerance and dynamic standing balance    Treatment plan:  Pt will perform functional task in stand reaching in all 3 planes in order to increase dynamic standing balance and functional activity tolerance    Recommendations for NURSING activity: Up to chair for all 3 meals and up to standard commode for all toileting needs    Time:   Time in: 0831  Time out: 0929  Timed treatment minutes: 53 minutes  Total time: 58 minutes    Electronically signed by:  Agnes HELLER/L [id number]  9:45 AM,3/10/2020

## 2020-03-11 LAB
GLUCOSE BLD-MCNC: 112 MG/DL (ref 70–99)
GLUCOSE BLD-MCNC: 122 MG/DL (ref 70–99)
GLUCOSE BLD-MCNC: 279 MG/DL (ref 70–99)
GLUCOSE BLD-MCNC: 85 MG/DL (ref 70–99)

## 2020-03-11 PROCEDURE — G0378 HOSPITAL OBSERVATION PER HR: HCPCS

## 2020-03-11 PROCEDURE — 6370000000 HC RX 637 (ALT 250 FOR IP): Performed by: INTERNAL MEDICINE

## 2020-03-11 PROCEDURE — 94761 N-INVAS EAR/PLS OXIMETRY MLT: CPT

## 2020-03-11 PROCEDURE — 2580000003 HC RX 258: Performed by: INTERNAL MEDICINE

## 2020-03-11 PROCEDURE — 6370000000 HC RX 637 (ALT 250 FOR IP): Performed by: GENERAL PRACTICE

## 2020-03-11 PROCEDURE — 82962 GLUCOSE BLOOD TEST: CPT

## 2020-03-11 PROCEDURE — 1200000000 HC SEMI PRIVATE

## 2020-03-11 RX ORDER — OXYCODONE HYDROCHLORIDE 5 MG/1
7.5 TABLET ORAL 3 TIMES DAILY PRN
Qty: 12 TABLET | Refills: 0 | Status: SHIPPED | OUTPATIENT
Start: 2020-03-11 | End: 2020-03-14

## 2020-03-11 RX ORDER — CHOLESTYRAMINE LIGHT 4 G/5.7G
4 POWDER, FOR SUSPENSION ORAL 2 TIMES DAILY
Qty: 60 PACKET | Refills: 3 | Status: SHIPPED | OUTPATIENT
Start: 2020-03-11

## 2020-03-11 RX ADMIN — SOTALOL HYDROCHLORIDE 40 MG: 80 TABLET ORAL at 22:08

## 2020-03-11 RX ADMIN — GABAPENTIN 800 MG: 400 CAPSULE ORAL at 22:08

## 2020-03-11 RX ADMIN — SODIUM CHLORIDE, PRESERVATIVE FREE 10 ML: 5 INJECTION INTRAVENOUS at 22:09

## 2020-03-11 RX ADMIN — GABAPENTIN 400 MG: 400 CAPSULE ORAL at 12:46

## 2020-03-11 RX ADMIN — INSULIN LISPRO 3 UNITS: 100 INJECTION, SOLUTION INTRAVENOUS; SUBCUTANEOUS at 12:46

## 2020-03-11 RX ADMIN — SODIUM CHLORIDE, PRESERVATIVE FREE 10 ML: 5 INJECTION INTRAVENOUS at 09:29

## 2020-03-11 RX ADMIN — FAMOTIDINE 20 MG: 20 TABLET ORAL at 22:07

## 2020-03-11 RX ADMIN — SOTALOL HYDROCHLORIDE 40 MG: 80 TABLET ORAL at 09:29

## 2020-03-11 RX ADMIN — FAMOTIDINE 20 MG: 20 TABLET ORAL at 09:29

## 2020-03-11 RX ADMIN — DULOXETINE HYDROCHLORIDE 60 MG: 30 CAPSULE, DELAYED RELEASE ORAL at 09:29

## 2020-03-11 RX ADMIN — DONEPEZIL HYDROCHLORIDE 10 MG: 10 TABLET, FILM COATED ORAL at 22:09

## 2020-03-11 RX ADMIN — CHOLESTYRAMINE 4 G: 4 POWDER, FOR SUSPENSION ORAL at 09:29

## 2020-03-11 RX ADMIN — TAMSULOSIN HYDROCHLORIDE 0.4 MG: 0.4 CAPSULE ORAL at 22:07

## 2020-03-11 RX ADMIN — APIXABAN 2.5 MG: 2.5 TABLET, FILM COATED ORAL at 09:29

## 2020-03-11 RX ADMIN — MEMANTINE 10 MG: 10 TABLET ORAL at 09:29

## 2020-03-11 RX ADMIN — CHOLESTYRAMINE 4 G: 4 POWDER, FOR SUSPENSION ORAL at 22:09

## 2020-03-11 RX ADMIN — APIXABAN 2.5 MG: 2.5 TABLET, FILM COATED ORAL at 22:08

## 2020-03-11 RX ADMIN — MEMANTINE 10 MG: 10 TABLET ORAL at 22:09

## 2020-03-11 ASSESSMENT — PAIN SCALES - GENERAL
PAINLEVEL_OUTOF10: 0
PAINLEVEL_OUTOF10: 0

## 2020-03-11 NOTE — PROGRESS NOTES
INTERNAL MEDICINE PROGRESS NOTE        Noemi Denver   1935   Primary Care Physician:  Emmett Draper MD  Admit Date: 3/8/2020     Subjective:   Patient was started on questran powder. He is feeling lot better. No new issues. No headache or dizziness. No nausea, vomiting. Diarrhea improved No urinary complaints. Objective:   /61   Pulse 80   Temp 97.2 °F (36.2 °C) (Oral)   Resp 18   Ht 5' 10\" (1.778 m)   Wt 183 lb 1.6 oz (83.1 kg)   SpO2 99%   BMI 26.27 kg/m²    General appearance: alert, appears stated age and cooperative  Head: Normocephalic, without obvious abnormality, atraumatic  Neck: no adenopathy and supple, symmetrical, trachea midline  Lungs: clear to auscultation bilaterally  Heart: regular rate and rhythm and S1, S2 normal  Abdomen: soft, non-tender; bowel sounds normal; no masses,  no organomegaly  Extremities: no clubbing, cyanosis or edema  Neurologic:  Generalized weakness.   No focal signs    Data Review  Lab Results   Component Value Date     03/09/2020    K 4.4 03/09/2020     03/09/2020    CO2 26 03/09/2020    CREATININE 1.0 03/09/2020    BUN 15 03/09/2020    CALCIUM 8.4 03/09/2020     Lab Results   Component Value Date    WBC 9.1 03/09/2020    HGB 12.1 (L) 03/09/2020    HCT 37.6 (L) 03/09/2020    .1 (H) 03/09/2020     03/09/2020     INR/Prothrombin Time      Meds:    cholestyramine light  4 g Oral BID    donepezil  10 mg Oral Nightly    DULoxetine  60 mg Oral Daily    apixaban  2.5 mg Oral BID    famotidine  20 mg Oral BID    gabapentin  400 mg Oral Lunch    gabapentin  800 mg Oral Nightly    memantine  10 mg Oral BID    sotalol  40 mg Oral BID    tamsulosin  0.4 mg Oral Nightly    sodium chloride flush  10 mL Intravenous 2 times per day    insulin lispro  0-6 Units Subcutaneous TID WC    insulin lispro  0-3 Units Subcutaneous Nightly     PRN Meds: oxyCODONE, sodium chloride flush, acetaminophen **OR** acetaminophen, polyethylene glycol, promethazine **OR** [DISCONTINUED] ondansetron, glucose, dextrose, glucagon (rDNA), dextrose    Assessment/Plan:   Patient Active Hospital Problem List:  Patient Active Problem List   Diagnosis    Essential hypertension, benign    Other and unspecified hyperlipidemia    Coronary atherosclerosis    [facility]    Neuropathy    Diarrhea   Diarrhea: c. Diff negative  DM: blood sugars are in reasonable range  Neuropathy: continue medications  HTN: in control. CAD: stable. Plan:  Pt/ot recommends ECF  Case management consulted, working for discharge planning.

## 2020-03-11 NOTE — PROGRESS NOTES
Physical Therapy  Attempted to see pt for PT treatment. Pt just got back to bed after sitting up most of the day. Wife stated his back was bothersome and wanted to lie down. Will check back on pt as time permits.

## 2020-03-11 NOTE — DISCHARGE INSTR - COC
Continuity of Care Form    Patient Name: Lonnie Caba   :  1935  MRN:  [id number]    Admit date:  3/8/2020  Discharge date:  3/12/2020    Code Status Order: Full Code   Advance Directives:   [facility] Documentation     Date/Time Healthcare Directive Type of Healthcare Directive Copy in [facility] [address] Agent's Name Healthcare Agent's Phone Number    [phone]  Yes, patient has an advance directive for healthcare treatment  Durable power of  for health care  No, copy requested from family  Spouse  --  --          Admitting Physician:  Stephanie Siddiqui MD  PCP: Maximilian Morgan MD    Discharging Nurse: Valerie Sorenson Unit/Room#: 5346/8597-N  Discharging Unit Phone Number: [phone]    Emergency Contact:   Extended Emergency Contact Information  Primary Emergency Contact: Charlotte Mackenzie  Address: [address] [place] [address], [address] Phone: [phone]  Relation: Spouse  Secondary Emergency Contact: [address] [place] [address] Phone: [phone]  Relation: Child    Past Surgical History:  Past Surgical History:   Procedure Laterality Date    APPENDECTOMY      BACK SURGERY  Last done 2007    x 2    CHOLECYSTECTOMY  12/15/14    laparoscopic    COLONOSCOPY      MITRAL VALVE SURGERY      OTHER SURGICAL HISTORY  2013    lap appy    SKIN GRAFT  1950's    s/p gresham    TONSILLECTOMY  15years old       Immunization History:   Immunization History   Administered Date(s) Administered    Influenza Virus Vaccine 2012    Pneumococcal Polysaccharide ([id number]) 2012       Active Problems:  Patient Active Problem List   Diagnosis Code    Essential hypertension, benign I10    Other and unspecified hyperlipidemia E78.5    Coronary atherosclerosis I25.10    Type 2 diabetes mellitus without complication ([facility] T26.2    Neuropathy G62.9    Diarrhea R19.7       Isolation/Infection: Isolation          No Isolation        Patient Infection Status     Infection Onset Added Last Indicated Last Indicated By Review Planned Expiration Resolved Resolved By    None active    Resolved    C-diff Rule Out 03/08/20 03/08/20 03/10/20 C difficile Molecular/PCR (Ordered)   03/10/20 Rule-Out Test Resulted          Nurse Assessment:  Last Vital Signs: /61   Pulse 80   Temp 97.2 °F (36.2 °C) (Oral)   Resp 18   Ht 5' 10\" (1.778 m)   Wt 183 lb 1.6 oz (83.1 kg)   SpO2 99%   BMI 26.27 kg/m²     Last documented pain score (0-10 scale): Pain Level: 0  Last Weight:   Wt Readings from Last 1 Encounters:   03/11/20 183 lb 1.6 oz (83.1 kg)     Mental Status:  oriented and alert    IV Access:  - None    Nursing Mobility/ADLs:  Walking   Assisted  Transfer  Assisted  Bathing  Assisted  Dressing  Assisted  Toileting  Assisted  Feeding  Assisted  Med Admin  Assisted  Med Delivery   whole    Wound Care Documentation and Therapy:        Elimination:  Continence:   · Bowel: No  · Bladder: No  Urinary Catheter: None   Colostomy/Ileostomy/Ileal Conduit: No       Date of Last BM: 3/11/2020    Intake/Output Summary (Last 24 hours) at 3/11/2020 1138  Last data filed at 3/11/2020 0926  Gross per 24 hour   Intake 360 ml   Output 100 ml   Net 260 ml     I/O last 3 completed shifts:   In: 10 [I.V.:10]  Out: -     Safety Concerns:     None    Impairments/Disabilities:      None      Patient's personal belongings (please select all that are sent with patient):  None    RN SIGNATURE:  Electronically signed by Bladimir Pina LPN on 8/17/66 at 2:62 PM EDT    CASE MANAGEMENT/SOCIAL WORK SECTION    Inpatient Status Date: ***    Readmission Risk Assessment Score:  Readmission Risk              Risk of Unplanned Readmission:        18           Discharging to Facility/ Agency   · Name:   · Address:  · Phone:  · Fax:    Dialysis Facility (if applicable)   · Name:  · Address:  · Dialysis Schedule:  · Phone:  · Fax:    Case Manager/ signature: {Esignature:[id number]}    PHYSICIAN SECTION    Prognosis: Good    Condition at Discharge: Stable    Rehab Potential (if transferring to Rehab): Good    Recommended Labs or Other Treatments After Discharge:   Bmp, cbc in one week. Physician Certification: I certify the above information and transfer of Eddye Smoke  is necessary for the continuing treatment of the diagnosis listed and that he requires [place] for less 30 days.      UpdaNutrition Therapy:  Current Nutrition Therapy:   - Oral Diet:  Carb Control 4 carbs/meal (1800kcals/day)    Routes of Feeding: Oral  Liquids: No Restrictions  Daily Fluid Restriction: no  Last Modified Barium Swallow with Video (Video Swallowing Test): not done    Treatments at the Time of Hospital Discharge:   Respiratory Treatments: none      Rehab Therapies: Physical Therapy and Occupational Therapy  Weight Bearing Status/Restrictions: No weight bearing restirctions  Other Medical Equipment (for information only, NOT a DME order):  walker  Other Treatments:   te Admission H&P: No change in H&P    PHYSICIAN SIGNATURE:  Electronically signed by Sheyla Sheppard MD on 3/12/20 at 7:52 AM EDT

## 2020-03-11 NOTE — CARE COORDINATION
F/u with Pt spouse in the room. She would like for the Pt to go to [place]. Called the referral into [facility].      Electronically signed by MARIELENA Camejo on 3/11/2020 at 11:43 AM

## 2020-03-12 VITALS
DIASTOLIC BLOOD PRESSURE: 69 MMHG | HEIGHT: 70 IN | OXYGEN SATURATION: 97 % | TEMPERATURE: 98.1 F | HEART RATE: 73 BPM | BODY MASS INDEX: 26.21 KG/M2 | SYSTOLIC BLOOD PRESSURE: 127 MMHG | WEIGHT: 183.1 LBS | RESPIRATION RATE: 16 BRPM

## 2020-03-12 LAB
CULTURE: NORMAL
GLUCOSE BLD-MCNC: 114 MG/DL (ref 70–99)
GLUCOSE BLD-MCNC: 117 MG/DL (ref 70–99)
GLUCOSE BLD-MCNC: 239 MG/DL (ref 70–99)
Lab: NORMAL
SPECIMEN: NORMAL

## 2020-03-12 PROCEDURE — 6370000000 HC RX 637 (ALT 250 FOR IP): Performed by: INTERNAL MEDICINE

## 2020-03-12 PROCEDURE — G0378 HOSPITAL OBSERVATION PER HR: HCPCS

## 2020-03-12 PROCEDURE — 82962 GLUCOSE BLOOD TEST: CPT

## 2020-03-12 PROCEDURE — 6370000000 HC RX 637 (ALT 250 FOR IP): Performed by: GENERAL PRACTICE

## 2020-03-12 PROCEDURE — 97535 SELF CARE MNGMENT TRAINING: CPT

## 2020-03-12 PROCEDURE — 2580000003 HC RX 258: Performed by: INTERNAL MEDICINE

## 2020-03-12 RX ADMIN — GABAPENTIN 400 MG: 400 CAPSULE ORAL at 13:19

## 2020-03-12 RX ADMIN — DULOXETINE HYDROCHLORIDE 60 MG: 30 CAPSULE, DELAYED RELEASE ORAL at 09:42

## 2020-03-12 RX ADMIN — MEMANTINE 10 MG: 10 TABLET ORAL at 09:43

## 2020-03-12 RX ADMIN — FAMOTIDINE 20 MG: 20 TABLET ORAL at 09:42

## 2020-03-12 RX ADMIN — SOTALOL HYDROCHLORIDE 40 MG: 80 TABLET ORAL at 09:43

## 2020-03-12 RX ADMIN — APIXABAN 2.5 MG: 2.5 TABLET, FILM COATED ORAL at 09:42

## 2020-03-12 RX ADMIN — INSULIN LISPRO 2 UNITS: 100 INJECTION, SOLUTION INTRAVENOUS; SUBCUTANEOUS at 13:19

## 2020-03-12 RX ADMIN — CHOLESTYRAMINE 4 G: 4 POWDER, FOR SUSPENSION ORAL at 09:42

## 2020-03-12 RX ADMIN — SODIUM CHLORIDE, PRESERVATIVE FREE 10 ML: 5 INJECTION INTRAVENOUS at 09:43

## 2020-03-12 ASSESSMENT — PAIN SCALES - GENERAL
PAINLEVEL_OUTOF10: 0
PAINLEVEL_OUTOF10: 0

## 2020-03-12 NOTE — CARE COORDINATION
LSW noted Pt has a discharge for today. Transportation arranged by [facility] [employer] [employer]. They have called [facility] and have scheduled a 6:30pm  time. Packet has been prepared. PassR completed.

## 2020-03-12 NOTE — DISCHARGE SUMMARY
Kervin Sanchez MD, Internal Medicine    [facility]   [phone]   Patient ID  Lonnie Caba   1935  [id number]          Admit date: 3/8/2020   Discharge date: 3/12/2020      Admitting Physician: Stephanie Siddiqui MD   Discharge Physician: Edgar Mcgowan PA-C    Discharge Diagnoses:   Diarrhea: improved. C. diff negative. Stool studies negative. Continue questran as needed. DM: a1c is 6.1%. Neuropathy: continue medications  HTN: in control. CAD: stable. Weakness: PT/OT at SNF. Patient Active Problem List   Diagnosis    Essential hypertension, benign    Other and unspecified hyperlipidemia    Coronary atherosclerosis    Type [facility] mellitus [facility] ([facility]    Neuropathy    Diarrhea       Discharged Condition: good    Hospital Course: Patient is an 79 yo male who presents to ER with weakness, fatigue, and diarrhea. The patient has history of dementia, HTN, HLD, DM, CAD, afib. The patient was very weak, had chills, and diarrhea according to his wife. In the ER, his lactate was elevated at 3.8 and WBC was 12.2. CXR was negative. EKG showed afib with normal rate. C diff testing stool culture, occult blood, rotavirus  were all negative. WBC and lactate have both returned to normal limits. He was given Sallie and his diarrhea and fatigue have improved. The patient is feeling better. Due to his underlying weakness, he will be transferred to nursing facility. He is to follow up with PCP as outpatient.     Relevant Investigations  See HPI    Disposition: SNF    Patient Instructions:    Lakisha Gonzalez   Home Medication Instructions [facility]:[id number]    Printed on:03/12/20 1309   Medication Information                      aspirin 81 MG chewable tablet  Take 1 tablet by mouth daily             cholestyramine light 4 g packet  Take 1 packet by mouth 2 times daily             dicyclomine (BENTYL) 10 MG capsule  Take 1 capsule by mouth 3 times daily As needed for abdominal pain             donepezil (ARICEPT) 10 MG tablet  Take 10 mg by mouth nightly              DULoxetine (CYMBALTA) 60 MG extended release capsule  Take 60 mg by mouth daily              ELIQUIS 5 MG TABS tablet  Take 2.5 mg by mouth 2 times daily              famotidine (PEPCID) 20 MG tablet  Take 1 tablet by mouth 2 times daily             furosemide (LASIX) 40 MG tablet  Take 40 mg by mouth every morning              gabapentin (NEURONTIN) 400 MG capsule  Take 400 mg by mouth Daily with lunch.             gabapentin (NEURONTIN) 400 MG capsule  Take 800 mg by mouth nightly. memantine (NAMENDA) 10 MG tablet  Take 10 mg by mouth 2 times daily             metFORMIN (GLUCOPHAGE-XR) 500 MG extended release tablet  Take 500 mg by mouth daily             Multiple Vitamin (MULTIVITAMIN PO)  Take 1 tablet by mouth daily. omeprazole (PRILOSEC) 20 MG delayed release capsule  Take 1 capsule by mouth 2 times daily (before meals)             oxyCODONE (ROXICODONE) 5 MG immediate release tablet  Take 1.5 tablets by mouth 3 times daily as needed for Pain for up to 3 days. potassium chloride (KLOR-CON M) 10 MEQ extended release tablet  Take 10 mEq by mouth every morning             sotalol (BETAPACE) 80 MG tablet  Take 40 mg by mouth 2 times daily. tamsulosin (FLOMAX) 0.4 MG capsule  Take 0.4 mg by mouth nightly                     Activity: activity as tolerated  Diet: diabetic diet    Follow-up with Dr. Alexey Bai in 5 days after discharge from [facility]. Signed: Electronically signed by Ashely Weiner PA-C on 3/12/2020 at 12:39 PM    Time spent on discharge 35 minutes    Patient is seen and examined this am. He is doing lot better. Pt will be discharged to [facility] this evening. Discussed with patient and family.  Patient was seen indepedently and management plan was developed mutually with KYLE Nickerson

## 2020-03-12 NOTE — PROGRESS NOTES
Occupational Therapy  . Occupational Therapy Treatment Note  Name: Yane Curran MRN: [id number] :   1935   Date:  3/12/2020   Admission Date: 3/8/2020 Room:  [facility]     Restrictions/Precautions:    General precautions, Fall risk    Communication with other providers:  Nurse Carney Hashimoto cleared for tx. Subjective:  Patient states: \"Im cold, so I do not want to work today. \"  Pain:   Location, Type, Intensity (0/10 to 10/10):  No rating, but stated his knees hurt    Objective:    Observation:  Patient seated in recliner, wife present. Patient initially stating he did not want therapy, after explanation of OT, wife stated he had to do oral care. Once wife left room patient refusing other ADLs. Objective Measures:  Telemetry. Treatment, including education:    ADL activity training was instructed today. Cues were given for safety, sequence, UE/LE placement, visual cues, and balance. Activities performed today included   hygiene, and grooming. Oral care- SUP while seated in recliner  Facial hygiene- SUP  Declined all other ADLs, OOB activities. Patient educated on role of OT , benefits of OT and rationale for therapeutic intervention. All therapeutic intervention performed c emphasis on BUE strengthening and endurance to  increase strength for functional tasks / transfers. Patient left safely in bedside chair at end of session, with call light in reach, alarm on and nursing aware. Gait belt was used for func transfers / mobility.     Assessment / Impression:        Patient's tolerance of treatment:  fair  Adverse Reaction: none  Significant change in status and impact:  none  Barriers to improvement:  Self limiting behavior    Plan for Next Session:    Continue with OT POC  Time in:  1107  Time out:  1120  Timed treatment minutes:  13  Total treatment time:  13  Electronically signed by:    Bea Huynh  3/12/2020, 11:07 AM    Previously filed values:

## 2020-03-12 NOTE — PROGRESS NOTES
Report called to Estee Haile at [place]. Questions answered with no other concerns. Med Trans to p/u at 6:30.

## 2020-03-13 ENCOUNTER — HOSPITAL ENCOUNTER (OUTPATIENT)
Age: 85
Setting detail: SPECIMEN
Discharge: HOME | End: 2020-03-13
Payer: COMMERCIAL

## 2020-03-13 LAB
ALBUMIN SERPL-MCNC: 3.5 GM/DL (ref 3.4–5)
ALP BLD-CCNC: 148 IU/L (ref 40–128)
ALT SERPL-CCNC: 13 U/L (ref 10–40)
ANION GAP SERPL CALCULATED.3IONS-SCNC: 14 MMOL/L (ref 4–16)
AST SERPL-CCNC: 26 IU/L (ref 15–37)
BILIRUB SERPL-MCNC: 1.3 MG/DL (ref 0–1)
BUN BLDV-MCNC: 16 MG/DL (ref 6–23)
CALCIUM SERPL-MCNC: 8.6 MG/DL (ref 8.3–10.6)
CHLORIDE BLD-SCNC: 105 MMOL/L (ref 99–110)
CO2: 16 MMOL/L (ref 21–32)
CREAT SERPL-MCNC: 0.9 MG/DL (ref 0.9–1.3)
CULTURE: NORMAL
CULTURE: NORMAL
GFR AFRICAN AMERICAN: >60 ML/MIN/1.73M2
GFR NON-AFRICAN AMERICAN: >60 ML/MIN/1.73M2
GLUCOSE BLD-MCNC: 90 MG/DL (ref 70–99)
Lab: NORMAL
Lab: NORMAL
POTASSIUM SERPL-SCNC: 4.9 MMOL/L (ref 3.5–5.1)
SODIUM BLD-SCNC: 135 MMOL/L (ref 135–145)
SPECIMEN: NORMAL
SPECIMEN: NORMAL
TOTAL PROTEIN: 5.7 GM/DL (ref 6.4–8.2)

## 2020-03-13 PROCEDURE — 80053 COMPREHEN METABOLIC PANEL: CPT

## 2020-03-13 PROCEDURE — 36415 COLL VENOUS BLD VENIPUNCTURE: CPT

## 2020-03-19 ENCOUNTER — HOSPITAL ENCOUNTER (OUTPATIENT)
Age: 85
Setting detail: SPECIMEN
Discharge: HOME | End: 2020-03-19

## 2020-03-19 LAB
ANION GAP SERPL CALCULATED.3IONS-SCNC: 11 MMOL/L (ref 4–16)
BUN BLDV-MCNC: 20 MG/DL (ref 6–23)
CALCIUM SERPL-MCNC: 8.9 MG/DL (ref 8.3–10.6)
CHLORIDE BLD-SCNC: 102 MMOL/L (ref 99–110)
CO2: 25 MMOL/L (ref 21–32)
CREAT SERPL-MCNC: 1.2 MG/DL (ref 0.9–1.3)
GFR AFRICAN AMERICAN: >60 ML/MIN/1.73M2
GFR NON-AFRICAN AMERICAN: 58 ML/MIN/1.73M2
GLUCOSE BLD-MCNC: 104 MG/DL (ref 70–99)
HCT VFR BLD CALC: 38.4 % (ref 42–52)
HEMOGLOBIN: 12.5 GM/DL (ref 13.5–18)
MCH RBC QN AUTO: 32.6 PG (ref 27–31)
MCHC RBC AUTO-ENTMCNC: 32.6 % (ref 32–36)
MCV RBC AUTO: 100 FL (ref 78–100)
PDW BLD-RTO: 20.7 % (ref 11.7–14.9)
PLATELET # BLD: 190 K/CU MM (ref 140–440)
PMV BLD AUTO: 9.8 FL (ref 7.5–11.1)
POTASSIUM SERPL-SCNC: 4.5 MMOL/L (ref 3.5–5.1)
RBC # BLD: 3.84 M/CU MM (ref 4.6–6.2)
SODIUM BLD-SCNC: 138 MMOL/L (ref 135–145)
WBC # BLD: 7.1 K/CU MM (ref 4–10.5)

## 2020-03-19 PROCEDURE — 36415 COLL VENOUS BLD VENIPUNCTURE: CPT

## 2020-03-19 PROCEDURE — 85027 COMPLETE CBC AUTOMATED: CPT

## 2020-03-19 PROCEDURE — 80048 BASIC METABOLIC PNL TOTAL CA: CPT

## 2020-04-08 LAB
EKG ATRIAL RATE: 90 BPM
EKG DIAGNOSIS: NORMAL
EKG Q-T INTERVAL: 410 MS
EKG QRS DURATION: 136 MS
EKG QTC CALCULATION (BAZETT): 520 MS
EKG R AXIS: 270 DEGREES
EKG T AXIS: 14 DEGREES
EKG VENTRICULAR RATE: 97 BPM

## 2020-06-19 ENCOUNTER — HOSPITAL ENCOUNTER (INPATIENT)
Age: 85
LOS: 5 days | Discharge: NURSING, REHAB OR LONG-TERM CARE | DRG: 193 | End: 2020-06-24
Attending: EMERGENCY MEDICINE | Admitting: INTERNAL MEDICINE
Payer: COMMERCIAL

## 2020-06-19 ENCOUNTER — APPOINTMENT (OUTPATIENT)
Dept: GENERAL RADIOLOGY | Age: 85
DRG: 193 | End: 2020-06-19
Payer: COMMERCIAL

## 2020-06-19 ENCOUNTER — APPOINTMENT (OUTPATIENT)
Dept: CT IMAGING | Age: 85
DRG: 193 | End: 2020-06-19
Payer: COMMERCIAL

## 2020-06-19 PROBLEM — J18.9 PNA (PNEUMONIA): Status: ACTIVE | Noted: 2020-06-19

## 2020-06-19 LAB
ALBUMIN SERPL-MCNC: 3.4 GM/DL (ref 3.4–5)
ALP BLD-CCNC: 75 IU/L (ref 40–129)
ALT SERPL-CCNC: 13 U/L (ref 10–40)
ANION GAP SERPL CALCULATED.3IONS-SCNC: 9 MMOL/L (ref 4–16)
AST SERPL-CCNC: 22 IU/L (ref 15–37)
BASOPHILS ABSOLUTE: 0 K/CU MM
BASOPHILS RELATIVE PERCENT: 0.5 % (ref 0–1)
BILIRUB SERPL-MCNC: 1.2 MG/DL (ref 0–1)
BUN BLDV-MCNC: 16 MG/DL (ref 6–23)
CALCIUM SERPL-MCNC: 9 MG/DL (ref 8.3–10.6)
CHLORIDE BLD-SCNC: 95 MMOL/L (ref 99–110)
CO2: 24 MMOL/L (ref 21–32)
CREAT SERPL-MCNC: 1.2 MG/DL (ref 0.9–1.3)
DIFFERENTIAL TYPE: ABNORMAL
EOSINOPHILS ABSOLUTE: 0.1 K/CU MM
EOSINOPHILS RELATIVE PERCENT: 0.6 % (ref 0–3)
GFR AFRICAN AMERICAN: >60 ML/MIN/1.73M2
GFR NON-AFRICAN AMERICAN: 58 ML/MIN/1.73M2
GLUCOSE BLD-MCNC: 126 MG/DL (ref 70–99)
HCT VFR BLD CALC: 33.8 % (ref 42–52)
HEMOGLOBIN: 10.8 GM/DL (ref 13.5–18)
IMMATURE NEUTROPHIL %: 1.6 % (ref 0–0.43)
LACTATE: 1.5 MMOL/L (ref 0.4–2)
LYMPHOCYTES ABSOLUTE: 1.3 K/CU MM
LYMPHOCYTES RELATIVE PERCENT: 15.3 % (ref 24–44)
MCH RBC QN AUTO: 32.1 PG (ref 27–31)
MCHC RBC AUTO-ENTMCNC: 32 % (ref 32–36)
MCV RBC AUTO: 100.6 FL (ref 78–100)
MONOCYTES ABSOLUTE: 0.6 K/CU MM
MONOCYTES RELATIVE PERCENT: 6.6 % (ref 0–4)
NUCLEATED RBC %: 0.6 %
PDW BLD-RTO: 21.2 % (ref 11.7–14.9)
PLATELET # BLD: 311 K/CU MM (ref 140–440)
PMV BLD AUTO: 9.3 FL (ref 7.5–11.1)
POTASSIUM SERPL-SCNC: 4.5 MMOL/L (ref 3.5–5.1)
PRO-BNP: 3700 PG/ML
RBC # BLD: 3.36 M/CU MM (ref 4.6–6.2)
SEGMENTED NEUTROPHILS ABSOLUTE COUNT: 6.5 K/CU MM
SEGMENTED NEUTROPHILS RELATIVE PERCENT: 75.4 % (ref 36–66)
SODIUM BLD-SCNC: 128 MMOL/L (ref 135–145)
TOTAL IMMATURE NEUTOROPHIL: 0.14 K/CU MM
TOTAL NUCLEATED RBC: 0.1 K/CU MM
TOTAL PROTEIN: 6.9 GM/DL (ref 6.4–8.2)
TROPONIN T: <0.01 NG/ML
WBC # BLD: 8.6 K/CU MM (ref 4–10.5)

## 2020-06-19 PROCEDURE — 99285 EMERGENCY DEPT VISIT HI MDM: CPT

## 2020-06-19 PROCEDURE — 2060000000 HC ICU INTERMEDIATE R&B

## 2020-06-19 PROCEDURE — 84484 ASSAY OF TROPONIN QUANT: CPT

## 2020-06-19 PROCEDURE — 87150 DNA/RNA AMPLIFIED PROBE: CPT

## 2020-06-19 PROCEDURE — 87040 BLOOD CULTURE FOR BACTERIA: CPT

## 2020-06-19 PROCEDURE — 80053 COMPREHEN METABOLIC PANEL: CPT

## 2020-06-19 PROCEDURE — 70450 CT HEAD/BRAIN W/O DYE: CPT

## 2020-06-19 PROCEDURE — 2580000003 HC RX 258: Performed by: EMERGENCY MEDICINE

## 2020-06-19 PROCEDURE — 83880 ASSAY OF NATRIURETIC PEPTIDE: CPT

## 2020-06-19 PROCEDURE — 83605 ASSAY OF LACTIC ACID: CPT

## 2020-06-19 PROCEDURE — 96365 THER/PROPH/DIAG IV INF INIT: CPT

## 2020-06-19 PROCEDURE — 71045 X-RAY EXAM CHEST 1 VIEW: CPT

## 2020-06-19 PROCEDURE — U0002 COVID-19 LAB TEST NON-CDC: HCPCS

## 2020-06-19 PROCEDURE — 93005 ELECTROCARDIOGRAM TRACING: CPT | Performed by: EMERGENCY MEDICINE

## 2020-06-19 PROCEDURE — 85025 COMPLETE CBC W/AUTO DIFF WBC: CPT

## 2020-06-19 PROCEDURE — 6360000002 HC RX W HCPCS: Performed by: EMERGENCY MEDICINE

## 2020-06-19 RX ADMIN — CEFTRIAXONE SODIUM 1 G: 1 INJECTION, POWDER, FOR SOLUTION INTRAMUSCULAR; INTRAVENOUS at 23:21

## 2020-06-19 NOTE — ED PROVIDER NOTES
BRAIN/VENTRICLES: There is no acute intracranial hemorrhage, mass effect or midline shift. No abnormal extra-axial fluid collection. The gray-white differentiation is maintained without evidence of an acute infarct. There is prominence of the ventricles and sulci due to global parenchymal volume loss. There are nonspecific areas of hypoattenuation within the periventricular and subcortical white matter, which likely represent chronic microvascular ischemic change. Anterior left external capsule hypoattenuating focus measures about 3 x 5 mm typical of sequela from lacunar stroke. ORBITS: The visualized portion of the orbits demonstrate no acute abnormality. SINUSES: The visualized paranasal sinuses and mastoid air cells demonstrate no acute abnormality. SOFT TISSUES/SKULL: No acute abnormality of the visualized skull or soft tissues. No acute intracranial abnormality. Senescent changes. Age indeterminate but likely old lacunar stroke anterior aspect of the left external capsule. Xr Chest Portable    Result Date: 6/19/2020  EXAMINATION: ONE XRAY VIEW OF THE CHEST 6/19/2020 7:54 pm COMPARISON: None. HISTORY: ORDERING SYSTEM PROVIDED HISTORY: chest pain Acuity: Acute Type of Exam: Initial Additional signs and symptoms: na Relevant Medical/Surgical History: na FINDINGS: The cardiac silhouette is enlarged. Calcifications involving the aorta reflect atherosclerosis. The mediastinal and hilar silhouettes appear unremarkable. Nonspecific diffuse alveolar infiltrates throughout the left lung. Vascular engorgement and cephalization is demonstrated with bilateral peribronchial cuffing and perivascular haziness. No definite pleural effusion. No pneumothorax is seen. No acute osseous abnormality is identified. Sequela from prior heart valve surgery. 1. Nonspecific infiltrates bilateral lungs greater on the left than right with other findings suggesting congestive heart failure.   Diffuse infection or early change of ARDS are other considerations. 2. Calcific atherosclerosis aorta. 3. Cardiomegaly. ED COURSE & MEDICAL DECISION MAKING       Vital signs and nursing notes reviewed during ED course. All pertinent Lab data and radiographic results reviewed with patient at bedside. The patient and/or the family were informed of the results of any tests/labs/imaging, the treatment plan, and time was allotted to answer questions. This is an 80-year-old male who presented with generalized weakness, falls. On arrival patient was hypoxic, requiring 4 L by nasal cannula. Maintained well on the 4 L. Initially afebrile, but then had a temp of 100.4. Work-up was initiated. Chest x-ray showed nonspecific infiltrates bilaterally, suggesting congestive heart failure. BNP was elevated. Wife denies any history of CHF. Given the fever antibiotics to cover community-acquired pneumonia were initiated. COVID testing was ordered due to fever and hypoxia. CT scan showed no acute intracranial abnormality, age indeterminant but likely old lacunar infarct. Given the hypoxia, fever, abnormal findings plan for admission for further evaluation and care. I did speak with patient's primary care physician Dr. Lara Mckinnon who states not currently admitting patients were tested for COVID. Spoke with hospitalist who agrees to admit at this time. Due to the immediate potential for life-threatening deterioration due to hypoxia, I spent 35 minutes providing critical care. This time is excluding time spent performing procedures.     Clinical  IMPRESSION    Hypoxia, weakness, elevated BNP          (Please note the MDM and HPI sections of this note were completed with a voice recognition program.  Efforts were made to edit the dictations but occasionally words are mis-transcribed.)      Asia Linares DO  06/19/20 0443

## 2020-06-20 LAB
BACTERIA: NEGATIVE /HPF
BILIRUBIN URINE: NEGATIVE MG/DL
BLOOD, URINE: NEGATIVE
CLARITY: CLEAR
COLOR: YELLOW
EKG ATRIAL RATE: 74 BPM
EKG ATRIAL RATE: 74 BPM
EKG DIAGNOSIS: NORMAL
EKG DIAGNOSIS: NORMAL
EKG P AXIS: 33 DEGREES
EKG P AXIS: 74 DEGREES
EKG P-R INTERVAL: 132 MS
EKG P-R INTERVAL: 160 MS
EKG Q-T INTERVAL: 456 MS
EKG Q-T INTERVAL: 474 MS
EKG QRS DURATION: 128 MS
EKG QRS DURATION: 130 MS
EKG QTC CALCULATION (BAZETT): 506 MS
EKG QTC CALCULATION (BAZETT): 522 MS
EKG R AXIS: -73 DEGREES
EKG R AXIS: -81 DEGREES
EKG T AXIS: 16 DEGREES
EKG T AXIS: 24 DEGREES
EKG VENTRICULAR RATE: 73 BPM
EKG VENTRICULAR RATE: 74 BPM
GLUCOSE, URINE: NEGATIVE MG/DL
HIGH SENSITIVE C-REACTIVE PROTEIN: 132.1 MG/L
HYALINE CASTS: 0 /LPF
KETONES, URINE: NEGATIVE MG/DL
LEUKOCYTE ESTERASE, URINE: NEGATIVE
NITRITE URINE, QUANTITATIVE: NEGATIVE
PH, URINE: 5 (ref 5–8)
PROCALCITONIN: 0.1
PROTEIN UA: NEGATIVE MG/DL
RBC URINE: <1 /HPF (ref 0–3)
SPECIFIC GRAVITY UA: 1.01 (ref 1–1.03)
TRICHOMONAS: NORMAL /HPF
UROBILINOGEN, URINE: NORMAL MG/DL (ref 0.2–1)
WBC UA: <1 /HPF (ref 0–2)

## 2020-06-20 PROCEDURE — 84145 PROCALCITONIN (PCT): CPT

## 2020-06-20 PROCEDURE — 6370000000 HC RX 637 (ALT 250 FOR IP): Performed by: INTERNAL MEDICINE

## 2020-06-20 PROCEDURE — 81001 URINALYSIS AUTO W/SCOPE: CPT

## 2020-06-20 PROCEDURE — 93010 ELECTROCARDIOGRAM REPORT: CPT | Performed by: INTERNAL MEDICINE

## 2020-06-20 PROCEDURE — 86141 C-REACTIVE PROTEIN HS: CPT

## 2020-06-20 PROCEDURE — 6360000002 HC RX W HCPCS: Performed by: INTERNAL MEDICINE

## 2020-06-20 PROCEDURE — 2700000000 HC OXYGEN THERAPY PER DAY

## 2020-06-20 PROCEDURE — 94761 N-INVAS EAR/PLS OXIMETRY MLT: CPT

## 2020-06-20 PROCEDURE — 2060000000 HC ICU INTERMEDIATE R&B

## 2020-06-20 PROCEDURE — 2580000003 HC RX 258: Performed by: INTERNAL MEDICINE

## 2020-06-20 RX ORDER — MEMANTINE HYDROCHLORIDE 10 MG/1
10 TABLET ORAL NIGHTLY
Status: ON HOLD | COMMUNITY
End: 2020-07-10 | Stop reason: HOSPADM

## 2020-06-20 RX ORDER — ACETAMINOPHEN 325 MG/1
650 TABLET ORAL EVERY 6 HOURS PRN
COMMUNITY

## 2020-06-20 RX ORDER — DOXYCYCLINE HYCLATE 100 MG
100 TABLET ORAL 2 TIMES DAILY
Status: DISCONTINUED | OUTPATIENT
Start: 2020-06-20 | End: 2020-06-24 | Stop reason: HOSPADM

## 2020-06-20 RX ORDER — ACETAMINOPHEN 650 MG/1
650 SUPPOSITORY RECTAL EVERY 6 HOURS PRN
Status: DISCONTINUED | OUTPATIENT
Start: 2020-06-20 | End: 2020-06-24 | Stop reason: HOSPADM

## 2020-06-20 RX ORDER — DULOXETIN HYDROCHLORIDE 20 MG/1
10 CAPSULE, DELAYED RELEASE ORAL NIGHTLY
COMMUNITY

## 2020-06-20 RX ORDER — TAMSULOSIN HYDROCHLORIDE 0.4 MG/1
0.4 CAPSULE ORAL NIGHTLY
COMMUNITY

## 2020-06-20 RX ORDER — FUROSEMIDE 40 MG/1
40 TABLET ORAL DAILY
COMMUNITY

## 2020-06-20 RX ORDER — HEPARIN SODIUM 5000 [USP'U]/ML
5000 INJECTION, SOLUTION INTRAVENOUS; SUBCUTANEOUS EVERY 8 HOURS SCHEDULED
Status: DISCONTINUED | OUTPATIENT
Start: 2020-06-20 | End: 2020-06-21 | Stop reason: ALTCHOICE

## 2020-06-20 RX ORDER — PROMETHAZINE HYDROCHLORIDE 25 MG/1
12.5 TABLET ORAL EVERY 6 HOURS PRN
Status: DISCONTINUED | OUTPATIENT
Start: 2020-06-20 | End: 2020-06-24 | Stop reason: HOSPADM

## 2020-06-20 RX ORDER — POTASSIUM CHLORIDE 750 MG/1
10 CAPSULE, EXTENDED RELEASE ORAL DAILY
COMMUNITY

## 2020-06-20 RX ORDER — SODIUM CHLORIDE 0.9 % (FLUSH) 0.9 %
10 SYRINGE (ML) INJECTION EVERY 12 HOURS SCHEDULED
Status: DISCONTINUED | OUTPATIENT
Start: 2020-06-20 | End: 2020-06-24 | Stop reason: HOSPADM

## 2020-06-20 RX ORDER — OXYCODONE HYDROCHLORIDE 5 MG/1
5 TABLET ORAL EVERY 8 HOURS PRN
Status: ON HOLD | COMMUNITY
End: 2020-06-24 | Stop reason: HOSPADM

## 2020-06-20 RX ORDER — POLYETHYLENE GLYCOL 3350 17 G/17G
17 POWDER, FOR SOLUTION ORAL DAILY PRN
Status: DISCONTINUED | OUTPATIENT
Start: 2020-06-20 | End: 2020-06-24 | Stop reason: HOSPADM

## 2020-06-20 RX ORDER — M-VIT,TX,IRON,MINS/CALC/FOLIC 27MG-0.4MG
1 TABLET ORAL DAILY
COMMUNITY

## 2020-06-20 RX ORDER — SODIUM CHLORIDE 0.9 % (FLUSH) 0.9 %
10 SYRINGE (ML) INJECTION PRN
Status: DISCONTINUED | OUTPATIENT
Start: 2020-06-20 | End: 2020-06-24 | Stop reason: HOSPADM

## 2020-06-20 RX ORDER — ACETAMINOPHEN 325 MG/1
650 TABLET ORAL EVERY 6 HOURS PRN
Status: DISCONTINUED | OUTPATIENT
Start: 2020-06-20 | End: 2020-06-24 | Stop reason: HOSPADM

## 2020-06-20 RX ORDER — GABAPENTIN 400 MG/1
400 CAPSULE ORAL 4 TIMES DAILY
Status: ON HOLD | COMMUNITY
End: 2020-06-24 | Stop reason: SDUPTHER

## 2020-06-20 RX ORDER — ONDANSETRON 2 MG/ML
4 INJECTION INTRAMUSCULAR; INTRAVENOUS EVERY 6 HOURS PRN
Status: DISCONTINUED | OUTPATIENT
Start: 2020-06-20 | End: 2020-06-24 | Stop reason: HOSPADM

## 2020-06-20 RX ORDER — DONEPEZIL HYDROCHLORIDE 10 MG/1
10 TABLET, FILM COATED ORAL NIGHTLY
COMMUNITY

## 2020-06-20 RX ORDER — SOTALOL HYDROCHLORIDE 80 MG/1
40 TABLET ORAL 2 TIMES DAILY
Status: ON HOLD | COMMUNITY
End: 2020-07-10 | Stop reason: HOSPADM

## 2020-06-20 RX ORDER — FUROSEMIDE 10 MG/ML
20 INJECTION INTRAMUSCULAR; INTRAVENOUS ONCE
Status: COMPLETED | OUTPATIENT
Start: 2020-06-20 | End: 2020-06-20

## 2020-06-20 RX ADMIN — CEFTRIAXONE 1 G: 1 INJECTION, POWDER, FOR SOLUTION INTRAMUSCULAR; INTRAVENOUS at 23:22

## 2020-06-20 RX ADMIN — HEPARIN SODIUM 5000 UNITS: 5000 INJECTION, SOLUTION INTRAVENOUS; SUBCUTANEOUS at 14:43

## 2020-06-20 RX ADMIN — DOXYCYCLINE HYCLATE 100 MG: 100 TABLET, COATED ORAL at 02:14

## 2020-06-20 RX ADMIN — ACETAMINOPHEN 650 MG: 325 TABLET ORAL at 02:06

## 2020-06-20 RX ADMIN — HEPARIN SODIUM 5000 UNITS: 5000 INJECTION, SOLUTION INTRAVENOUS; SUBCUTANEOUS at 21:43

## 2020-06-20 RX ADMIN — DOXYCYCLINE HYCLATE 100 MG: 100 TABLET, COATED ORAL at 20:24

## 2020-06-20 RX ADMIN — SODIUM CHLORIDE, PRESERVATIVE FREE 10 ML: 5 INJECTION INTRAVENOUS at 09:11

## 2020-06-20 RX ADMIN — FUROSEMIDE 20 MG: 10 INJECTION, SOLUTION INTRAVENOUS at 02:06

## 2020-06-20 RX ADMIN — ACETAMINOPHEN 650 MG: 325 TABLET ORAL at 20:24

## 2020-06-20 RX ADMIN — SODIUM CHLORIDE, PRESERVATIVE FREE 10 ML: 5 INJECTION INTRAVENOUS at 20:25

## 2020-06-20 RX ADMIN — DOXYCYCLINE HYCLATE 100 MG: 100 TABLET, COATED ORAL at 09:09

## 2020-06-20 RX ADMIN — PROMETHAZINE HYDROCHLORIDE 12.5 MG: 25 TABLET ORAL at 02:06

## 2020-06-20 SDOH — HEALTH STABILITY: MENTAL HEALTH: HOW OFTEN DO YOU HAVE A DRINK CONTAINING ALCOHOL?: NEVER

## 2020-06-20 ASSESSMENT — PAIN SCALES - GENERAL
PAINLEVEL_OUTOF10: 0
PAINLEVEL_OUTOF10: 6
PAINLEVEL_OUTOF10: 7
PAINLEVEL_OUTOF10: 0
PAINLEVEL_OUTOF10: 0

## 2020-06-20 NOTE — PROGRESS NOTES
Hospitalist Progress Note      Name:  Rad Brock /Age/Sex: 1935  (80 y.o. male)   MRN & CSN:  [id number] & [id number] Admission Date/Time: 2020  7:15 PM   Location:  -A PCP: Aranza Mckeon MD         Hospital Day: 2    Assessment and Plan:   Rad Brock is a 80 y.o.  male  who presents with:    · Acute Respiratory failure with hypoxia 2/2 probably Gram positive PNA. CXR: Non specific infiltrates bilateral lungs. Tmax 99 on admission. · COVID collected : pending  · Continue oxygen support, currently requiring 4L  · Mechanical Fall. · Up with assistance. Pt/ot. · Consider placement, ARU  · Chronic dementia    Diet DIET LOW SODIUM 2 GM;   DVT Prophylaxis [] Lovenox, []  Heparin, [] SCDs, []No VTE prophylaxis, patient ambulating   GI Prophylaxis [] PPI, [] H2 Blocker, [] No GI prophylaxis, patient is receiving diet/Tube Feeds   Code Status DNR-CCA   Disposition Patient requires continued admission due to oxygen support and pending covid testing   MDM [] Low, [x] Moderate,[]  High  Patient's risk as above due to     [] One or more chronic illnesses with mild exacerbation progression    [x] Two or more stable chronic illnesses    [] Undiagnosed new problem with uncertain prognosis    [] Elective major surgery    []Prescription drug management     History of Present Illness:     Pt S&E. Breathing comfortably. No problems reported since admission  No fevers since admission    No ROS as he is sleeping comfortably. Objective:   No intake or output data in the 24 hours ending 20 0900   Vitals:   Vitals:    20 0730   BP: (!) 115/59   Pulse: 72   Resp: 22   Temp: 97.9 °F (36.6 °C)   SpO2: 99%     Physical Exam:    GEN male, laying in bed in no apparent distress. Appears given age. EYES Pupils are equally round.   No scleral discharge  HENT Atraumatic and symmetric head  NECK No apparent thyromegaly  RESP Symmetric chest movement while on room air.  CARDIO/VASC Peripheral pulses equal bilaterally and palpable. No peripheral edema. GI Abdomen is not distended. Rectal exam deferred.  Jamison catheter is not present. HEME/LYMPH No petechiae or ecchymoses. MSK Spontaneous movement of BL upper extremities  SKIN Normal coloration, warm, dry.   NEURO Cranial nerves appear grossly intact    Medications:   Medications:    sodium chloride flush  10 mL Intravenous 2 times per day    heparin (porcine)  5,000 Units Subcutaneous 3 times per day    cefTRIAXone (ROCEPHIN) IV  1 g Intravenous Q24H    doxycycline hyclate  100 mg Oral BID      Infusions:   PRN Meds: sodium chloride flush, 10 mL, PRN  acetaminophen, 650 mg, Q6H PRN    Or  acetaminophen, 650 mg, Q6H PRN  polyethylene glycol, 17 g, Daily PRN  promethazine, 12.5 mg, Q6H PRN    Or  ondansetron, 4 mg, Q6H PRN          Electronically signed by Yaritza Tamez DO on 6/20/2020 at 9:00 AM

## 2020-06-20 NOTE — ED NOTES
Bed: ED-31  Expected date:   Expected time:   Means of arrival:   Comments:  [address], [facility], RN  06/19/20 2015

## 2020-06-20 NOTE — H&P
without exudates, no evidence of thrush. NECK Supple, no apparent thyromegaly or masses. RESP Clear to auscultation, no wheezes, rales or rhonchi. Symmetric chest movement while on room air. CARDIO/VASC S1/S2 auscultated. Regular rate without appreciable murmurs, rubs, or gallops. No JVD or carotid bruits. Peripheral pulses equal bilaterally and palpable. No peripheral edema. GI Abdomen is soft without significant tenderness, masses, or guarding. Bowel sounds are normoactive. Rectal exam deferred.  No costovertebral angle tenderness. Normal appearing external genitalia. Jamison catheter is not present. HEME/LYMPH No palpable cervical lymphadenopathy and no hepatosplenomegaly. No petechiae or ecchymoses. MSK No gross joint deformities. SKIN Normal coloration, warm, dry. NEURO Cranial nerves appear grossly intact, normal speech, no lateralizing weakness. PSYCH Awake, alert, oriented x 4. Affect appropriate. Past Medical History:    No past medical history on file. PSHX: Reviewed but non contributory  Allergies:  Allergies not on file    FAM HX: Reviewed and non contributory    Soc HX:   Social History     Socioeconomic History    Marital status:      Spouse name: Not on file    Number of children: Not on file    Years of education: Not on file    Highest education level: Not on file   Occupational History    Not on file   Social Needs    Financial resource strain: Not on file    Food insecurity     Worry: Not on file     Inability: Not on file    Transportation needs     Medical: Not on file     Non-medical: Not on file   Tobacco Use    Smoking status: Not on file   Substance and Sexual Activity    Alcohol use: Not on file    Drug use: Not on file    Sexual activity: Not on file   Lifestyle    Physical activity     Days per week: Not on file     Minutes per session: Not on file    Stress: Not on file   Relationships    Social connections     Talks on phone: Not on file     Gets

## 2020-06-21 LAB
SARS-COV-2: NOT DETECTED
SOURCE: NORMAL

## 2020-06-21 PROCEDURE — 2580000003 HC RX 258: Performed by: INTERNAL MEDICINE

## 2020-06-21 PROCEDURE — 6360000002 HC RX W HCPCS: Performed by: INTERNAL MEDICINE

## 2020-06-21 PROCEDURE — 2700000000 HC OXYGEN THERAPY PER DAY

## 2020-06-21 PROCEDURE — 87040 BLOOD CULTURE FOR BACTERIA: CPT

## 2020-06-21 PROCEDURE — 1200000000 HC SEMI PRIVATE

## 2020-06-21 PROCEDURE — 94761 N-INVAS EAR/PLS OXIMETRY MLT: CPT

## 2020-06-21 PROCEDURE — 6370000000 HC RX 637 (ALT 250 FOR IP): Performed by: STUDENT IN AN ORGANIZED HEALTH CARE EDUCATION/TRAINING PROGRAM

## 2020-06-21 PROCEDURE — 6370000000 HC RX 637 (ALT 250 FOR IP): Performed by: INTERNAL MEDICINE

## 2020-06-21 RX ORDER — DONEPEZIL HYDROCHLORIDE 10 MG/1
10 TABLET, FILM COATED ORAL NIGHTLY
Status: DISCONTINUED | OUTPATIENT
Start: 2020-06-21 | End: 2020-06-24 | Stop reason: HOSPADM

## 2020-06-21 RX ORDER — MEMANTINE HYDROCHLORIDE 10 MG/1
10 TABLET ORAL NIGHTLY
Status: DISCONTINUED | OUTPATIENT
Start: 2020-06-21 | End: 2020-06-24 | Stop reason: HOSPADM

## 2020-06-21 RX ORDER — FUROSEMIDE 40 MG/1
40 TABLET ORAL DAILY
Status: DISCONTINUED | OUTPATIENT
Start: 2020-06-21 | End: 2020-06-24 | Stop reason: HOSPADM

## 2020-06-21 RX ORDER — SOTALOL HYDROCHLORIDE 80 MG/1
40 TABLET ORAL 2 TIMES DAILY
Status: DISCONTINUED | OUTPATIENT
Start: 2020-06-21 | End: 2020-06-24 | Stop reason: HOSPADM

## 2020-06-21 RX ORDER — TAMSULOSIN HYDROCHLORIDE 0.4 MG/1
0.4 CAPSULE ORAL NIGHTLY
Status: DISCONTINUED | OUTPATIENT
Start: 2020-06-21 | End: 2020-06-24 | Stop reason: HOSPADM

## 2020-06-21 RX ORDER — POTASSIUM CHLORIDE 750 MG/1
10 TABLET, FILM COATED, EXTENDED RELEASE ORAL DAILY
Status: DISCONTINUED | OUTPATIENT
Start: 2020-06-22 | End: 2020-06-24 | Stop reason: HOSPADM

## 2020-06-21 RX ADMIN — DOXYCYCLINE HYCLATE 100 MG: 100 TABLET, COATED ORAL at 10:34

## 2020-06-21 RX ADMIN — TAMSULOSIN HYDROCHLORIDE 0.4 MG: 0.4 CAPSULE ORAL at 23:20

## 2020-06-21 RX ADMIN — SOTALOL HYDROCHLORIDE 40 MG: 80 TABLET ORAL at 12:23

## 2020-06-21 RX ADMIN — HEPARIN SODIUM 5000 UNITS: 5000 INJECTION, SOLUTION INTRAVENOUS; SUBCUTANEOUS at 05:42

## 2020-06-21 RX ADMIN — MEMANTINE 10 MG: 10 TABLET ORAL at 23:18

## 2020-06-21 RX ADMIN — SOTALOL HYDROCHLORIDE 40 MG: 80 TABLET ORAL at 23:19

## 2020-06-21 RX ADMIN — ACETAMINOPHEN 650 MG: 325 TABLET ORAL at 23:19

## 2020-06-21 RX ADMIN — FUROSEMIDE 40 MG: 40 TABLET ORAL at 12:23

## 2020-06-21 RX ADMIN — APIXABAN 2.5 MG: 2.5 TABLET, FILM COATED ORAL at 12:23

## 2020-06-21 RX ADMIN — SODIUM CHLORIDE, PRESERVATIVE FREE 10 ML: 5 INJECTION INTRAVENOUS at 23:25

## 2020-06-21 RX ADMIN — DOXYCYCLINE HYCLATE 100 MG: 100 TABLET, COATED ORAL at 23:18

## 2020-06-21 RX ADMIN — CEFTRIAXONE 1 G: 1 INJECTION, POWDER, FOR SOLUTION INTRAMUSCULAR; INTRAVENOUS at 23:20

## 2020-06-21 RX ADMIN — DONEPEZIL HYDROCHLORIDE 10 MG: 10 TABLET, FILM COATED ORAL at 23:20

## 2020-06-21 RX ADMIN — APIXABAN 2.5 MG: 2.5 TABLET, FILM COATED ORAL at 23:19

## 2020-06-21 RX ADMIN — SODIUM CHLORIDE, PRESERVATIVE FREE 10 ML: 5 INJECTION INTRAVENOUS at 10:34

## 2020-06-21 ASSESSMENT — PAIN SCALES - GENERAL
PAINLEVEL_OUTOF10: 0
PAINLEVEL_OUTOF10: 8

## 2020-06-21 ASSESSMENT — PAIN DESCRIPTION - ONSET: ONSET: ON-GOING

## 2020-06-21 ASSESSMENT — PAIN DESCRIPTION - PROGRESSION: CLINICAL_PROGRESSION: NOT CHANGED

## 2020-06-21 ASSESSMENT — PAIN DESCRIPTION - FREQUENCY: FREQUENCY: INTERMITTENT

## 2020-06-21 ASSESSMENT — PAIN DESCRIPTION - PAIN TYPE: TYPE: CHRONIC PAIN

## 2020-06-21 ASSESSMENT — PAIN DESCRIPTION - LOCATION: LOCATION: BACK

## 2020-06-21 ASSESSMENT — PAIN DESCRIPTION - DESCRIPTORS: DESCRIPTORS: ACHING

## 2020-06-21 ASSESSMENT — PAIN - FUNCTIONAL ASSESSMENT: PAIN_FUNCTIONAL_ASSESSMENT: PREVENTS OR INTERFERES SOME ACTIVE ACTIVITIES AND ADLS

## 2020-06-21 NOTE — PROGRESS NOTES
apparent distress. Appears given age. EYES Pupils are equally round. No scleral discharge  HENT Atraumatic and symmetric head  NECK No apparent thyromegaly  RESP Symmetric chest movement while on room air. CARDIO/VASC Peripheral pulses equal bilaterally and palpable. No peripheral edema. GI Abdomen is not distended. Rectal exam deferred.  Jamison catheter is not present. HEME/LYMPH No petechiae or ecchymoses. MSK Spontaneous movement of BL upper extremities  SKIN Normal coloration, warm, dry. NEURO Cranial nerves appear grossly intact. No slurring   PSYCH Alert but not oriented.      Medications:   Medications:    sodium chloride flush  10 mL Intravenous 2 times per day    heparin (porcine)  5,000 Units Subcutaneous 3 times per day    cefTRIAXone (ROCEPHIN) IV  1 g Intravenous Q24H    doxycycline hyclate  100 mg Oral BID      Infusions:   PRN Meds: sodium chloride flush, 10 mL, PRN  acetaminophen, 650 mg, Q6H PRN    Or  acetaminophen, 650 mg, Q6H PRN  polyethylene glycol, 17 g, Daily PRN  promethazine, 12.5 mg, Q6H PRN    Or  ondansetron, 4 mg, Q6H PRN          Electronically signed by Layne Brownlee DO on 6/21/2020 at 11:33 AM
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
## 2020-06-22 ENCOUNTER — APPOINTMENT (OUTPATIENT)
Dept: GENERAL RADIOLOGY | Age: 85
DRG: 193 | End: 2020-06-22
Payer: COMMERCIAL

## 2020-06-22 PROCEDURE — 1200000000 HC SEMI PRIVATE

## 2020-06-22 PROCEDURE — 94761 N-INVAS EAR/PLS OXIMETRY MLT: CPT

## 2020-06-22 PROCEDURE — 6370000000 HC RX 637 (ALT 250 FOR IP): Performed by: INTERNAL MEDICINE

## 2020-06-22 PROCEDURE — 2700000000 HC OXYGEN THERAPY PER DAY

## 2020-06-22 PROCEDURE — 6370000000 HC RX 637 (ALT 250 FOR IP): Performed by: STUDENT IN AN ORGANIZED HEALTH CARE EDUCATION/TRAINING PROGRAM

## 2020-06-22 PROCEDURE — 97530 THERAPEUTIC ACTIVITIES: CPT

## 2020-06-22 PROCEDURE — 2580000003 HC RX 258: Performed by: INTERNAL MEDICINE

## 2020-06-22 PROCEDURE — 97162 PT EVAL MOD COMPLEX 30 MIN: CPT

## 2020-06-22 PROCEDURE — 97535 SELF CARE MNGMENT TRAINING: CPT

## 2020-06-22 PROCEDURE — 6360000002 HC RX W HCPCS: Performed by: INTERNAL MEDICINE

## 2020-06-22 PROCEDURE — 97166 OT EVAL MOD COMPLEX 45 MIN: CPT

## 2020-06-22 PROCEDURE — 71045 X-RAY EXAM CHEST 1 VIEW: CPT

## 2020-06-22 RX ADMIN — SOTALOL HYDROCHLORIDE 40 MG: 80 TABLET ORAL at 22:00

## 2020-06-22 RX ADMIN — CEFTRIAXONE 1 G: 1 INJECTION, POWDER, FOR SOLUTION INTRAMUSCULAR; INTRAVENOUS at 22:02

## 2020-06-22 RX ADMIN — APIXABAN 2.5 MG: 2.5 TABLET, FILM COATED ORAL at 22:00

## 2020-06-22 RX ADMIN — TAMSULOSIN HYDROCHLORIDE 0.4 MG: 0.4 CAPSULE ORAL at 22:00

## 2020-06-22 RX ADMIN — APIXABAN 2.5 MG: 2.5 TABLET, FILM COATED ORAL at 09:18

## 2020-06-22 RX ADMIN — DOXYCYCLINE HYCLATE 100 MG: 100 TABLET, COATED ORAL at 22:00

## 2020-06-22 RX ADMIN — FUROSEMIDE 40 MG: 40 TABLET ORAL at 09:18

## 2020-06-22 RX ADMIN — SOTALOL HYDROCHLORIDE 40 MG: 80 TABLET ORAL at 09:18

## 2020-06-22 RX ADMIN — SODIUM CHLORIDE, PRESERVATIVE FREE 10 ML: 5 INJECTION INTRAVENOUS at 21:59

## 2020-06-22 RX ADMIN — SODIUM CHLORIDE, PRESERVATIVE FREE 10 ML: 5 INJECTION INTRAVENOUS at 09:19

## 2020-06-22 RX ADMIN — ACETAMINOPHEN 650 MG: 325 TABLET ORAL at 23:36

## 2020-06-22 RX ADMIN — MEMANTINE 10 MG: 10 TABLET ORAL at 22:00

## 2020-06-22 RX ADMIN — POTASSIUM CHLORIDE 10 MEQ: 750 TABLET, FILM COATED, EXTENDED RELEASE ORAL at 09:18

## 2020-06-22 RX ADMIN — DOXYCYCLINE HYCLATE 100 MG: 100 TABLET, COATED ORAL at 09:18

## 2020-06-22 RX ADMIN — DONEPEZIL HYDROCHLORIDE 10 MG: 10 TABLET, FILM COATED ORAL at 22:00

## 2020-06-22 ASSESSMENT — PAIN DESCRIPTION - ORIENTATION: ORIENTATION: RIGHT;LEFT

## 2020-06-22 ASSESSMENT — PAIN DESCRIPTION - ONSET: ONSET: ON-GOING

## 2020-06-22 ASSESSMENT — PAIN DESCRIPTION - DESCRIPTORS: DESCRIPTORS: ACHING

## 2020-06-22 ASSESSMENT — PAIN DESCRIPTION - LOCATION: LOCATION: BACK;LEG

## 2020-06-22 ASSESSMENT — PAIN SCALES - GENERAL: PAINLEVEL_OUTOF10: 8

## 2020-06-22 ASSESSMENT — PAIN DESCRIPTION - FREQUENCY: FREQUENCY: CONTINUOUS

## 2020-06-22 ASSESSMENT — PAIN SCALES - WONG BAKER: WONGBAKER_NUMERICALRESPONSE: 0

## 2020-06-22 ASSESSMENT — PAIN DESCRIPTION - PAIN TYPE: TYPE: CHRONIC PAIN

## 2020-06-22 NOTE — PROGRESS NOTES
INTERNAL MEDICINE PROGRESS NOTE        Jono Langley   1935   Primary Care Physician:  Dane Robles MD  Admit Date: 6/19/2020     Subjective:   Patient is an 24-year-old male with history of dementia, HTN, HLD, DM, and atrial fibrillation. He presented with weakness and dyspnea. In the ER,  bilateral nonspecific infiltrates seen on CXR and elevated BNP. Initially needed 4 L O2. During hospital course, he developed low-grade fever. Also,  blood cultures positive for staph so he has been given IV abx and his fever and hypoxia have improved. He was also give IV lasix. He was in [facility] unit and tested negative. Dr. Tali Plummer is taking over care now as he is off [facility] floor. Today, O2 burden is 2L NC. He is confused, he is alert and oriented to person and place right now. Denies chest pain, abdominal pain, f/c, c/d, nausea.       Objective:   /67   Pulse 68   Temp 97.7 °F (36.5 °C) (Oral)   Resp 22   Ht 5' 10\" (1.778 m)   Wt 179 lb 4.8 oz (81.3 kg)   SpO2 95%   BMI 25.73 kg/m²    General appearance: alert, appears stated age and cooperative, confused  Head: Normocephalic, without obvious abnormality, atraumatic  Neck: no adenopathy and supple, symmetrical, trachea midline  Lungs: clear to auscultation bilaterally  Heart: regular rate and rhythm and S1, S2 normal  Abdomen: soft, non-tender; bowel sounds normal; no masses,  no organomegaly  Extremities: no clubbing, cyanosis or edema  Neurologic: Grossly normal    Data Review  Lab Results   Component Value Date     (L) 06/19/2020    K 4.5 06/19/2020    CL 95 (L) 06/19/2020    CO2 24 06/19/2020    CREATININE 1.2 06/19/2020    BUN 16 06/19/2020    CALCIUM 9.0 06/19/2020     Lab Results   Component Value Date    WBC 8.6 06/19/2020    HGB 10.8 (L) 06/19/2020    HCT 33.8 (L) 06/19/2020    .6 (H) 06/19/2020     06/19/2020     INR/Prothrombin Time      Meds:    apixaban  2.5 mg Oral BID    donepezil  10 mg Oral Nightly    furosemide  40 mg Oral Daily    memantine  10 mg Oral Nightly    sotalol  40 mg Oral BID    tamsulosin  0.4 mg Oral Nightly    potassium chloride  10 mEq Oral Daily    sodium chloride flush  10 mL Intravenous 2 times per day    cefTRIAXone (ROCEPHIN) IV  1 g Intravenous Q24H    doxycycline hyclate  100 mg Oral BID     PRN Meds: sodium chloride flush, acetaminophen **OR** acetaminophen, polyethylene glycol, promethazine **OR** ondansetron    Assessment/Plan:   Patient Active Hospital Problem List:  Patient Active Problem List   Diagnosis    PNA (pneumonia)     Acute Hypoxic Resp. Failure: improved on O2 2L NC. COVID Negative. +blood cx. Could be contaminant - repeat blood cx x2. Also elevated BNP. Pneumonia: cont ceftriaxone and doxycycline. Procal neg, wbc negative. Elevated BNP: 3700. Continue lasix. Repeat BNP. Recent Mechanical Fall: PT/OT evals pending. Dementia: cont aricept and namenda. HTN: on BB.   BPH: on flomax. Chronic atrial fibrillation. Rate controlled on BB. On Eliquis. Hyponatremia; 128 earlier. Repeat BMP today. Plan:  Cont IV abx. Repeat CXR. Repeat cbc, cmp. Cont lasix, recheck pro BNP. Pending PT/OT evals. Monitor labs and patient condition. Electronically signed by Caridad Quinones PA-C on 6/22/2020 at 8:24 AM   I have independently evaluated and examined this patient today. I have reviewed radiologic and biochemical tests on this patient. Management Plan is developed mutually with KYLE Westbrook. I have reviewed above note and agree with assessment and plan. Discussed with patient. Monitor for now. I will discuss with family also.

## 2020-06-22 NOTE — PROGRESS NOTES
Program; Patient/Caregiver Education & Training; Modalities; Positioning; Equipment Evaluation, Education, & procurement;  Safety Education & Training  Recommendations for NURSING mobility: ambulate to bathroom with RW      Time:   Time in:1256  Time out: 1316  Timed treatment minutes: 10  Total time: 20    Electronically signed by:    Marcy Cheema [id number]  6/22/2020, 1:19 PM

## 2020-06-23 LAB
ALBUMIN SERPL-MCNC: 3.5 GM/DL (ref 3.4–5)
ALP BLD-CCNC: 88 IU/L (ref 40–128)
ALT SERPL-CCNC: 28 U/L (ref 10–40)
ANION GAP SERPL CALCULATED.3IONS-SCNC: 10 MMOL/L (ref 4–16)
AST SERPL-CCNC: 31 IU/L (ref 15–37)
BASOPHILS ABSOLUTE: 0 K/CU MM
BASOPHILS RELATIVE PERCENT: 0.5 % (ref 0–1)
BILIRUB SERPL-MCNC: 1.1 MG/DL (ref 0–1)
BUN BLDV-MCNC: 24 MG/DL (ref 6–23)
CALCIUM SERPL-MCNC: 9.3 MG/DL (ref 8.3–10.6)
CHLORIDE BLD-SCNC: 102 MMOL/L (ref 99–110)
CO2: 27 MMOL/L (ref 21–32)
CREAT SERPL-MCNC: 1.2 MG/DL (ref 0.9–1.3)
CULTURE: ABNORMAL
DIFFERENTIAL TYPE: ABNORMAL
EOSINOPHILS ABSOLUTE: 0.3 K/CU MM
EOSINOPHILS RELATIVE PERCENT: 3.7 % (ref 0–3)
GFR AFRICAN AMERICAN: >60 ML/MIN/1.73M2
GFR NON-AFRICAN AMERICAN: 58 ML/MIN/1.73M2
GLUCOSE BLD-MCNC: 136 MG/DL (ref 70–99)
HCT VFR BLD CALC: 37.7 % (ref 42–52)
HEMOGLOBIN: 11.8 GM/DL (ref 13.5–18)
IMMATURE NEUTROPHIL %: 1.7 % (ref 0–0.43)
LYMPHOCYTES ABSOLUTE: 1.8 K/CU MM
LYMPHOCYTES RELATIVE PERCENT: 21.5 % (ref 24–44)
Lab: ABNORMAL
MCH RBC QN AUTO: 31.6 PG (ref 27–31)
MCHC RBC AUTO-ENTMCNC: 31.3 % (ref 32–36)
MCV RBC AUTO: 100.8 FL (ref 78–100)
MONOCYTES ABSOLUTE: 0.6 K/CU MM
MONOCYTES RELATIVE PERCENT: 7.2 % (ref 0–4)
NUCLEATED RBC %: 0.2 %
PDW BLD-RTO: 20.7 % (ref 11.7–14.9)
PLATELET # BLD: 332 K/CU MM (ref 140–440)
PMV BLD AUTO: 9.6 FL (ref 7.5–11.1)
POTASSIUM SERPL-SCNC: 4.5 MMOL/L (ref 3.5–5.1)
PRO-BNP: 517.3 PG/ML
RBC # BLD: 3.74 M/CU MM (ref 4.6–6.2)
SEGMENTED NEUTROPHILS ABSOLUTE COUNT: 5.4 K/CU MM
SEGMENTED NEUTROPHILS RELATIVE PERCENT: 65.4 % (ref 36–66)
SODIUM BLD-SCNC: 139 MMOL/L (ref 135–145)
SPECIMEN: ABNORMAL
TOTAL IMMATURE NEUTOROPHIL: 0.14 K/CU MM
TOTAL NUCLEATED RBC: 0 K/CU MM
TOTAL PROTEIN: 6.7 GM/DL (ref 6.4–8.2)
WBC # BLD: 8.2 K/CU MM (ref 4–10.5)

## 2020-06-23 PROCEDURE — 94761 N-INVAS EAR/PLS OXIMETRY MLT: CPT

## 2020-06-23 PROCEDURE — 6360000002 HC RX W HCPCS: Performed by: INTERNAL MEDICINE

## 2020-06-23 PROCEDURE — 1200000000 HC SEMI PRIVATE

## 2020-06-23 PROCEDURE — 2580000003 HC RX 258: Performed by: INTERNAL MEDICINE

## 2020-06-23 PROCEDURE — 6370000000 HC RX 637 (ALT 250 FOR IP): Performed by: INTERNAL MEDICINE

## 2020-06-23 PROCEDURE — 6370000000 HC RX 637 (ALT 250 FOR IP): Performed by: STUDENT IN AN ORGANIZED HEALTH CARE EDUCATION/TRAINING PROGRAM

## 2020-06-23 PROCEDURE — 85025 COMPLETE CBC W/AUTO DIFF WBC: CPT

## 2020-06-23 PROCEDURE — 83880 ASSAY OF NATRIURETIC PEPTIDE: CPT

## 2020-06-23 PROCEDURE — 97530 THERAPEUTIC ACTIVITIES: CPT

## 2020-06-23 PROCEDURE — 2700000000 HC OXYGEN THERAPY PER DAY

## 2020-06-23 PROCEDURE — 80053 COMPREHEN METABOLIC PANEL: CPT

## 2020-06-23 RX ADMIN — POTASSIUM CHLORIDE 10 MEQ: 750 TABLET, FILM COATED, EXTENDED RELEASE ORAL at 10:12

## 2020-06-23 RX ADMIN — SOTALOL HYDROCHLORIDE 40 MG: 80 TABLET ORAL at 10:11

## 2020-06-23 RX ADMIN — APIXABAN 2.5 MG: 2.5 TABLET, FILM COATED ORAL at 23:20

## 2020-06-23 RX ADMIN — DONEPEZIL HYDROCHLORIDE 10 MG: 10 TABLET, FILM COATED ORAL at 23:23

## 2020-06-23 RX ADMIN — CEFTRIAXONE 1 G: 1 INJECTION, POWDER, FOR SOLUTION INTRAMUSCULAR; INTRAVENOUS at 23:20

## 2020-06-23 RX ADMIN — FUROSEMIDE 40 MG: 40 TABLET ORAL at 10:12

## 2020-06-23 RX ADMIN — SODIUM CHLORIDE, PRESERVATIVE FREE 10 ML: 5 INJECTION INTRAVENOUS at 10:12

## 2020-06-23 RX ADMIN — ACETAMINOPHEN 650 MG: 325 TABLET ORAL at 23:19

## 2020-06-23 RX ADMIN — MEMANTINE 10 MG: 10 TABLET ORAL at 23:20

## 2020-06-23 RX ADMIN — SODIUM CHLORIDE, PRESERVATIVE FREE 10 ML: 5 INJECTION INTRAVENOUS at 23:23

## 2020-06-23 RX ADMIN — DOXYCYCLINE HYCLATE 100 MG: 100 TABLET, COATED ORAL at 10:12

## 2020-06-23 RX ADMIN — ACETAMINOPHEN 650 MG: 325 TABLET ORAL at 11:34

## 2020-06-23 RX ADMIN — SOTALOL HYDROCHLORIDE 40 MG: 80 TABLET ORAL at 23:20

## 2020-06-23 RX ADMIN — DOXYCYCLINE HYCLATE 100 MG: 100 TABLET, COATED ORAL at 23:20

## 2020-06-23 RX ADMIN — APIXABAN 2.5 MG: 2.5 TABLET, FILM COATED ORAL at 10:12

## 2020-06-23 RX ADMIN — TAMSULOSIN HYDROCHLORIDE 0.4 MG: 0.4 CAPSULE ORAL at 23:20

## 2020-06-23 ASSESSMENT — PAIN DESCRIPTION - FREQUENCY
FREQUENCY: CONTINUOUS

## 2020-06-23 ASSESSMENT — PAIN SCALES - GENERAL
PAINLEVEL_OUTOF10: 3
PAINLEVEL_OUTOF10: 3
PAINLEVEL_OUTOF10: 8
PAINLEVEL_OUTOF10: 0
PAINLEVEL_OUTOF10: 3

## 2020-06-23 ASSESSMENT — PAIN DESCRIPTION - PROGRESSION
CLINICAL_PROGRESSION: NOT CHANGED

## 2020-06-23 ASSESSMENT — PAIN DESCRIPTION - ORIENTATION
ORIENTATION: RIGHT;LEFT

## 2020-06-23 ASSESSMENT — PAIN DESCRIPTION - DESCRIPTORS
DESCRIPTORS: ACHING;DISCOMFORT
DESCRIPTORS: ACHING;DISCOMFORT
DESCRIPTORS: ACHING;BURNING

## 2020-06-23 ASSESSMENT — PAIN DESCRIPTION - LOCATION
LOCATION: LEG;FOOT
LOCATION: LEG;FOOT
LOCATION: FOOT

## 2020-06-23 ASSESSMENT — PAIN DESCRIPTION - ONSET
ONSET: ON-GOING

## 2020-06-23 ASSESSMENT — PAIN - FUNCTIONAL ASSESSMENT
PAIN_FUNCTIONAL_ASSESSMENT: PREVENTS OR INTERFERES SOME ACTIVE ACTIVITIES AND ADLS

## 2020-06-23 ASSESSMENT — PAIN DESCRIPTION - PAIN TYPE
TYPE: CHRONIC PAIN

## 2020-06-23 NOTE — PROGRESS NOTES
Occupational Therapy  . Occupational Therapy Treatment Note  Name: Sari Tellez MRN: [id number] :   1935   Date:  2020   Admission Date: 2020 Room:  [address] [place], [place] [address]A   Restrictions/Precautions:    General precautions; Fall Risk    Communication with other providers:  Per chart review and RACHAEL Curran Daily, pt is appropriate for therapeutic intervention. Subjective:  Patient states:  \"No, No, No, No!\" Pt refusing any therapeutic intervention. With rapport building and education for benefits of repositioning, pt agreeable to reposition in chair, continued to refuse any more participation, identifies pain as limiting factor. Pain:   Location, Type, Intensity (0/10 to 10/10):  5/10, BLE below knees    Objective:    Observation:  Pt received in full reclining position in bedside chair, slid down and in a poor position. Pt exhibits hearing impairment, requiring loud clear voice for communication, and exhibits decreased cognition for education. .  Objective Measures:  N/A    Treatment, including education:  Therapeutic Activity Training:   Therapeutic activity training was instructed today. Cues were given for safety, sequence, UE/LE placement, awareness, and balance. Activities performed today included following cues and performing partial sit<>stands and scooting for repositioning. Pt required Min A x2for partial sit to stand (refuses to perform full stand) + cues for repositioning further back in chair. Scooting: Pt followed cues and required CGA for posture to scoot back in chair. All therapeutic intervention performed c emphasis on BUE / trunk / BLE involvement in repositioning to inc strength, endurance and act tolerance for inc Indep c ADL tasks, functional transfers and in prep for functional mobility. Safety  Patient safely in bedside chair + alarm at end of session, with call light/phone in reach, and nursing aware. Telesitter activated.       Assessment / Impression:        Patient's

## 2020-06-23 NOTE — PROGRESS NOTES
INTERNAL MEDICINE PROGRESS NOTE        Michael Hinton   1935   Primary Care Physician:  Ame Steve MD  Admit Date: 6/19/2020     Subjective:   Patient is seen sleeping this AM and difficult to wake up but did eventually wake up. He denies any pain. He is able to recall Dr. Clayton Rico name. No fever.        Objective:   /82   Pulse 81   Temp 98 °F (36.7 °C) (Oral)   Resp 20   Ht 5' 10\" (1.778 m)   Wt 179 lb 4.8 oz (81.3 kg)   SpO2 94%   BMI 25.73 kg/m²    General appearance: alert, appears stated age and cooperative, confused  Head: Normocephalic, without obvious abnormality, atraumatic  Neck: no adenopathy and supple, symmetrical, trachea midline  Lungs: clear to auscultation bilaterally  Heart: regular rate and rhythm and S1, S2 normal  Abdomen: soft, non-tender; bowel sounds normal; no masses,  no organomegaly  Extremities: no clubbing, cyanosis or edema  Neurologic: Grossly normal    Data Review  Lab Results   Component Value Date     06/23/2020    K 4.5 06/23/2020     06/23/2020    CO2 27 06/23/2020    CREATININE 1.2 06/23/2020    BUN 24 (H) 06/23/2020    CALCIUM 9.3 06/23/2020     Lab Results   Component Value Date    WBC 8.2 06/23/2020    HGB 11.8 (L) 06/23/2020    HCT 37.7 (L) 06/23/2020    .8 (H) 06/23/2020     06/23/2020     INR/Prothrombin Time      Meds:    apixaban  2.5 mg Oral BID    donepezil  10 mg Oral Nightly    furosemide  40 mg Oral Daily    memantine  10 mg Oral Nightly    sotalol  40 mg Oral BID    tamsulosin  0.4 mg Oral Nightly    potassium chloride  10 mEq Oral Daily    sodium chloride flush  10 mL Intravenous 2 times per day    cefTRIAXone (ROCEPHIN) IV  1 g Intravenous Q24H    doxycycline hyclate  100 mg Oral BID     PRN Meds: sodium chloride flush, acetaminophen **OR** acetaminophen, polyethylene glycol, promethazine **OR** ondansetron    Assessment/Plan:   Patient Active Hospital Problem List:  Patient Active Problem List Diagnosis    PNA (pneumonia)     Acute Hypoxic Resp. Failure: improved on O2 2L NC. COVID Negative. +blood cx. Likely contaminant - repeat blood cx x2. Also elevated BNP. Pneumonia: cont ceftriaxone and doxycycline. Procal neg, wbc negative. Elevated BNP: repeat BNP improved. Continue lasix. CXR similar to previous. Recent Mechanical Fall: PT/OT recc SNF. Dementia: cont aricept and namenda. HTN: on BB.   BPH: on flomax. Chronic atrial fibrillation. Rate controlled on BB. On Eliquis. Hyponatremia: improved, now 139. Monitor. Plan:  Cont abx. Reviewed CXR. PT/OT reccomends SNF. Possible placement to [facility] on discharge. Monitor labs and patient condition. Electronically signed by Cindy Garcia PA-C on 6/23/2020 at 8:21 AM     I have independently evaluated and examined this patient today. I have reviewed radiologic and biochemical tests on this patient. Management Plan is developed mutually with KYLE Stock. I have reviewed above note and agree with assessment and plan.

## 2020-06-23 NOTE — DISCHARGE INSTR - COC
Continuity of Care Form    Patient Name: Michael Hinton   :  1935  MRN:  [id number]    Admit date:  2020  Discharge date:  2020    Code Status Order: DNR-CCA   Advance Directives:   Advance Care Flowsheet Documentation     Date/Time Healthcare Directive Type of Healthcare Directive Copy in [address] Agent's Name Healthcare Agent's Phone Number    [phone]  Yes, patient has an advance directive for healthcare treatment  Living will;Durable power of  for health care  No, copy requested from medical records  [address] [place] [address]  [phone]          Admitting Physician:  Shiraz Rebolledo MD  PCP: Ame Steve MD    Discharging Nurse: [address] [place] [address] Unit/Room#: 5885/6553-N  Discharging Unit Phone Number: [phone]    Emergency Contact:   Extended Emergency Contact Information  Primary Emergency Contact: Charlotte Mackenzie  Home Phone: [phone]  Relation: Spouse  Secondary Emergency Contact: [address] Phone: [phone]  Relation: Child    Past Surgical History:  History reviewed. No pertinent surgical history. Immunization History: There is no immunization history on file for this patient.     Active Problems:  Patient Active Problem List   Diagnosis Code    PNA (pneumonia) J18.9       Isolation/Infection:   Isolation          Droplet Plus        Patient Infection Status     Infection Onset Added Last Indicated Last Indicated By Review Planned Expiration Resolved Resolved By    None active    Resolved    COVID-19 Rule Out 20 Covid-19 Ambulatory (Ordered)   20 Rule-Out Test Resulted          Nurse Assessment:  Last Vital Signs: /82   Pulse 81   Temp 98 °F (36.7 °C) (Oral)   Resp 20   Ht 5' 10\" (1.778 m)   Wt 179 lb 4.8 oz (81.3 kg)   SpO2 94%   BMI 25.73 kg/m²     Last documented pain score (0-10 scale): Pain Level: 3  Last Weight:   Wt Readings from Last 1 Encounters:

## 2020-06-24 VITALS
TEMPERATURE: 98.1 F | BODY MASS INDEX: 23.65 KG/M2 | RESPIRATION RATE: 18 BRPM | OXYGEN SATURATION: 95 % | HEIGHT: 70 IN | SYSTOLIC BLOOD PRESSURE: 112 MMHG | HEART RATE: 94 BPM | DIASTOLIC BLOOD PRESSURE: 76 MMHG | WEIGHT: 165.2 LBS

## 2020-06-24 LAB
ANION GAP SERPL CALCULATED.3IONS-SCNC: 13 MMOL/L (ref 4–16)
BASOPHILS ABSOLUTE: 0.1 K/CU MM
BASOPHILS RELATIVE PERCENT: 0.7 % (ref 0–1)
BUN BLDV-MCNC: 31 MG/DL (ref 6–23)
CALCIUM SERPL-MCNC: 9.1 MG/DL (ref 8.3–10.6)
CHLORIDE BLD-SCNC: 99 MMOL/L (ref 99–110)
CO2: 23 MMOL/L (ref 21–32)
CREAT SERPL-MCNC: 1.1 MG/DL (ref 0.9–1.3)
DIFFERENTIAL TYPE: ABNORMAL
EOSINOPHILS ABSOLUTE: 0.2 K/CU MM
EOSINOPHILS RELATIVE PERCENT: 2.5 % (ref 0–3)
GFR AFRICAN AMERICAN: >60 ML/MIN/1.73M2
GFR NON-AFRICAN AMERICAN: >60 ML/MIN/1.73M2
GLUCOSE BLD-MCNC: 136 MG/DL (ref 70–99)
HCT VFR BLD CALC: 39 % (ref 42–52)
HEMOGLOBIN: 12.2 GM/DL (ref 13.5–18)
IMMATURE NEUTROPHIL %: 1.8 % (ref 0–0.43)
LYMPHOCYTES ABSOLUTE: 1.9 K/CU MM
LYMPHOCYTES RELATIVE PERCENT: 21.7 % (ref 24–44)
MCH RBC QN AUTO: 31.4 PG (ref 27–31)
MCHC RBC AUTO-ENTMCNC: 31.3 % (ref 32–36)
MCV RBC AUTO: 100.5 FL (ref 78–100)
MONOCYTES ABSOLUTE: 0.5 K/CU MM
MONOCYTES RELATIVE PERCENT: 6.1 % (ref 0–4)
NUCLEATED RBC %: 0.5 %
PDW BLD-RTO: 20.5 % (ref 11.7–14.9)
PLATELET # BLD: 396 K/CU MM (ref 140–440)
PMV BLD AUTO: 9.8 FL (ref 7.5–11.1)
POTASSIUM SERPL-SCNC: 4.3 MMOL/L (ref 3.5–5.1)
RBC # BLD: 3.88 M/CU MM (ref 4.6–6.2)
SEGMENTED NEUTROPHILS ABSOLUTE COUNT: 5.8 K/CU MM
SEGMENTED NEUTROPHILS RELATIVE PERCENT: 67.2 % (ref 36–66)
SODIUM BLD-SCNC: 135 MMOL/L (ref 135–145)
TOTAL IMMATURE NEUTOROPHIL: 0.16 K/CU MM
TOTAL NUCLEATED RBC: 0 K/CU MM
WBC # BLD: 8.7 K/CU MM (ref 4–10.5)

## 2020-06-24 PROCEDURE — 6370000000 HC RX 637 (ALT 250 FOR IP): Performed by: STUDENT IN AN ORGANIZED HEALTH CARE EDUCATION/TRAINING PROGRAM

## 2020-06-24 PROCEDURE — 80048 BASIC METABOLIC PNL TOTAL CA: CPT

## 2020-06-24 PROCEDURE — 6370000000 HC RX 637 (ALT 250 FOR IP): Performed by: INTERNAL MEDICINE

## 2020-06-24 PROCEDURE — 6370000000 HC RX 637 (ALT 250 FOR IP): Performed by: GENERAL PRACTICE

## 2020-06-24 PROCEDURE — 94761 N-INVAS EAR/PLS OXIMETRY MLT: CPT

## 2020-06-24 PROCEDURE — 36415 COLL VENOUS BLD VENIPUNCTURE: CPT

## 2020-06-24 PROCEDURE — 85025 COMPLETE CBC W/AUTO DIFF WBC: CPT

## 2020-06-24 RX ORDER — DOXYCYCLINE HYCLATE 100 MG
100 TABLET ORAL 2 TIMES DAILY
Qty: 10 TABLET | Refills: 0 | Status: SHIPPED | OUTPATIENT
Start: 2020-06-24 | End: 2020-06-29

## 2020-06-24 RX ORDER — CEFDINIR 300 MG/1
300 CAPSULE ORAL 2 TIMES DAILY
Qty: 10 CAPSULE | Refills: 0 | Status: SHIPPED | OUTPATIENT
Start: 2020-06-24 | End: 2020-06-29

## 2020-06-24 RX ORDER — GABAPENTIN 100 MG/1
200 CAPSULE ORAL ONCE
Status: COMPLETED | OUTPATIENT
Start: 2020-06-24 | End: 2020-06-24

## 2020-06-24 RX ORDER — GABAPENTIN 100 MG/1
200 CAPSULE ORAL 3 TIMES DAILY
Qty: 180 CAPSULE | Refills: 2 | Status: SHIPPED | OUTPATIENT
Start: 2020-06-24 | End: 2020-09-22

## 2020-06-24 RX ADMIN — FUROSEMIDE 40 MG: 40 TABLET ORAL at 09:56

## 2020-06-24 RX ADMIN — GABAPENTIN 200 MG: 100 CAPSULE ORAL at 01:04

## 2020-06-24 RX ADMIN — ACETAMINOPHEN 650 MG: 325 TABLET ORAL at 09:57

## 2020-06-24 RX ADMIN — APIXABAN 2.5 MG: 2.5 TABLET, FILM COATED ORAL at 09:57

## 2020-06-24 RX ADMIN — SOTALOL HYDROCHLORIDE 40 MG: 80 TABLET ORAL at 09:57

## 2020-06-24 RX ADMIN — POTASSIUM CHLORIDE 10 MEQ: 750 TABLET, FILM COATED, EXTENDED RELEASE ORAL at 09:56

## 2020-06-24 RX ADMIN — DOXYCYCLINE HYCLATE 100 MG: 100 TABLET, COATED ORAL at 09:56

## 2020-06-24 ASSESSMENT — PAIN DESCRIPTION - ORIENTATION: ORIENTATION: RIGHT;LEFT

## 2020-06-24 ASSESSMENT — PAIN SCALES - GENERAL
PAINLEVEL_OUTOF10: 5
PAINLEVEL_OUTOF10: 4

## 2020-06-24 ASSESSMENT — PAIN DESCRIPTION - PROGRESSION
CLINICAL_PROGRESSION: NOT CHANGED
CLINICAL_PROGRESSION: NOT CHANGED

## 2020-06-24 ASSESSMENT — PAIN DESCRIPTION - DESCRIPTORS: DESCRIPTORS: ACHING

## 2020-06-24 ASSESSMENT — PAIN DESCRIPTION - PAIN TYPE: TYPE: ACUTE PAIN

## 2020-06-24 ASSESSMENT — PAIN DESCRIPTION - FREQUENCY: FREQUENCY: INTERMITTENT

## 2020-06-24 ASSESSMENT — PAIN DESCRIPTION - ONSET: ONSET: ON-GOING

## 2020-06-24 ASSESSMENT — PAIN - FUNCTIONAL ASSESSMENT: PAIN_FUNCTIONAL_ASSESSMENT: ACTIVITIES ARE NOT PREVENTED

## 2020-06-24 ASSESSMENT — PAIN DESCRIPTION - LOCATION: LOCATION: LEG;FOOT

## 2020-06-24 NOTE — CARE COORDINATION
CM reviewed chart. D/C order in place. CM called Aydee Zapata pt approved. CM set up transportation w/ [facility] at [address] , [address] notified Aydee Zapata pt and pt's spouse Larry Majano. CM completed passr, d/c packet and faxed Rx. Hard copies of Rx in d/c packet. CM call Mayra Brewer and notified of transportation. Covid neg 6/19.   CM remains available as needed

## 2020-06-24 NOTE — PLAN OF CARE
Problem: Falls - Risk of:  Goal: Will remain free from falls  Description: Will remain free from falls  Outcome: Ongoing  Goal: Absence of physical injury  Description: Absence of physical injury  Outcome: Ongoing     Problem: Safety:  Goal: Free from accidental physical injury  Description: Free from accidental physical injury  Outcome: Ongoing  Goal: Free from intentional harm  Description: Free from intentional harm  Outcome: Ongoing  Goal: Ability to remain free from injury will improve  Description: Ability to remain free from injury will improve  Outcome: Ongoing     Problem: Daily Care:  Goal: Daily care needs are met  Description: Daily care needs are met  Outcome: Ongoing     Problem: Discharge Planning:  Goal: Patients continuum of care needs are met  Description: Patients continuum of care needs are met  Outcome: Ongoing     Problem: Self-Care:  Goal: Ability to participate in self-care as condition permits will improve  Description: Ability to participate in self-care as condition permits will improve  Outcome: Ongoing     Problem: Pain:  Goal: Pain level will decrease  Description: Pain level will decrease  Outcome: Ongoing  Goal: Control of acute pain  Description: Control of acute pain  Outcome: Ongoing  Goal: Control of chronic pain  Description: Control of chronic pain  Outcome: Ongoing

## 2020-06-26 ENCOUNTER — HOSPITAL ENCOUNTER (OUTPATIENT)
Age: 85
Setting detail: SPECIMEN
Discharge: HOME | End: 2020-06-26
Payer: COMMERCIAL

## 2020-06-26 LAB
ALBUMIN SERPL-MCNC: 3.4 GM/DL (ref 3.4–5)
ALP BLD-CCNC: 85 IU/L (ref 40–129)
ALT SERPL-CCNC: 28 U/L (ref 10–40)
ANION GAP SERPL CALCULATED.3IONS-SCNC: 16 MMOL/L (ref 4–16)
ANISOCYTOSIS: ABNORMAL
AST SERPL-CCNC: 25 IU/L (ref 15–37)
BANDED NEUTROPHILS ABSOLUTE COUNT: 0.6 K/CU MM
BANDED NEUTROPHILS RELATIVE PERCENT: 7 % (ref 5–11)
BILIRUB SERPL-MCNC: 1.3 MG/DL (ref 0–1)
BUN BLDV-MCNC: 26 MG/DL (ref 6–23)
CALCIUM SERPL-MCNC: 9.1 MG/DL (ref 8.3–10.6)
CHLORIDE BLD-SCNC: 97 MMOL/L (ref 99–110)
CO2: 22 MMOL/L (ref 21–32)
CREAT SERPL-MCNC: 1.1 MG/DL (ref 0.9–1.3)
CULTURE: NORMAL
CULTURE: NORMAL
DIFFERENTIAL TYPE: ABNORMAL
EOSINOPHILS ABSOLUTE: 0.1 K/CU MM
EOSINOPHILS RELATIVE PERCENT: 1 % (ref 0–3)
ESTIMATED AVERAGE GLUCOSE: 134 MG/DL
GFR AFRICAN AMERICAN: >60 ML/MIN/1.73M2
GFR NON-AFRICAN AMERICAN: >60 ML/MIN/1.73M2
GLUCOSE BLD-MCNC: 107 MG/DL (ref 70–99)
HBA1C MFR BLD: 6.3 % (ref 4.2–6.3)
HCT VFR BLD CALC: 38.1 % (ref 42–52)
HEMOGLOBIN: 11.9 GM/DL (ref 13.5–18)
LYMPHOCYTES ABSOLUTE: 1.6 K/CU MM
LYMPHOCYTES RELATIVE PERCENT: 19 % (ref 24–44)
Lab: NORMAL
Lab: NORMAL
MACROCYTES: ABNORMAL
MCH RBC QN AUTO: 31.6 PG (ref 27–31)
MCHC RBC AUTO-ENTMCNC: 31.2 % (ref 32–36)
MCV RBC AUTO: 101.3 FL (ref 78–100)
MONOCYTES ABSOLUTE: 0.5 K/CU MM
MONOCYTES RELATIVE PERCENT: 6 % (ref 0–4)
NUCLEATED RED BLOOD CELLS: 1
PDW BLD-RTO: 21.2 % (ref 11.7–14.9)
PLATELET # BLD: 375 K/CU MM (ref 140–440)
PLT MORPHOLOGY: ABNORMAL
PMV BLD AUTO: 10 FL (ref 7.5–11.1)
POLYCHROMASIA: ABNORMAL
POTASSIUM SERPL-SCNC: 4.2 MMOL/L (ref 3.5–5.1)
RBC # BLD: 3.76 M/CU MM (ref 4.6–6.2)
SEGMENTED NEUTROPHILS ABSOLUTE COUNT: 5.7 K/CU MM
SEGMENTED NEUTROPHILS RELATIVE PERCENT: 67 % (ref 36–66)
SODIUM BLD-SCNC: 135 MMOL/L (ref 135–145)
SPECIMEN: NORMAL
SPECIMEN: NORMAL
TOTAL PROTEIN: 6.7 GM/DL (ref 6.4–8.2)
TOXIC GRANULATION: PRESENT
WBC # BLD: 8.5 K/CU MM (ref 4–10.5)

## 2020-06-26 PROCEDURE — 36415 COLL VENOUS BLD VENIPUNCTURE: CPT

## 2020-06-26 PROCEDURE — 80053 COMPREHEN METABOLIC PANEL: CPT

## 2020-06-26 PROCEDURE — 85007 BL SMEAR W/DIFF WBC COUNT: CPT

## 2020-06-26 PROCEDURE — 85027 COMPLETE CBC AUTOMATED: CPT

## 2020-06-26 PROCEDURE — 83036 HEMOGLOBIN GLYCOSYLATED A1C: CPT

## 2020-07-02 ENCOUNTER — HOSPITAL ENCOUNTER (OUTPATIENT)
Age: 85
Setting detail: SPECIMEN
Discharge: HOME | End: 2020-07-02
Payer: COMMERCIAL

## 2020-07-02 LAB
ANION GAP SERPL CALCULATED.3IONS-SCNC: 9 MMOL/L (ref 4–16)
BUN BLDV-MCNC: 31 MG/DL (ref 6–23)
CALCIUM SERPL-MCNC: 9.3 MG/DL (ref 8.3–10.6)
CHLORIDE BLD-SCNC: 100 MMOL/L (ref 99–110)
CO2: 28 MMOL/L (ref 21–32)
CREAT SERPL-MCNC: 1.2 MG/DL (ref 0.9–1.3)
GFR AFRICAN AMERICAN: >60 ML/MIN/1.73M2
GFR NON-AFRICAN AMERICAN: 58 ML/MIN/1.73M2
GLUCOSE BLD-MCNC: 122 MG/DL (ref 70–99)
HCT VFR BLD CALC: 40.4 % (ref 42–52)
HEMOGLOBIN: 12.7 GM/DL (ref 13.5–18)
MCH RBC QN AUTO: 31.2 PG (ref 27–31)
MCHC RBC AUTO-ENTMCNC: 31.4 % (ref 32–36)
MCV RBC AUTO: 99.3 FL (ref 78–100)
PDW BLD-RTO: 20.8 % (ref 11.7–14.9)
PLATELET # BLD: 383 K/CU MM (ref 140–440)
PMV BLD AUTO: 9.8 FL (ref 7.5–11.1)
POTASSIUM SERPL-SCNC: 4.6 MMOL/L (ref 3.5–5.1)
PRO-BNP: 296.5 PG/ML
RBC # BLD: 4.07 M/CU MM (ref 4.6–6.2)
SODIUM BLD-SCNC: 137 MMOL/L (ref 135–145)
WBC # BLD: 7.4 K/CU MM (ref 4–10.5)

## 2020-07-02 PROCEDURE — 36415 COLL VENOUS BLD VENIPUNCTURE: CPT

## 2020-07-02 PROCEDURE — 83880 ASSAY OF NATRIURETIC PEPTIDE: CPT

## 2020-07-02 PROCEDURE — 85027 COMPLETE CBC AUTOMATED: CPT

## 2020-07-02 PROCEDURE — 80048 BASIC METABOLIC PNL TOTAL CA: CPT

## 2020-07-06 ENCOUNTER — APPOINTMENT (OUTPATIENT)
Dept: CT IMAGING | Age: 85
DRG: 177 | End: 2020-07-06
Payer: COMMERCIAL

## 2020-07-06 ENCOUNTER — HOSPITAL ENCOUNTER (INPATIENT)
Age: 85
LOS: 4 days | Discharge: NURSING, REHAB OR LONG-TERM CARE | DRG: 177 | End: 2020-07-10
Attending: EMERGENCY MEDICINE | Admitting: HOSPITALIST
Payer: COMMERCIAL

## 2020-07-06 ENCOUNTER — APPOINTMENT (OUTPATIENT)
Dept: GENERAL RADIOLOGY | Age: 85
DRG: 177 | End: 2020-07-06
Payer: COMMERCIAL

## 2020-07-06 PROBLEM — A41.9 SEPSIS (HCC): Status: ACTIVE | Noted: 2020-07-06

## 2020-07-06 PROBLEM — G93.40 ACUTE ENCEPHALOPATHY: Status: ACTIVE | Noted: 2020-07-06

## 2020-07-06 PROBLEM — G93.41 ACUTE METABOLIC ENCEPHALOPATHY: Status: ACTIVE | Noted: 2020-07-06

## 2020-07-06 PROBLEM — J18.9 HCAP (HEALTHCARE-ASSOCIATED PNEUMONIA): Status: ACTIVE | Noted: 2020-07-06

## 2020-07-06 PROBLEM — I48.91 ATRIAL FIBRILLATION WITH RVR (HCC): Status: ACTIVE | Noted: 2020-07-06

## 2020-07-06 PROBLEM — Z20.822 SUSPECTED COVID-19 VIRUS INFECTION: Status: ACTIVE | Noted: 2020-07-06

## 2020-07-06 LAB
ADENOVIRUS DETECTION BY PCR: NOT DETECTED
AMMONIA: 30 UMOL/L (ref 16–60)
APTT: 31.3 SECONDS (ref 25.1–37.1)
ATYPICAL LYMPHOCYTE ABSOLUTE COUNT: ABNORMAL
BACTERIA: NEGATIVE /HPF
BANDED NEUTROPHILS ABSOLUTE COUNT: 0.11 K/CU MM
BANDED NEUTROPHILS RELATIVE PERCENT: 1 % (ref 5–11)
BASE EXCESS: 5 (ref 0–3.3)
BILIRUBIN URINE: NEGATIVE MG/DL
BLOOD, URINE: ABNORMAL
BORDETELLA PARAPERTUSSIS BY PCR: NOT DETECTED
BORDETELLA PERTUSSIS PCR: NOT DETECTED
CHLAMYDOPHILA PNEUMONIA PCR: NOT DETECTED
CLARITY: ABNORMAL
COLOR: YELLOW
COMMENT: ABNORMAL
CORONAVIRUS 229E PCR: NOT DETECTED
CORONAVIRUS HKU1 PCR: NOT DETECTED
CORONAVIRUS NL63 PCR: NOT DETECTED
CORONAVIRUS OC43 PCR: NOT DETECTED
DIFFERENTIAL TYPE: ABNORMAL
GLUCOSE BLD-MCNC: 195 MG/DL (ref 70–99)
GLUCOSE BLD-MCNC: 217 MG/DL
GLUCOSE BLD-MCNC: 217 MG/DL (ref 70–99)
GLUCOSE, URINE: NEGATIVE MG/DL
HCO3 VENOUS: 21.1 MMOL/L (ref 19–25)
HCT VFR BLD CALC: 49.6 % (ref 42–52)
HEMOGLOBIN: 15.9 GM/DL (ref 13.5–18)
HUMAN METAPNEUMOVIRUS PCR: NOT DETECTED
HYALINE CASTS: >20 /LPF
INFLUENZA A BY PCR: NOT DETECTED
INFLUENZA A H1 (2009) PCR: NOT DETECTED
INFLUENZA A H1 PANDEMIC PCR: NOT DETECTED
INFLUENZA A H3 PCR: NOT DETECTED
INFLUENZA B BY PCR: NOT DETECTED
INR BLD: 1.28 INDEX
KETONES, URINE: NEGATIVE MG/DL
LACTATE: 5.9 MMOL/L (ref 0.4–2)
LEUKOCYTE ESTERASE, URINE: NEGATIVE
LYMPHOCYTES ABSOLUTE: 2.7 K/CU MM
LYMPHOCYTES RELATIVE PERCENT: 25 % (ref 24–44)
MCH RBC QN AUTO: 32.4 PG (ref 27–31)
MCHC RBC AUTO-ENTMCNC: 32.1 % (ref 32–36)
MCV RBC AUTO: 101.2 FL (ref 78–100)
MONOCYTES ABSOLUTE: 0.6 K/CU MM
MONOCYTES RELATIVE PERCENT: 6 % (ref 0–4)
MUCUS: ABNORMAL HPF
MYCOPLASMA PNEUMONIAE PCR: NOT DETECTED
NITRITE URINE, QUANTITATIVE: NEGATIVE
NUCLEATED RED BLOOD CELLS: 3
O2 SAT, VEN: 75.7 % (ref 50–70)
PARAINFLUENZA 1 PCR: NOT DETECTED
PARAINFLUENZA 2 PCR: NOT DETECTED
PARAINFLUENZA 3 PCR: NOT DETECTED
PARAINFLUENZA 4 PCR: NOT DETECTED
PCO2, VEN: 42 MMHG (ref 38–52)
PDW BLD-RTO: 22 % (ref 11.7–14.9)
PH VENOUS: 7.31 (ref 7.32–7.42)
PH, URINE: 5 (ref 5–8)
PLATELET # BLD: 443 K/CU MM (ref 140–440)
PMV BLD AUTO: 10.7 FL (ref 7.5–11.1)
PO2, VEN: 42 MMHG (ref 28–48)
PROTEIN UA: 100 MG/DL
PROTHROMBIN TIME: 15.5 SECONDS (ref 11.7–14.5)
RBC # BLD: 4.9 M/CU MM (ref 4.6–6.2)
RBC # BLD: ABNORMAL 10*6/UL
RBC URINE: 614 /HPF (ref 0–3)
REASON FOR REJECTION: NORMAL
REASON FOR REJECTION: NORMAL
REJECTED TEST: NORMAL
REJECTED TEST: NORMAL
RHINOVIRUS ENTEROVIRUS PCR: NOT DETECTED
RSV PCR: NOT DETECTED
SEGMENTED NEUTROPHILS ABSOLUTE COUNT: 7.3 K/CU MM
SEGMENTED NEUTROPHILS RELATIVE PERCENT: 68 % (ref 36–66)
SPECIFIC GRAVITY UA: 1.02 (ref 1–1.03)
TRICHOMONAS: ABNORMAL /HPF
UROBILINOGEN, URINE: NORMAL MG/DL (ref 0.2–1)
WBC # BLD: 10.7 K/CU MM (ref 4–10.5)
WBC UA: ABNORMAL /HPF (ref 0–2)

## 2020-07-06 PROCEDURE — 2580000003 HC RX 258: Performed by: NURSE PRACTITIONER

## 2020-07-06 PROCEDURE — 85730 THROMBOPLASTIN TIME PARTIAL: CPT

## 2020-07-06 PROCEDURE — 2580000003 HC RX 258: Performed by: EMERGENCY MEDICINE

## 2020-07-06 PROCEDURE — 82805 BLOOD GASES W/O2 SATURATION: CPT

## 2020-07-06 PROCEDURE — 87486 CHLMYD PNEUM DNA AMP PROBE: CPT

## 2020-07-06 PROCEDURE — 83605 ASSAY OF LACTIC ACID: CPT

## 2020-07-06 PROCEDURE — 87633 RESP VIRUS 12-25 TARGETS: CPT

## 2020-07-06 PROCEDURE — 71045 X-RAY EXAM CHEST 1 VIEW: CPT

## 2020-07-06 PROCEDURE — 99285 EMERGENCY DEPT VISIT HI MDM: CPT

## 2020-07-06 PROCEDURE — 85610 PROTHROMBIN TIME: CPT

## 2020-07-06 PROCEDURE — 87899 AGENT NOS ASSAY W/OPTIC: CPT

## 2020-07-06 PROCEDURE — 6360000002 HC RX W HCPCS: Performed by: EMERGENCY MEDICINE

## 2020-07-06 PROCEDURE — 81001 URINALYSIS AUTO W/SCOPE: CPT

## 2020-07-06 PROCEDURE — 83880 ASSAY OF NATRIURETIC PEPTIDE: CPT

## 2020-07-06 PROCEDURE — 85027 COMPLETE CBC AUTOMATED: CPT

## 2020-07-06 PROCEDURE — 84484 ASSAY OF TROPONIN QUANT: CPT

## 2020-07-06 PROCEDURE — 96365 THER/PROPH/DIAG IV INF INIT: CPT

## 2020-07-06 PROCEDURE — 93005 ELECTROCARDIOGRAM TRACING: CPT | Performed by: EMERGENCY MEDICINE

## 2020-07-06 PROCEDURE — 87798 DETECT AGENT NOS DNA AMP: CPT

## 2020-07-06 PROCEDURE — 85007 BL SMEAR W/DIFF WBC COUNT: CPT

## 2020-07-06 PROCEDURE — 96368 THER/DIAG CONCURRENT INF: CPT

## 2020-07-06 PROCEDURE — 83690 ASSAY OF LIPASE: CPT

## 2020-07-06 PROCEDURE — 2500000003 HC RX 250 WO HCPCS: Performed by: EMERGENCY MEDICINE

## 2020-07-06 PROCEDURE — 82962 GLUCOSE BLOOD TEST: CPT

## 2020-07-06 PROCEDURE — 36415 COLL VENOUS BLD VENIPUNCTURE: CPT

## 2020-07-06 PROCEDURE — 93010 ELECTROCARDIOGRAM REPORT: CPT | Performed by: INTERNAL MEDICINE

## 2020-07-06 PROCEDURE — 96375 TX/PRO/DX INJ NEW DRUG ADDON: CPT

## 2020-07-06 PROCEDURE — 96366 THER/PROPH/DIAG IV INF ADDON: CPT

## 2020-07-06 PROCEDURE — 87086 URINE CULTURE/COLONY COUNT: CPT

## 2020-07-06 PROCEDURE — 74176 CT ABD & PELVIS W/O CONTRAST: CPT

## 2020-07-06 PROCEDURE — G0378 HOSPITAL OBSERVATION PER HR: HCPCS

## 2020-07-06 PROCEDURE — 87040 BLOOD CULTURE FOR BACTERIA: CPT

## 2020-07-06 PROCEDURE — U0002 COVID-19 LAB TEST NON-CDC: HCPCS

## 2020-07-06 PROCEDURE — 6360000002 HC RX W HCPCS: Performed by: HOSPITALIST

## 2020-07-06 PROCEDURE — 96376 TX/PRO/DX INJ SAME DRUG ADON: CPT

## 2020-07-06 PROCEDURE — 2060000000 HC ICU INTERMEDIATE R&B

## 2020-07-06 PROCEDURE — 83735 ASSAY OF MAGNESIUM: CPT

## 2020-07-06 PROCEDURE — 84443 ASSAY THYROID STIM HORMONE: CPT

## 2020-07-06 PROCEDURE — 96361 HYDRATE IV INFUSION ADD-ON: CPT

## 2020-07-06 PROCEDURE — 82550 ASSAY OF CK (CPK): CPT

## 2020-07-06 PROCEDURE — 80053 COMPREHEN METABOLIC PANEL: CPT

## 2020-07-06 PROCEDURE — 87581 M.PNEUMON DNA AMP PROBE: CPT

## 2020-07-06 PROCEDURE — 87449 NOS EACH ORGANISM AG IA: CPT

## 2020-07-06 PROCEDURE — 87081 CULTURE SCREEN ONLY: CPT

## 2020-07-06 PROCEDURE — 70450 CT HEAD/BRAIN W/O DYE: CPT

## 2020-07-06 PROCEDURE — 82140 ASSAY OF AMMONIA: CPT

## 2020-07-06 RX ORDER — ACETAMINOPHEN 650 MG/1
650 SUPPOSITORY RECTAL EVERY 6 HOURS PRN
Status: DISCONTINUED | OUTPATIENT
Start: 2020-07-06 | End: 2020-07-10 | Stop reason: HOSPADM

## 2020-07-06 RX ORDER — POTASSIUM CHLORIDE 20 MEQ/1
10 TABLET, EXTENDED RELEASE ORAL EVERY MORNING
Status: DISCONTINUED | OUTPATIENT
Start: 2020-07-07 | End: 2020-07-07

## 2020-07-06 RX ORDER — NICOTINE POLACRILEX 4 MG
15 LOZENGE BUCCAL PRN
Status: DISCONTINUED | OUTPATIENT
Start: 2020-07-06 | End: 2020-07-10 | Stop reason: HOSPADM

## 2020-07-06 RX ORDER — SODIUM CHLORIDE 9 MG/ML
INJECTION, SOLUTION INTRAVENOUS CONTINUOUS
Status: ACTIVE | OUTPATIENT
Start: 2020-07-06 | End: 2020-07-07

## 2020-07-06 RX ORDER — MEMANTINE HYDROCHLORIDE 10 MG/1
10 TABLET ORAL 2 TIMES DAILY
Status: DISCONTINUED | OUTPATIENT
Start: 2020-07-06 | End: 2020-07-10 | Stop reason: HOSPADM

## 2020-07-06 RX ORDER — SODIUM CHLORIDE 0.9 % (FLUSH) 0.9 %
10 SYRINGE (ML) INJECTION EVERY 12 HOURS SCHEDULED
Status: DISCONTINUED | OUTPATIENT
Start: 2020-07-06 | End: 2020-07-10 | Stop reason: HOSPADM

## 2020-07-06 RX ORDER — VANCOMYCIN HYDROCHLORIDE 1 G/200ML
15 INJECTION, SOLUTION INTRAVENOUS EVERY 12 HOURS
Status: DISCONTINUED | OUTPATIENT
Start: 2020-07-06 | End: 2020-07-06 | Stop reason: DRUGHIGH

## 2020-07-06 RX ORDER — 0.9 % SODIUM CHLORIDE 0.9 %
1000 INTRAVENOUS SOLUTION INTRAVENOUS ONCE
Status: COMPLETED | OUTPATIENT
Start: 2020-07-06 | End: 2020-07-06

## 2020-07-06 RX ORDER — M-VIT,TX,IRON,MINS/CALC/FOLIC 27MG-0.4MG
1 TABLET ORAL DAILY
Status: DISCONTINUED | OUTPATIENT
Start: 2020-07-07 | End: 2020-07-10 | Stop reason: HOSPADM

## 2020-07-06 RX ORDER — ASPIRIN 81 MG/1
81 TABLET, CHEWABLE ORAL DAILY
Status: DISCONTINUED | OUTPATIENT
Start: 2020-07-06 | End: 2020-07-10 | Stop reason: HOSPADM

## 2020-07-06 RX ORDER — DEXTROSE MONOHYDRATE 25 G/50ML
12.5 INJECTION, SOLUTION INTRAVENOUS PRN
Status: DISCONTINUED | OUTPATIENT
Start: 2020-07-06 | End: 2020-07-10 | Stop reason: HOSPADM

## 2020-07-06 RX ORDER — FUROSEMIDE 40 MG/1
40 TABLET ORAL EVERY MORNING
Status: DISCONTINUED | OUTPATIENT
Start: 2020-07-07 | End: 2020-07-07

## 2020-07-06 RX ORDER — SODIUM CHLORIDE 0.9 % (FLUSH) 0.9 %
10 SYRINGE (ML) INJECTION PRN
Status: DISCONTINUED | OUTPATIENT
Start: 2020-07-06 | End: 2020-07-10 | Stop reason: HOSPADM

## 2020-07-06 RX ORDER — SOTALOL HYDROCHLORIDE 80 MG/1
40 TABLET ORAL 2 TIMES DAILY
Status: DISCONTINUED | OUTPATIENT
Start: 2020-07-06 | End: 2020-07-08

## 2020-07-06 RX ORDER — TAMSULOSIN HYDROCHLORIDE 0.4 MG/1
0.4 CAPSULE ORAL NIGHTLY
Status: DISCONTINUED | OUTPATIENT
Start: 2020-07-06 | End: 2020-07-10 | Stop reason: HOSPADM

## 2020-07-06 RX ORDER — ACETAMINOPHEN 325 MG/1
650 TABLET ORAL EVERY 6 HOURS PRN
Status: DISCONTINUED | OUTPATIENT
Start: 2020-07-06 | End: 2020-07-10 | Stop reason: HOSPADM

## 2020-07-06 RX ORDER — SODIUM CHLORIDE 0.9 % (FLUSH) 0.9 %
10 SYRINGE (ML) INJECTION PRN
Status: DISCONTINUED | OUTPATIENT
Start: 2020-07-06 | End: 2020-07-06 | Stop reason: SDUPTHER

## 2020-07-06 RX ORDER — DICYCLOMINE HYDROCHLORIDE 10 MG/1
10 CAPSULE ORAL
Status: DISCONTINUED | OUTPATIENT
Start: 2020-07-06 | End: 2020-07-10 | Stop reason: HOSPADM

## 2020-07-06 RX ORDER — PANTOPRAZOLE SODIUM 40 MG/1
40 TABLET, DELAYED RELEASE ORAL
Status: DISCONTINUED | OUTPATIENT
Start: 2020-07-07 | End: 2020-07-10 | Stop reason: HOSPADM

## 2020-07-06 RX ORDER — DONEPEZIL HYDROCHLORIDE 10 MG/1
10 TABLET, FILM COATED ORAL NIGHTLY
Status: DISCONTINUED | OUTPATIENT
Start: 2020-07-06 | End: 2020-07-10 | Stop reason: HOSPADM

## 2020-07-06 RX ORDER — MEMANTINE HYDROCHLORIDE 10 MG/1
10 TABLET ORAL NIGHTLY
Status: DISCONTINUED | OUTPATIENT
Start: 2020-07-06 | End: 2020-07-07

## 2020-07-06 RX ORDER — DEXTROSE MONOHYDRATE 50 MG/ML
100 INJECTION, SOLUTION INTRAVENOUS PRN
Status: DISCONTINUED | OUTPATIENT
Start: 2020-07-06 | End: 2020-07-10 | Stop reason: HOSPADM

## 2020-07-06 RX ORDER — SODIUM CHLORIDE 0.9 % (FLUSH) 0.9 %
10 SYRINGE (ML) INJECTION EVERY 12 HOURS SCHEDULED
Status: DISCONTINUED | OUTPATIENT
Start: 2020-07-06 | End: 2020-07-06 | Stop reason: SDUPTHER

## 2020-07-06 RX ORDER — DILTIAZEM HYDROCHLORIDE 5 MG/ML
10 INJECTION INTRAVENOUS ONCE
Status: COMPLETED | OUTPATIENT
Start: 2020-07-06 | End: 2020-07-06

## 2020-07-06 RX ORDER — DIGOXIN 0.25 MG/ML
125 INJECTION INTRAMUSCULAR; INTRAVENOUS ONCE
Status: COMPLETED | OUTPATIENT
Start: 2020-07-06 | End: 2020-07-06

## 2020-07-06 RX ORDER — CHOLESTYRAMINE LIGHT 4 G/5.7G
4 POWDER, FOR SUSPENSION ORAL 2 TIMES DAILY
Status: DISCONTINUED | OUTPATIENT
Start: 2020-07-06 | End: 2020-07-07

## 2020-07-06 RX ADMIN — DEXTROSE MONOHYDRATE 5 MG/HR: 50 INJECTION, SOLUTION INTRAVENOUS at 16:05

## 2020-07-06 RX ADMIN — SODIUM CHLORIDE 1000 ML: 9 INJECTION, SOLUTION INTRAVENOUS at 14:38

## 2020-07-06 RX ADMIN — VANCOMYCIN HYDROCHLORIDE 1500 MG: 5 INJECTION, POWDER, LYOPHILIZED, FOR SOLUTION INTRAVENOUS at 16:38

## 2020-07-06 RX ADMIN — SODIUM CHLORIDE 1000 ML: 9 INJECTION, SOLUTION INTRAVENOUS at 14:35

## 2020-07-06 RX ADMIN — DEXTROSE MONOHYDRATE 10 MG/HR: 50 INJECTION, SOLUTION INTRAVENOUS at 16:41

## 2020-07-06 RX ADMIN — DILTIAZEM HYDROCHLORIDE 10 MG: 5 INJECTION INTRAVENOUS at 14:35

## 2020-07-06 RX ADMIN — SODIUM CHLORIDE, PRESERVATIVE FREE 10 ML: 5 INJECTION INTRAVENOUS at 20:09

## 2020-07-06 RX ADMIN — SODIUM CHLORIDE: 9 INJECTION, SOLUTION INTRAVENOUS at 18:35

## 2020-07-06 RX ADMIN — DIGOXIN 125 MCG: 0.25 INJECTION INTRAMUSCULAR; INTRAVENOUS at 20:10

## 2020-07-06 RX ADMIN — CEFEPIME HYDROCHLORIDE 2 G: 2 INJECTION, POWDER, FOR SOLUTION INTRAVENOUS at 16:07

## 2020-07-06 ASSESSMENT — PAIN SCALES - PAIN ASSESSMENT IN ADVANCED DEMENTIA (PAINAD)
FACIALEXPRESSION: 1
BODYLANGUAGE: 1
BODYLANGUAGE: 1
FACIALEXPRESSION: 1
NEGVOCALIZATION: 1
NEGVOCALIZATION: 1
BREATHING: 0
BODYLANGUAGE: 1
TOTALSCORE: 4
BREATHING: 0
FACIALEXPRESSION: 1
CONSOLABILITY: 1
BREATHING: 0
FACIALEXPRESSION: 1
TOTALSCORE: 4
TOTALSCORE: 2
FACIALEXPRESSION: 1
NEGVOCALIZATION: 1
NEGVOCALIZATION: 1
TOTALSCORE: 4
CONSOLABILITY: 1
TOTALSCORE: 4
NEGVOCALIZATION: 1
CONSOLABILITY: 1
BREATHING: 0
CONSOLABILITY: 1
BREATHING: 0
TOTALSCORE: 4
NEGVOCALIZATION: 1
FACIALEXPRESSION: 1
FACIALEXPRESSION: 1
CONSOLABILITY: 1
BODYLANGUAGE: 0
BREATHING: 0
BODYLANGUAGE: 1
BODYLANGUAGE: 1
BREATHING: 0
BODYLANGUAGE: 1
BODYLANGUAGE: 1
CONSOLABILITY: 1
CONSOLABILITY: 0
TOTALSCORE: 4
NEGVOCALIZATION: 1
FACIALEXPRESSION: 1
NEGVOCALIZATION: 1
BREATHING: 0
CONSOLABILITY: 1
TOTALSCORE: 4

## 2020-07-06 ASSESSMENT — PAIN SCALES - GENERAL
PAINLEVEL_OUTOF10: 2
PAINLEVEL_OUTOF10: 4

## 2020-07-06 NOTE — ED NOTES
Report called to Aubree Lindquist RN on 2E. All questions answered. Patient in a gown and dry at this time.        Cate Chang RN  07/06/20 [id number]

## 2020-07-06 NOTE — ED NOTES
Patient report tried to call to 2E. Ask to give Eva Simmons RN about 5-10 minutes because she's going to get blood. Will call back Eva Simmons.      Alaina Dubois RN  07/06/20 0032

## 2020-07-06 NOTE — ED NOTES
Bed: 03TR-03  Expected date:   Expected time:   Means of arrival:   Comments:  Sarah Ríos RN  07/06/20 4483

## 2020-07-06 NOTE — PROGRESS NOTES
Spoke with patients wife, Miesha Peabody about DNR status. Spouse states that patient wants comfort care only. Patient does not wish to be intubated or to have any life saving measures. Dr. Heena Wellington also spoke with spouse and explained difference in DNR CC versus DNR CCA.  Spouse stated again that patient wishes to be a DNR CC.
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
## 2020-07-06 NOTE — CARE COORDINATION
CM reviewed chart. Patient identified as potential readmission. Last admission 6/19/20  For weakness, falls, Hypoxia and elevated BNP. Patient had been discharged to [facility]. Patient here today with worsening altered mental status nonverbal at this point. Last normal yesterday found this morning like this. Patient reportedly in the hospital recently for pneumonia tested negative for COVID. Patient is getting admitted today due to elevated lactate, pneumonia, and possible effusion. Patient is requiring readmission and there were no alternatives to admission to explore at this time.

## 2020-07-06 NOTE — ED NOTES
Elex Sake (wife)  [phone] (cell phone)  Home [phone]     Makayla Bardales, RN  07/06/20 [address], RN  07/06/20 9474

## 2020-07-06 NOTE — H&P
History and Physical  BRETT Roche-BC   Internal Medicine Hospitalist      Name:  Traci Ramon /Age/Sex: 1935  (80 y.o. male)   MRN & CSN:  [id number] & [id number] Admission Date/Time: 2020  2:08 PM   Location:  03/03TR-03 PCP: Yuridia Alva MD       Hospital Day: 1      Supervising Physician: Dr. Becky Rousseau      Chief Complaint: Altered Mental Status     Assessment and Plan:   Traci Ramon is a 80 y.o. male who presents with Acute encephalopathy     Acute encephalopathy - could be d/t worsening dementia vs HCAP vs viral infection.  HCAP (healthcare-associated pneumonia) - as per CXR, was recently discharged here.  Suspected COVID-19 virus infection - was tested COVID last 20 which is negative, no fever, lactate 5.9         - CT head non-acute         - admit inpatient to COVID unit, telemetry monitoring           - empiric abx: Cefepime + Vanc (pharm to dose)         - pulse ox monitoring         - Neuro, NV checks         - orthostatic BP and pulse         - series Lactate         - c/w IVF for 10 hrs         - NPO, need swallow evaluation, then adv diet         - pending COVID testing, r/o started in ED         - start Droplet plus isolation          - pending COVID panel including procalcitonin, CRP, D-dimer         - pending cultures, urine Legionella, Strep antigen, Resp PCR         - pending CT abd         - CCM for Dementia: Aricept, Namenda          Atrial fibrillation with RVR - known h/o Afib, Last Echo EF50-55%, CHADs-VAS score-4, on Eliquis. - consult cardiology, Dr. Laura Zapata         - c/w Cardizem drip-started in ED         - c/w Betapace, Eliquis         - pending TSH, T4, Troponin       DM type 2 - Last A1C 6.3         - Sliding scale         - monitor accucheck         - diabetic diet       Chronic Illnesses: will continue current home medications unless contraindicated by above plan and assessment.           - CAD - c/w ASA, Sotalol - hyperlipidemia         - hypertension         - arthritis     Current diagnosis and plan of management discussed with the patient at the time of admission in lay language who agree to the above plan and disposition of admission for further care. All concerns and questions addressed. Patient assessment and plan in conjunction with supervising physician - Dr. Magdi Ordonez  NPO effective now for swallow eval, adv diet after swallow eval   DVT Prophylaxis [] Lovenox, []  Heparin, [] SCDs, [] Ambulation  [] Long term AC  Patient is on Eliquis. GI Prophylaxis [x] PPI,  [] H2 Blocker,  [] Carafate,  [] Diet,  [] No GI prophylaxis, N/A: patient is not under significant medical stress, non-ICU or is receiving a diet/tube feeds   Code Status DNR-CCA   Disposition Patient requires continued admission due to Acute encephalopathy. Discharge Plan:  Patient plans to return home upon discharge after seen by case management team.   MDM [] Low, [] Moderate,[x]  High  Patient's risk as above due to:      [x] One or more chronic illnesses with mild exacerbation progression      [] Two or more stable chronic illnesses      [x] Undiagnosed new problem with uncertain prognosis      [] Elective major surgery      []Prescription drug management     History of Present Illness:     Principal Problem: Acute encephalopathy  Maira Owusu is a 80 y.o. male with past medical history of Dementia, Afib on Eliquis, CAD, DM type 2, hyperlipidemia, and hypertension presented to the ED for complaints of altered mental status. Patient is presently awake, alert, confused, only oriented to his name, and not answering any questions, could be with severe hard of hearing, unable to provide history of present illness but moving all extremities and follows commands. No family member at bedside. Per chart reviewed, patient was recently admitted here for pneumonia, ested for COVID (6/21/20) which is negative.  Per ED reports, patient is in Afib with RVR upon arrival. Appears not on pain, no respiratory distress noted while on room air with O2 sat 95%. COVID19 in process, started in ED. Upon interview, the patient provided the history as above. ED Course: Discussed case with ED physician prior to admission. ROS: Ten point ROS reviewed and negative, unless as noted above per HPI. Objective:   No intake or output data in the 24 hours ending 07/06/20 1701     Vitals: Temp/Oral 98.6, RR 19  Vitals:    07/06/20 1619 07/06/20 1642 07/06/20 1654 07/06/20 1658   BP:  137/89     Pulse: 127 132  121   Resp:       Temp:       TempSrc:       SpO2: 94% 95% 93% 94%   Weight:       Height:         Physical Exam: 07/06/20    GEN  -Awake, alert, appearing confused, Frail, elderly male, lying in bed, cooperative but unable to give adequate history. Appears given age. EYES -Pupils are equally round. No vision changes. No scleral erythema, discharge, or conjunctivitis. HENT -MM are moist. Oral pharynx without exudates, no evidence of thrush. [place]. NECK -Supple, no apparent thyromegaly or masses. RESP -LS diminished bilateral with scattered rhonchi, no wheezes, no rales. Symmetric chest movement. No respiratory distress noted. C/V  -S1/S2 auscultated. RRR without appreciable M/R/G. No JVD or carotid bruits. Peripheral pulses equal bilaterally and palpable. Peripheral pulses equal bilaterally and palpable. No peripheral edema. No reproducible chest wall tenderness. GI  -Abdomen is soft, round, non-distended, no significant tenderness. No masses or guarding. + BS in all quadrants. Rectal exam deferred.   -Jamison catheter is not present. LYMPH  -No palpable cervical lymphadenopathy and no hepatosplenomegaly. No petechiae or ecchymoses. MS  -B/L extremities weak muscles strength. Full movements. No gross joint deformities. No swelling, intact sensation symmetrical.   SKIN  -Normal coloration, warm, dry. No open wounds or ulcers.   NEURO  -CN 2-12 appear grossly intact, normal speech, no lateralizing weakness. PSYC  -Awake, alert, oriented only to self. Limited verbal interaction. Past Medical History:      Past Medical History:   Diagnosis Date    A-fib [facility])     per spouse    Anesthesia complication     becomes combative and confused s/p anesthesia. Needed a strait jacket s/p back surgery.  Arthritis     hands, back    Atrial fibrillation (HCC)     Back pain     Blood transfusion     CAD (coronary artery disease)     Cancer (HCC)     Top of head-frozen off    Dementia ([facility]     Dementia ([facility] [facility]     per spouse    Diabetes mellitus ([facility] [address] [facility]     Histoplasmosis     x2 last time in 2005    History of blood transfusion     Hyperlipidemia     Hypertension     Nerve damage     bilat feet s/p back surgery     Past Surgery History:  Patient  has a past surgical history that includes back surgery (Last done 2007); Skin graft (1950's); Tonsillectomy (14 years old); Colonoscopy (2009); Appendectomy; other surgical history (06 13 2013); Mitral valve surgery; and Cholecystectomy (12/15/14). Social History:    FAM HX: Assessed: family history includes Cancer in his brother; Heart Disease in his brother, mother, and sister; Other in his daughter, father, and sister.   Soc HX:   Social History     Socioeconomic History    Marital status:      Spouse name: None    Number of children: 4    Years of education: None    Highest education level: None   Occupational History    None   Social Needs    Financial resource strain: None    Food insecurity     Worry: None     Inability: None    Transportation needs     Medical: None     Non-medical: None   Tobacco Use    Smoking status: Never Smoker    Smokeless tobacco: Never Used   Substance and Sexual Activity    Alcohol use: Never     Frequency: Never    Drug use: Never    Sexual activity: Never     Partners: Female   Lifestyle    Physical activity     Days per week: None     Minutes per session: None  Stress: None   Relationships    Social connections     Talks on phone: None     Gets together: None     Attends Congregational service: None     Active member of club or organization: None     Attends meetings of clubs or organizations: None     Relationship status: None    Intimate partner violence     Fear of current or ex partner: None     Emotionally abused: None     Physically abused: None     Forced sexual activity: None   Other Topics Concern    None   Social History Narrative    ** Merged History Encounter **         Do you donate blood or plasma? Caffeine intake? Advance directive? Is blood transfusion acceptable in an emergency? TOBACCO:   reports that he has never smoked. He has never used smokeless tobacco.  ETOH:   reports no history of alcohol use. Drugs:  reports no history of drug use. Allergies:    Allergies   Allergen Reactions    Trazodone And Nefazodone      Became restless and hallucinating    Azithromycin      From old chart    Demerol Hcl [Meperidine] Other (See Comments)     Low BP    Erythromycin Other (See Comments)     Blurred vision    Trazodone And Nefazodone Other (See Comments)     Restlessness and Hallucinations    Demerol Other (See Comments)     Low blood pressure    Erythromycin Other (See Comments)     Blurred vision    Zithromax [Azithromycin Dihydrate] Other (See Comments)     Patient is unsure     Medications:   Medications:    vancomycin  20 mg/kg Intravenous Once    apixaban  2.5 mg Oral BID    aspirin  81 mg Oral Daily    cholestyramine light  4 g Oral BID    dicyclomine  10 mg Oral TID    donepezil  10 mg Oral Nightly    [START ON 7/7/2020] furosemide  40 mg Oral QAM    memantine  10 mg Oral BID    memantine  10 mg Oral Nightly    tamsulosin  0.4 mg Oral Nightly    sotalol  40 mg Oral BID    [START ON 7/7/2020] potassium chloride  10 mEq Oral QAM    [START ON 7/7/2020] pantoprazole  40 mg Oral QAM AC    therapeutic multivitamin-minerals 1 tablet Oral Daily    sodium chloride flush  10 mL Intravenous 2 times per day    sodium chloride flush  10 mL Intravenous 2 times per day    cefepime  2 g Intravenous Q8H    vancomycin  15 mg/kg Intravenous Q12H      Infusions:    dilTIAZem 12.5 mg/hr (07/06/20 1718)    sodium chloride       PRN Meds:    Prior to Admission Meds:  Prior to Admission medications    Medication Sig Start Date End Date Taking? Authorizing Provider   gabapentin (NEURONTIN) 100 MG capsule Take 2 capsules by mouth 3 times daily for 90 days.  6/24/20 9/22/20  Prudence MICHAEL Mckeon   apixaban (ELIQUIS) 5 MG TABS tablet Take 2.5 mg by mouth 2 times daily    Historical Provider, MD   DULoxetine (CYMBALTA) 20 MG extended release capsule Take 10 mg by mouth nightly    Historical Provider, MD   metFORMIN (GLUCOPHAGE) 1000 MG tablet Take 1,000 mg by mouth daily    Historical Provider, MD   tamsulosin (FLOMAX) 0.4 MG capsule Take 0.4 mg by mouth nightly    Historical Provider, MD   potassium chloride (MICRO-K) 10 MEQ extended release capsule Take 10 mEq by mouth daily    Historical Provider, MD   sotalol (BETAPACE) 80 MG tablet Take 40 mg by mouth 2 times daily    Historical Provider, MD   memantine (NAMENDA) 10 MG tablet Take 10 mg by mouth nightly    Historical Provider, MD   furosemide (LASIX) 40 MG tablet Take 40 mg by mouth daily    Historical Provider, MD   donepezil (ARICEPT) 10 MG tablet Take 10 mg by mouth nightly    Historical Provider, MD   acetaminophen (TYLENOL) 325 MG tablet Take 650 mg by mouth every 6 hours as needed for Pain    Historical Provider, MD   Multiple Vitamins-Minerals (THERAPEUTIC MULTIVITAMIN-MINERALS) tablet Take 1 tablet by mouth daily    Historical Provider, MD   cholestyramine light 4 g packet Take 1 packet by mouth 2 times daily 3/11/20   Navya Castano MD   famotidine (PEPCID) 20 MG tablet Take 1 tablet by mouth 2 times daily 12/23/19   Avidia, DO   omeprazole (PRILOSEC) 20 MG delayed release capsule Take 1 capsule by mouth 2 times daily (before meals) 12/23/19   David Larsen DO   dicyclomine (BENTYL) 10 MG capsule Take 1 capsule by mouth 3 times daily As needed for abdominal pain 12/23/19   David Larsen DO   ELIQUIS 5 MG TABS tablet Take 2.5 mg by mouth 2 times daily  5/17/19   Historical Provider, MD   DULoxetine (CYMBALTA) 60 MG extended release capsule Take 60 mg by mouth daily  6/27/19   Historical Provider, MD   memantine (NAMENDA) 10 MG tablet Take 10 mg by mouth 2 times daily 7/22/19   Historical Provider, MD   metFORMIN (GLUCOPHAGE-XR) 500 MG extended release tablet Take 500 mg by mouth daily    Historical Provider, MD   gabapentin (NEURONTIN) 400 MG capsule Take 400 mg by mouth Daily with lunch. Historical Provider, MD   gabapentin (NEURONTIN) 400 MG capsule Take 800 mg by mouth nightly. Historical Provider, MD   donepezil (ARICEPT) 10 MG tablet Take 10 mg by mouth nightly     Historical Provider, MD   furosemide (LASIX) 40 MG tablet Take 40 mg by mouth every morning     Historical Provider, MD   potassium chloride (KLOR-CON M) 10 MEQ extended release tablet Take 10 mEq by mouth every morning    Historical Provider, MD   aspirin 81 MG chewable tablet Take 1 tablet by mouth daily 6/22/16   Curly Costa MD   tamsulosin (FLOMAX) 0.4 MG capsule Take 0.4 mg by mouth nightly     Historical Provider, MD   sotalol (BETAPACE) 80 MG tablet Take 40 mg by mouth 2 times daily. Historical Provider, MD   Multiple Vitamin (MULTIVITAMIN PO) Take 1 tablet by mouth daily. Historical Provider, MD     Data:     Laboratory this visit:  Reviewed  Recent Labs     07/06/20  1430   WBC 10.7*   HGB 15.9   HCT 49.6   *      No results for input(s): NA, K, CL, CO2, PHOS, BUN, CREATININE in the last 72 hours. Invalid input(s): CA  No results for input(s): AST, ALT, ALB, BILIDIR, BILITOT, ALKPHOS in the last 72 hours.   Recent Labs     07/06/20  1430   INR 1.28     No results for input(s): CKTOTAL, CKMB, CKMBINDEX in the last 72 hours. Invalid input(s): Juan Alberto Favorite input(s): PRO-BNP    Radiology this visit:  Reviewed. Ct Abdomen Pelvis Wo Contrast Additional Contrast? None    Result Date: 7/6/2020  EXAMINATION: CT OF THE ABDOMEN AND PELVIS WITHOUT CONTRAST 7/6/2020 3:03 pm TECHNIQUE: CT of the abdomen and pelvis was performed without the administration of intravenous contrast. Multiplanar reformatted images are provided for review. Dose modulation, iterative reconstruction, and/or weight based adjustment of the mA/kV was utilized to reduce the radiation dose to as low as reasonably achievable. COMPARISON: 03/08/2020 HISTORY: ORDERING SYSTEM PROVIDED HISTORY: ams TECHNOLOGIST PROVIDED HISTORY: Reason for exam:->ams Additional Contrast?->None Reason for Exam: AMS Acuity: Acute Type of Exam: Initial Additional signs and symptoms: JAUNDICE LOOKING Relevant Medical/Surgical History: PATIENT UNABLE TO FOLLOW COMMANDS FINDINGS: Lower Chest: Lung bases are grossly clear given patient motion artifact. Organs: Status post cholecystectomy. The unenhanced liver, spleen, pancreas and adrenal glands are without focal abnormality. No renal or ureteral calculus. No hydronephrosis or perinephric stranding. There is a 1.5 cm right renal cyst. GI/Bowel: No dilated loops of bowel or bowel wall thickening. Scattered colonic diverticula without acute diverticulitis. No free air. The appendix is not visualized. Pelvis: Bladder is decompressed. Circumferential wall thickening of the bladder is noted. Prostatic hypertrophy. No pelvic adenopathy or free fluid. Peritoneum/Retroperitoneum: The aorta is normal in caliber with moderate-to-severe atherosclerosis. No retroperitoneal or mesenteric adenopathy. No ascites or drainable fluid collection. Bones/Soft Tissues: Neurostimulator device is noted. Diffuse osteopenia. Postsurgical changes are noted at L3-L5.      1. No acute abdominal or pelvic abnormality on this unenhanced study. 2. Prostatic hypertrophy. Ct Head Wo Contrast    Result Date: 7/6/2020  EXAMINATION: CT OF THE HEAD WITHOUT CONTRAST  7/6/2020 3:03 pm TECHNIQUE: CT of the head was performed without the administration of intravenous contrast. Dose modulation, iterative reconstruction, and/or weight based adjustment of the mA/kV was utilized to reduce the radiation dose to as low as reasonably achievable. COMPARISON: 03/08/2020. HISTORY: ORDERING SYSTEM PROVIDED HISTORY: AMS TECHNOLOGIST PROVIDED HISTORY: Reason for exam:->AMS Has a \"code stroke\" or \"stroke alert\" been called? ->No Reason for Exam: AMS Acuity: Unknown Type of Exam: Unknown Additional signs and symptoms: UNKNOWN Relevant Medical/Surgical History: POOR HISTOIAN FINDINGS: BRAIN/VENTRICLES: There is no acute intracranial hemorrhage, mass effect or midline shift. No abnormal extra-axial fluid collection. The gray-white differentiation is maintained without evidence of an acute infarct. There is no evidence of hydrocephalus. The cerebral sulci and ventricles are enlarged. There is low-attenuation within the periventricular white matter and deep white matter of the brain. There are old lacune infarcts involving the basal ganglia. ORBITS: The visualized portion of the orbits demonstrate no acute abnormality. SINUSES: The visualized paranasal sinuses and mastoid air cells demonstrate no acute abnormality. SOFT TISSUES/SKULL:  No acute abnormality of the visualized skull or soft tissues. 1. No acute intracranial abnormality. 2. Diffuse cerebral atrophy with chronic small vessel ischemic disease.      Ct Head Wo Contrast    Result Date: 6/19/2020  EXAMINATION: CT OF THE HEAD WITHOUT CONTRAST  6/19/2020 8:24 pm TECHNIQUE: CT of the head was performed without the administration of intravenous contrast. Dose modulation, iterative reconstruction, and/or weight based adjustment of the mA/kV was utilized to reduce the radiation dose to as low as reasonably achievable. COMPARISON: None. HISTORY: ORDERING SYSTEM PROVIDED HISTORY: fall, fatigue TECHNOLOGIST PROVIDED HISTORY: Has a \"code stroke\" or \"stroke alert\" been called? ->No Reason for exam:->fall, fatigue Reason for Exam: fall, fatigue Acuity: Acute Type of Exam: Initial Mechanism of Injury: fall, fatigue Relevant Medical/Surgical History: none FINDINGS: BRAIN/VENTRICLES: There is no acute intracranial hemorrhage, mass effect or midline shift. No abnormal extra-axial fluid collection. The gray-white differentiation is maintained without evidence of an acute infarct. There is prominence of the ventricles and sulci due to global parenchymal volume loss. There are nonspecific areas of hypoattenuation within the periventricular and subcortical white matter, which likely represent chronic microvascular ischemic change. Anterior left external capsule hypoattenuating focus measures about 3 x 5 mm typical of sequela from lacunar stroke. ORBITS: The visualized portion of the orbits demonstrate no acute abnormality. SINUSES: The visualized paranasal sinuses and mastoid air cells demonstrate no acute abnormality. SOFT TISSUES/SKULL: No acute abnormality of the visualized skull or soft tissues. No acute intracranial abnormality. Senescent changes. Age indeterminate but likely old lacunar stroke anterior aspect of the left external capsule. Xr Chest Portable    Result Date: 7/6/2020  EXAMINATION: ONE XRAY VIEW OF THE CHEST 7/6/2020 2:32 pm COMPARISON: 03/08/2020 HISTORY: ORDERING SYSTEM PROVIDED HISTORY: ams TECHNOLOGIST PROVIDED HISTORY: Reason for exam:->ams Reason for Exam: ams FINDINGS: Postoperative changes were noted from prior sternotomy. A neuro stimulator electrode was noted with the distal tip at the level of T7. The heart was at the upper limits of normal for size. No interstitial edema was noted.  Either pleural thickening or subtle effusion blunts the right costophrenic angle. This is unchanged from 03/08/2020. Since the prior study, a barely perceptible non consolidating interstitial subsegmental pneumonia like infiltrate was noted in the left mid lung, probably in the superior segment of the left lower lobe. Postoperative changes from prior sternotomy. Neurostimulator electrode at the level of T7. The heart was at the upper limits of normal for size without interstitial edema. Either subtle pleural thickening or subtle effusion blunts the right costophrenic angle. This is unchanged from 03/08/2020. Barely perceptible non consolidating interstitial subsegmental pneumonia like infiltrate in the left mid lung probably in the superior segment of the left lower lobe. This is new from the previous study. Xr Chest Portable    Result Date: 6/22/2020  EXAMINATION: ONE XRAY VIEW OF THE CHEST 6/22/2020 4:45 pm COMPARISON: Chest 06/19/2020 HISTORY: ORDERING SYSTEM PROVIDED HISTORY: SOB, compare to last CXR. Follow-up infiltrates described on prior chest radiograph. Acuity: Acute Type of Exam: Initial Additional signs and symptoms: na Relevant Medical/Surgical History: a fib FINDINGS: The cardiac silhouette is enlarged. Calcifications involving the aorta reflect atherosclerosis. The mediastinal and hilar silhouettes appear unremarkable. Vascular engorgement and cephalization is demonstrated with bilateral peribronchial cuffing and perivascular haziness. Hazy opacities throughout the left lung have become more confluent. Possible layering pleural effusion on the left. No pneumothorax is seen. No acute osseous abnormality is identified. Sequela from prior open heart surgery. 1. Congestive heart failure is most likely given the radiographic findings; pneumonia is also a consideration in areas of consolidation with pleural effusion. 2. Calcific atherosclerosis aorta. 3. Cardiomegaly.      Xr Chest Portable    Result Date: 6/19/2020  EXAMINATION: ONE XRAY VIEW OF THE CHEST 6/19/2020 7:54 pm COMPARISON: None. HISTORY: ORDERING SYSTEM PROVIDED HISTORY: chest pain Acuity: Acute Type of Exam: Initial Additional signs and symptoms: na Relevant Medical/Surgical History: na FINDINGS: The cardiac silhouette is enlarged. Calcifications involving the aorta reflect atherosclerosis. The mediastinal and hilar silhouettes appear unremarkable. Nonspecific diffuse alveolar infiltrates throughout the left lung. Vascular engorgement and cephalization is demonstrated with bilateral peribronchial cuffing and perivascular haziness. No definite pleural effusion. No pneumothorax is seen. No acute osseous abnormality is identified. Sequela from prior heart valve surgery. 1. Nonspecific infiltrates bilateral lungs greater on the left than right with other findings suggesting congestive heart failure. Diffuse infection or early change of ARDS are other considerations. 2. Calcific atherosclerosis aorta. 3. Cardiomegaly.      EKG this visit:   EKG: Atrial fibrillation with rapid ventricular response, rate 111   Right bundle branch block   Left anterior fascicular block    Bifascicular block    Abnormal ECG   When compared with ECG of 08-MAR-2020 04:27,   Criteria for Septal infarct are no longer present   Nonspecific T wave abnormality no longer evident in Inferior leads     Current Treatment Team:  Treatment Team: Attending Provider: José Fajardo DO; Registered Nurse: Doug Ye RN; Consulting Physician: BRETT Martínez CNP, APRN-BC [facility]  7/6/2020 5:01 PM      Electronically signed by BRETT Martínez CNP on 7/6/2020 at 5:01 PM

## 2020-07-06 NOTE — ED NOTES
Called and spoke with wife Tash Danielle to inform her of her husbands room number upstairs 2022.      Isac Harada, RN  07/06/20 [id number]

## 2020-07-06 NOTE — ED NOTES
[id number] paged hospitalist     Danette Nicole  07/06/20 1600  1612 Matilda mid level with [facility] returned call      Danette Nicole  07/06/20 [id number]

## 2020-07-07 ENCOUNTER — APPOINTMENT (OUTPATIENT)
Dept: CT IMAGING | Age: 85
DRG: 177 | End: 2020-07-07
Payer: COMMERCIAL

## 2020-07-07 LAB
ALBUMIN SERPL-MCNC: 3.7 GM/DL (ref 3.4–5)
ALP BLD-CCNC: 55 IU/L (ref 40–129)
ALT SERPL-CCNC: 28 U/L (ref 10–40)
ANION GAP SERPL CALCULATED.3IONS-SCNC: 16 MMOL/L (ref 4–16)
APTT: 26 SECONDS (ref 25.1–37.1)
AST SERPL-CCNC: 30 IU/L (ref 15–37)
BASOPHILS ABSOLUTE: 0 K/CU MM
BASOPHILS RELATIVE PERCENT: 0.3 % (ref 0–1)
BILIRUB SERPL-MCNC: 2.4 MG/DL (ref 0–1)
BUN BLDV-MCNC: 69 MG/DL (ref 6–23)
CALCIUM SERPL-MCNC: 8.9 MG/DL (ref 8.3–10.6)
CHLORIDE BLD-SCNC: 114 MMOL/L (ref 99–110)
CO2: 20 MMOL/L (ref 21–32)
CREAT SERPL-MCNC: 1.7 MG/DL (ref 0.9–1.3)
CULTURE: NORMAL
D DIMER: 269 NG/ML(DDU)
DIFFERENTIAL TYPE: ABNORMAL
EOSINOPHILS ABSOLUTE: 0 K/CU MM
EOSINOPHILS RELATIVE PERCENT: 0 % (ref 0–3)
FIBRINOGEN LEVEL: 214 MG/DL (ref 196.9–442.1)
GFR AFRICAN AMERICAN: 47 ML/MIN/1.73M2
GFR NON-AFRICAN AMERICAN: 39 ML/MIN/1.73M2
GLUCOSE BLD-MCNC: 127 MG/DL (ref 70–99)
GLUCOSE BLD-MCNC: 143 MG/DL (ref 70–99)
GLUCOSE BLD-MCNC: 151 MG/DL (ref 70–99)
GLUCOSE BLD-MCNC: 180 MG/DL (ref 70–99)
GLUCOSE BLD-MCNC: 194 MG/DL (ref 70–99)
HCT VFR BLD CALC: 40.4 % (ref 42–52)
HEMOGLOBIN: 12.4 GM/DL (ref 13.5–18)
IMMATURE NEUTROPHIL %: 0.8 % (ref 0–0.43)
INR BLD: 1.25 INDEX
LACTATE: 2.7 MMOL/L (ref 0.4–2)
LEGIONELLA URINARY AG: NEGATIVE
LIPASE: 62 IU/L (ref 13–60)
LV EF: 48 %
LVEF MODALITY: NORMAL
LYMPHOCYTES ABSOLUTE: 2.3 K/CU MM
LYMPHOCYTES RELATIVE PERCENT: 21.7 % (ref 24–44)
Lab: NORMAL
MAGNESIUM: 2.8 MG/DL (ref 1.8–2.4)
MCH RBC QN AUTO: 31.5 PG (ref 27–31)
MCHC RBC AUTO-ENTMCNC: 30.7 % (ref 32–36)
MCV RBC AUTO: 102.5 FL (ref 78–100)
MONOCYTES ABSOLUTE: 1.1 K/CU MM
MONOCYTES RELATIVE PERCENT: 9.8 % (ref 0–4)
NUCLEATED RBC %: 0.8 %
PDW BLD-RTO: 21.5 % (ref 11.7–14.9)
PLATELET # BLD: 290 K/CU MM (ref 140–440)
PMV BLD AUTO: 11.7 FL (ref 7.5–11.1)
POTASSIUM SERPL-SCNC: 4.5 MMOL/L (ref 3.5–5.1)
PRO-BNP: 2102 PG/ML
PROCALCITONIN: 0.37
PROTHROMBIN TIME: 15.2 SECONDS (ref 11.7–14.5)
RBC # BLD: 3.94 M/CU MM (ref 4.6–6.2)
REASON FOR REJECTION: NORMAL
REASON FOR REJECTION: NORMAL
REJECTED TEST: NORMAL
REJECTED TEST: NORMAL
SEGMENTED NEUTROPHILS ABSOLUTE COUNT: 7.2 K/CU MM
SEGMENTED NEUTROPHILS RELATIVE PERCENT: 67.4 % (ref 36–66)
SODIUM BLD-SCNC: 150 MMOL/L (ref 135–145)
SPECIMEN: NORMAL
STREP PNEUMONIAE ANTIGEN: NORMAL
T4 FREE: 1.23 NG/DL (ref 0.9–1.8)
TOTAL CK: 31 IU/L (ref 38–174)
TOTAL IMMATURE NEUTOROPHIL: 0.09 K/CU MM
TOTAL NUCLEATED RBC: 0.1 K/CU MM
TOTAL PROTEIN: 6.4 GM/DL (ref 6.4–8.2)
TROPONIN T: <0.01 NG/ML
TSH HIGH SENSITIVITY: 0.77 UIU/ML (ref 0.27–4.2)
WBC # BLD: 10.7 K/CU MM (ref 4–10.5)

## 2020-07-07 PROCEDURE — 85730 THROMBOPLASTIN TIME PARTIAL: CPT

## 2020-07-07 PROCEDURE — 85379 FIBRIN DEGRADATION QUANT: CPT

## 2020-07-07 PROCEDURE — 6360000002 HC RX W HCPCS: Performed by: NURSE PRACTITIONER

## 2020-07-07 PROCEDURE — 80053 COMPREHEN METABOLIC PANEL: CPT

## 2020-07-07 PROCEDURE — 84439 ASSAY OF FREE THYROXINE: CPT

## 2020-07-07 PROCEDURE — 85384 FIBRINOGEN ACTIVITY: CPT

## 2020-07-07 PROCEDURE — 93306 TTE W/DOPPLER COMPLETE: CPT

## 2020-07-07 PROCEDURE — 85610 PROTHROMBIN TIME: CPT

## 2020-07-07 PROCEDURE — 83615 LACTATE (LD) (LDH) ENZYME: CPT

## 2020-07-07 PROCEDURE — 6370000000 HC RX 637 (ALT 250 FOR IP): Performed by: INTERNAL MEDICINE

## 2020-07-07 PROCEDURE — 83605 ASSAY OF LACTIC ACID: CPT

## 2020-07-07 PROCEDURE — 71250 CT THORAX DX C-: CPT

## 2020-07-07 PROCEDURE — 82550 ASSAY OF CK (CPK): CPT

## 2020-07-07 PROCEDURE — 82962 GLUCOSE BLOOD TEST: CPT

## 2020-07-07 PROCEDURE — G0378 HOSPITAL OBSERVATION PER HR: HCPCS

## 2020-07-07 PROCEDURE — 96368 THER/DIAG CONCURRENT INF: CPT

## 2020-07-07 PROCEDURE — 2500000003 HC RX 250 WO HCPCS: Performed by: EMERGENCY MEDICINE

## 2020-07-07 PROCEDURE — 2060000000 HC ICU INTERMEDIATE R&B

## 2020-07-07 PROCEDURE — 51702 INSERT TEMP BLADDER CATH: CPT

## 2020-07-07 PROCEDURE — 96366 THER/PROPH/DIAG IV INF ADDON: CPT

## 2020-07-07 PROCEDURE — 2580000003 HC RX 258: Performed by: NURSE PRACTITIONER

## 2020-07-07 PROCEDURE — 6370000000 HC RX 637 (ALT 250 FOR IP): Performed by: NURSE PRACTITIONER

## 2020-07-07 PROCEDURE — 83880 ASSAY OF NATRIURETIC PEPTIDE: CPT

## 2020-07-07 PROCEDURE — 83690 ASSAY OF LIPASE: CPT

## 2020-07-07 PROCEDURE — 84145 PROCALCITONIN (PCT): CPT

## 2020-07-07 PROCEDURE — 83735 ASSAY OF MAGNESIUM: CPT

## 2020-07-07 PROCEDURE — 92610 EVALUATE SWALLOWING FUNCTION: CPT

## 2020-07-07 PROCEDURE — 84443 ASSAY THYROID STIM HORMONE: CPT

## 2020-07-07 PROCEDURE — 94761 N-INVAS EAR/PLS OXIMETRY MLT: CPT

## 2020-07-07 PROCEDURE — 84484 ASSAY OF TROPONIN QUANT: CPT

## 2020-07-07 PROCEDURE — 85025 COMPLETE CBC W/AUTO DIFF WBC: CPT

## 2020-07-07 RX ORDER — MIDODRINE HYDROCHLORIDE 5 MG/1
10 TABLET ORAL
Status: DISCONTINUED | OUTPATIENT
Start: 2020-07-07 | End: 2020-07-10 | Stop reason: HOSPADM

## 2020-07-07 RX ADMIN — SODIUM CHLORIDE, PRESERVATIVE FREE 10 ML: 5 INJECTION INTRAVENOUS at 21:03

## 2020-07-07 RX ADMIN — DICYCLOMINE HYDROCHLORIDE 10 MG: 10 CAPSULE ORAL at 16:57

## 2020-07-07 RX ADMIN — MEMANTINE 10 MG: 10 TABLET ORAL at 21:03

## 2020-07-07 RX ADMIN — DICYCLOMINE HYDROCHLORIDE 10 MG: 10 CAPSULE ORAL at 11:47

## 2020-07-07 RX ADMIN — VANCOMYCIN HYDROCHLORIDE 1250 MG: 5 INJECTION, POWDER, LYOPHILIZED, FOR SOLUTION INTRAVENOUS at 16:25

## 2020-07-07 RX ADMIN — DONEPEZIL HYDROCHLORIDE 10 MG: 10 TABLET, FILM COATED ORAL at 21:03

## 2020-07-07 RX ADMIN — DEXTROSE MONOHYDRATE 15 MG/HR: 50 INJECTION, SOLUTION INTRAVENOUS at 12:34

## 2020-07-07 RX ADMIN — PANTOPRAZOLE SODIUM 40 MG: 40 TABLET, DELAYED RELEASE ORAL at 11:47

## 2020-07-07 RX ADMIN — CEFEPIME HYDROCHLORIDE 2 G: 2 INJECTION, POWDER, FOR SOLUTION INTRAVENOUS at 16:57

## 2020-07-07 RX ADMIN — ASPIRIN 81 MG CHEWABLE TABLET 81 MG: 81 TABLET CHEWABLE at 11:47

## 2020-07-07 RX ADMIN — CEFEPIME HYDROCHLORIDE 2 G: 2 INJECTION, POWDER, FOR SOLUTION INTRAVENOUS at 04:27

## 2020-07-07 RX ADMIN — TAMSULOSIN HYDROCHLORIDE 0.4 MG: 0.4 CAPSULE ORAL at 21:03

## 2020-07-07 RX ADMIN — MEMANTINE 10 MG: 10 TABLET ORAL at 11:46

## 2020-07-07 RX ADMIN — SOTALOL HYDROCHLORIDE 40 MG: 80 TABLET ORAL at 21:03

## 2020-07-07 RX ADMIN — MIDODRINE HYDROCHLORIDE 10 MG: 5 TABLET ORAL at 23:12

## 2020-07-07 RX ADMIN — DEXTROSE MONOHYDRATE 15 MG/HR: 50 INJECTION, SOLUTION INTRAVENOUS at 06:03

## 2020-07-07 RX ADMIN — DEXTROSE MONOHYDRATE 15 MG/HR: 50 INJECTION, SOLUTION INTRAVENOUS at 18:47

## 2020-07-07 RX ADMIN — MULTIPLE VITAMINS W/ MINERALS TAB 1 TABLET: TAB at 11:46

## 2020-07-07 RX ADMIN — SOTALOL HYDROCHLORIDE 40 MG: 80 TABLET ORAL at 11:47

## 2020-07-07 ASSESSMENT — PAIN SCALES - PAIN ASSESSMENT IN ADVANCED DEMENTIA (PAINAD)
BODYLANGUAGE: 1
FACIALEXPRESSION: 1
CONSOLABILITY: 1
CONSOLABILITY: 1
BREATHING: 0
TOTALSCORE: 4
BREATHING: 0
NEGVOCALIZATION: 1
BREATHING: 0
BREATHING: 0
BODYLANGUAGE: 1
BODYLANGUAGE: 1
NEGVOCALIZATION: 1
NEGVOCALIZATION: 1
CONSOLABILITY: 1
NEGVOCALIZATION: 1
TOTALSCORE: 4
BREATHING: 0
FACIALEXPRESSION: 1
NEGVOCALIZATION: 1
TOTALSCORE: 4
BREATHING: 0
BREATHING: 0
FACIALEXPRESSION: 1
CONSOLABILITY: 1
FACIALEXPRESSION: 1
NEGVOCALIZATION: 1
CONSOLABILITY: 1
BODYLANGUAGE: 1
FACIALEXPRESSION: 1
BODYLANGUAGE: 1
TOTALSCORE: 4
CONSOLABILITY: 1
NEGVOCALIZATION: 1
FACIALEXPRESSION: 1
TOTALSCORE: 4
BODYLANGUAGE: 1
NEGVOCALIZATION: 1
TOTALSCORE: 4
NEGVOCALIZATION: 1
FACIALEXPRESSION: 1
CONSOLABILITY: 1
FACIALEXPRESSION: 1
TOTALSCORE: 4
CONSOLABILITY: 1
BODYLANGUAGE: 1
NEGVOCALIZATION: 1
TOTALSCORE: 4
NEGVOCALIZATION: 1
BODYLANGUAGE: 1
FACIALEXPRESSION: 1
NEGVOCALIZATION: 1
CONSOLABILITY: 1
TOTALSCORE: 4
FACIALEXPRESSION: 1
TOTALSCORE: 4
FACIALEXPRESSION: 1
BREATHING: 0
CONSOLABILITY: 1
FACIALEXPRESSION: 1
BREATHING: 0
BREATHING: 0
BODYLANGUAGE: 1
CONSOLABILITY: 1
BODYLANGUAGE: 1
BODYLANGUAGE: 1
CONSOLABILITY: 1
TOTALSCORE: 4
BODYLANGUAGE: 1
TOTALSCORE: 4

## 2020-07-07 ASSESSMENT — PAIN SCALES - WONG BAKER
WONGBAKER_NUMERICALRESPONSE: 0

## 2020-07-07 ASSESSMENT — PAIN SCALES - GENERAL
PAINLEVEL_OUTOF10: 0
PAINLEVEL_OUTOF10: 4
PAINLEVEL_OUTOF10: 0
PAINLEVEL_OUTOF10: 4
PAINLEVEL_OUTOF10: 4

## 2020-07-07 NOTE — PLAN OF CARE
Problem: Pain:  Goal: Pain level will decrease  Description: Pain level will decrease  Outcome: Ongoing  Goal: Control of acute pain  Description: Control of acute pain  Outcome: Ongoing  Goal: Control of chronic pain  Description: Control of chronic pain  Outcome: Ongoing     Problem: Falls - Risk of:  Goal: Will remain free from falls  Description: Will remain free from falls  Outcome: Ongoing  Goal: Absence of physical injury  Description: Absence of physical injury  Outcome: Ongoing     Problem: Skin Integrity:  Goal: Will show no infection signs and symptoms  Description: Will show no infection signs and symptoms  Outcome: Ongoing  Goal: Absence of new skin breakdown  Description: Absence of new skin breakdown  Outcome: Ongoing

## 2020-07-07 NOTE — PROGRESS NOTES
Hospitalist Progress Note      Name:  Luis Carlos Blackwood /Age/Sex: 1935  (80 y.o. male)   MRN & CSN:  [id number] & [id number] Admission Date/Time: 2020  2:08 PM   Location:  [address] [place] [address] PCP: Farhad Shearer MD         Hospital Day: 2    Assessment and Plan:   Luis Carlos Blackwood is a 80 y.o.  male  who presents with Acute encephalopathy    --- Acute encephalopathy, septic vs metabolic. Unchanged. ?  Worsening of underlying dementia. CT head -negative for acute abnormality. Plan  Continue to monitor mental status. Patient CODE STATUS currently DNR CC. Will consult hospice team to evaluate patient. --- Probable pneumonia, viral vs bacterial.  Chest x-ray: Barely perceptible left lower lobe interstitial infiltrate. Respiratory disease panel PCR negative. COVID-19 pending. MRSA nasal culture pending. Strep pneumonia and Legionella urine antigen negative. Blood cultures pending. Plan  Continue vancomycin and cefepime. Follow-up pending pneumonia labs and COVID-19. Can be transferred out of COVID unit if COVID-19 negative. --- Atrial fibrillation with RVR [[id number] 4). On chronic anticoagulation with apixaban. Plan  Continue Cardizem drip and apixaban. Continue sotalol. Cardiology on board. --- Mild oral stage dysphagia - dysphagia III diet with nectar thick liquids, aspiration precautions per SLP. Other diagnoses  Dementia-continue donepezil and memantine. BPH- tamsulosin  CAD s/p CABG  Hx pf MV repair  Type II DM -sliding scale. Diet Diet NPO Effective Now   DVT Prophylaxis [x] chronic anticoagulation with apixaban   GI Prophylaxis [x] PPI,  [] H2 Blocker,  [] Carafate,  [] Diet/Tube Feeds   Code Status DNR-CC   Disposition  to be determined   MDM [] Low, [x] Moderate,[]  High     History of Present Illness:     Patient seen and examined. Remains very confused. Still in A. fib with RVR.      Ten point ROS reviewed negative, unless as noted above    Objective: No intake or output data in the 24 hours ending 07/07/20 1013   Vitals:   Vitals:    07/07/20 0401   BP: 105/73   Pulse: 121   Resp: 20   Temp: 97.6 °F (36.4 °C)   SpO2: 97%     Physical Exam:   GEN Awake male, lying in bed. RESP breathing comfortably on room air. CARDIO/VASC S1/S2 auscultated. Irregularly irregular tachycardia. No peripheral edema. GI Abdomen is soft without significant tenderness, masses, or guarding. Bowel sounds are normoactive.  Jamison catheter is not present. MSK No gross joint deformities. SKIN Normal coloration, warm, dry. NEURO Cranial nerves appear grossly intact, garbled speech. PSYCH Awake, alert, very confused. Medications:   Medications:    apixaban  2.5 mg Oral BID    aspirin  81 mg Oral Daily    dicyclomine  10 mg Oral TID AC    donepezil  10 mg Oral Nightly    memantine  10 mg Oral BID    memantine  10 mg Oral Nightly    tamsulosin  0.4 mg Oral Nightly    sotalol  40 mg Oral BID    pantoprazole  40 mg Oral QAM AC    therapeutic multivitamin-minerals  1 tablet Oral Daily    sodium chloride flush  10 mL Intravenous 2 times per day    cefepime  2 g Intravenous Q12H    insulin lispro  0-12 Units Subcutaneous TID WC    insulin lispro  0-6 Units Subcutaneous Nightly    vancomycin  1,250 mg Intravenous Q24H      Infusions:    dilTIAZem 15 mg/hr (07/07/20 0603)    dextrose       PRN Meds: acetaminophen, 650 mg, Q6H PRN    Or  acetaminophen, 650 mg, Q6H PRN  sodium chloride flush, 10 mL, PRN  acetaminophen, 650 mg, Q6H PRN    Or  acetaminophen, 650 mg, Q6H PRN  glucose, 15 g, PRN  dextrose, 12.5 g, PRN  glucagon (rDNA), 1 mg, PRN  dextrose, 100 mL/hr, PRN        CBC   Recent Labs     07/06/20  1430 07/07/20  0445   WBC 10.7* 10.7*   HGB 15.9 12.4*   HCT 49.6 40.4*   * 290      BMP No results for input(s): NA, K, CL, CO2, PHOS, BUN, CREATININE in the last 72 hours.     Invalid input(s): CA    Radiology report reviewed     Electronically signed by Idania Hidalgo MD on 7/7/2020 at 10:13 AM

## 2020-07-07 NOTE — CONSULTS
PHARMACY VANCOMYCIN MONITORING SERVICE    Anant Pool is a 80 y.o. male started on vancomycin for pneumonia. Pharmacy consulted by Dr. Dajuan Torres / NP Jayson Mehta for monitoring and adjustment. Indication for treatment: Pneumonia  Goal trough: 15 mcg/mL  Other antimicrobials: Cefepime    Ht Readings from Last 1 Encounters:   07/06/20 5' 10\" (1.778 m)     Wt Readings from Last 3 Encounters:   07/06/20 162 lb 0.6 oz (73.5 kg)   06/24/20 165 lb 3.2 oz (74.9 kg)   03/11/20 183 lb 1.6 oz (83.1 kg)        Pertinent Laboratory Values:   Temp Readings from Last 3 Encounters:   07/06/20 97.6 °F (36.4 °C) (Oral)   06/24/20 98.1 °F (36.7 °C) (Oral)   03/12/20 98.1 °F (36.7 °C) (Oral)     Recent Labs     07/06/20  1430   WBC 10.7*   LACTATE 5.9*     No results for input(s): BUN, CREATININE in the last 72 hours. Estimated Creatinine Clearance: 46 mL/min (based on SCr of 1.2 mg/dL). No intake or output data in the 24 hours ending 07/06/20 2047    Pertinent Cultures:  Date    Source    Results  7/6/2020  Blood    Collected  7/6/2020  Sputum   Ordered  7/6/2020  Resp Dz Panel  Collected  7/6/2020  MRSA Nasal Screen   Ordered      Previous vancomycin levels:  TROUGH:  No results for input(s): VANCOTROUGH in the last 72 hours. RANDOM:  No results for input(s): VANCORANDOM in the last 72 hours. Assessment/Plan:  Patient will receive a one-time loading dose of vancomycin 1,500mg (20mg/kg) followed by a vancomycin regimen of 1,250mg q24h. This vancomycin regimen is expected to produce a therapeutic trough between 15-20mg/dL. Pharmacy will continue to monitor renal function, clinical status & recommend de-escalation if appropriate. Will adjust therapy as indicated      VANCOMYCIN TROUGH SCHEDULED FOR 7/8/2020 @ 1530      Thank you for the consult and the opportunity to serve you and your patients.    Silas Warren RPh, PharmD, BCPS  Phone: [phone]  7/6/2020   8:49 PM

## 2020-07-07 NOTE — CONSULTS
Dictated -[id number]  afib with RVR will place NG and meds  Hx of CAD and CABG   Hx of MVR repair     He was here for pneumonia  Recently then angella to [facility]hab[facility]  Came in with confusion and cold  He is in afib

## 2020-07-07 NOTE — CONSULTS
[address], [address] [facility]                                  CONSULTATION    PATIENT NAME: Agustin Vazquez                     :        1935  MED REC NO:   [id number]                          ROOM:         ACCOUNT NO:   [de-identified]                           ADMIT DATE: 2020  PROVIDER:     Gui Dockery MD    CONSULT DATE:  2020    INDICATION:  AFib. HISTORY OF PRESENT ILLNESS:  This is an 40-year-old male patient who was  in the hospital here about a month ago. He came yesterday with multiple  complaints present with mental status change present. He is in AFib  with RVR present. The patient is in a COVID unit. His rate is in to  120s to 140s on a Cardizem drip. The patient is able to provide some  history, but some confusion also present, but denies any chest pain  present. I think he failed a swallow study also. The patient had an echo done back in . At that time, echo shows LV  function was preserved. The patient has a history of having heart failure present. He has a  history of coronary artery disease. He is status post mitral valve  repair done in . The patient had a heart catheterization, found to  have left main is patent. Circ had mild disease. LAD had mild disease. RCA has mild disease noted. The patient underwent a bypass surgery,  LIMA graft to diagonal branch and ramus branch, one vessel, I think  sequential graft was done. Mitral valve repair was done also with a new  _____ P2 segment and a ring was also placed at that time. PAST MEDICAL HISTORY:  History of having coronary artery disease status  post CABG done, one vessel, LIMA to diagonal and circumflex artery. History of status post mitral valve repair for mitral regurgitation with  a ring placement. Hypertension, hyperlipidemia, paroxysmal atrial  fibrillation present. Heart catheterization in . Left main was patent. LAD mild disease. Diagonal had 60% stenosis. Circ and OM had distal 70% stenosis. RCA  was large and patent. PAST SURGICAL HISTORY:  LIMA to diagonal branch bypass surgery, mitral  valve repair done. History of back surgery done. Appendectomy and  gallbladder surgery. SOCIAL HISTORY:  He does not smoke and does not drink. ALLERGIES:  TRAZODONE and ZITHROMAX. See the rest of medication list.    MEDICATIONS:  The patient is on Neurontin, Eliquis 2.5 mg b.i.d.,  Cymbalta, Glucophage, Namenda. See the rest of medication list.  I  think he is on Betapace also 40 mg b.i.d. PHYSICAL EXAMINATION:  GENERAL:  The patient is awake with some confusion present. VITAL SIGNS:  Temperature afebrile, pulse is in 120s to 140s, and blood  pressure 100s present. HEENT:  Head is normocephalic, atraumatic. Pupils are equal and  reactive to light. CHEST:  Equal expansion. LUNGS:  Decreased breath sounds present. HEART:  Irregularly irregular. ABDOMEN:  Soft and nontender. Bowel sounds present. No  hepatosplenomegaly or guarding appreciated. EXTREMITIES:  No cyanosis or clubbing noted. NEUROLOGIC:  Cranial nerves II through XII are grossly intact. EKG shows AFib present. LABORATORY DATA:  His creatinine is 1.2. CBC is within normal range. INR is 1.25.  CT of the abdomen and pelvis was done without contrast.   No acute abnormality noted in the abdomen and prostatic hypertrophy  noted. CT of the head is also negative. IMPRESSION:  This is an 49-year-old male patient with a history of  having coronary artery disease status post CABG done. History of having  mitral valve repair done at the same time. This was done in 2012, from  LIMA graft to the diagonal and ramus branch. Mitral valve repair done. History of paroxysmal atrial fibrillation. Now comes in with AFib with  RVR present with confusion present. From cardiac stand, he is in AFib with RVR present.   Possible pneumonia. An echo has been ordered. I will review that. We will get his rate  controlled. We will discuss with family and make further  recommendations.         Bill Jenkins MD    D: 07/07/2020 9:16:01       T: 07/07/2020 10:14:12     [email or web address]  Job#: [id number]     Doc#: [id number]    CC:

## 2020-07-07 NOTE — CONSULTS
PHARMACY VANCOMYCIN MONITORING SERVICE    Vanesa Denney is a 80 y.o. male started on vancomycin for pneumonia. Pharmacy consulted by Dr. Kelvin Paul / NP Raul Bruno for monitoring and adjustment. Indication for treatment: Pneumonia  Goal trough: 15 mcg/mL  Other antimicrobials: Cefepime    Ht Readings from Last 1 Encounters:   07/06/20 5' 10\" (1.778 m)     Wt Readings from Last 3 Encounters:   07/07/20 162 lb (73.5 kg)   06/24/20 165 lb 3.2 oz (74.9 kg)   03/11/20 183 lb 1.6 oz (83.1 kg)        Pertinent Laboratory Values:   Temp Readings from Last 3 Encounters:   07/07/20 97.6 °F (36.4 °C) (Oral)   06/24/20 98.1 °F (36.7 °C) (Oral)   03/12/20 98.1 °F (36.7 °C) (Oral)     Recent Labs     07/06/20  1430 07/07/20  0445   WBC 10.7* 10.7*   LACTATE 5.9* 2.7*     No results for input(s): BUN, CREATININE in the last 72 hours. Estimated Creatinine Clearance: 46 mL/min (based on SCr of 1.2 mg/dL). No intake or output data in the 24 hours ending 07/07/20 1136    Pertinent Cultures:  Date    Source    Results  7/6/2020  Blood    Collected  7/6/2020  Sputum   Ordered  7/6/2020  Resp Dz Panel  Collected  7/6/2020  MRSA Nasal Screen   Ordered      Previous vancomycin levels:  TROUGH:  No results for input(s): VANCOTROUGH in the last 72 hours. RANDOM:  No results for input(s): VANCORANDOM in the last 72 hours. Assessment:  · WBC and temperature: WBC elevated; Afebrile  · SCr, BUN, and urine output: Ordered, not resulted  · Day(s) of therapy: #2  · Vancomycin level: To be collected    Plan:  · Vancomycin 1500 mg IVPB x1 loading dose, followed by vancomycin 1250 mg IVPB every 24 hours. · Scr levels are ordered daily to continue to follow renal trends. · Plan to check a trough prior to 4th dose.   · Pharmacy will continue to monitor patient and adjust therapy as indicated    REPEAT VANCOMYCIN TROUGH SCHEDULED FOR 7/9 @ 1430    Thank you for the consult,  Radha Almazan , PharmD, [facility]

## 2020-07-07 NOTE — PROGRESS NOTES
Messaged Dr. Saldana May about HR still being afib in the 120s after Digoxin IV push and being maxed out of cardizem. He called me back and stated that we will just monitor for tonight and decide tomorrow morning. Will continue to monitor.

## 2020-07-07 NOTE — PROGRESS NOTES
Speech Language Pathology  Facility/Department: [facility] ICU STEPDOWN   CLINICAL BEDSIDE SWALLOW EVALUATION    NAME: Maryanne Gaucher  : 1935  MRN: [id number]    IMPRESSIONS: Maryanne Gaucher was referred for a bedside swallow evaluation after being admitted to [facility] with acute encephalopathy, COVID r/o. He was recently admitted with PNA. Medical hx includes dementia, a-fib, CAD, DM, HLD, HTN. No known history of dysphagia prior to admission. He was seen for evaluation seated upright in bed, alert, confused, distractible. He benefited from frequent redirections throughout. He followed directions to complete oral mechanism examination given maximal cues; no asymmetry or focal deficit noted. He accepted PO trials of ice chips, thin liquids via tsp/straw, nectar thick liquids via cup/straw, puree, and regular solids. Oral stage mildly impaired with adequate mastication, brief oral holding slightly increased for liquids, suspected premature posterior loss for liquids. Suspect pharyngeal dysphagia with delayed swallow initiation and decreased laryngeal elevation. Delayed coughing followed trials of thin liquids via straw. Recommend initiation of dysphagia III diet with nectar thick liquids, aspiration precautions. Recommend giving pills whole in pudding (pt demonstrated strong distaste for applesauce). Allow ice chips for comfort. Results/recommendations d/w RN. SLP will follow. ADMISSION DATE: 2020  ADMITTING DIAGNOSIS: has Essential hypertension, benign; Other and unspecified hyperlipidemia; Coronary atherosclerosis; Type 2 diabetes mellitus without complication ([address] Neuropathy; Diarrhea; PNA (pneumonia); HCAP (healthcare-associated pneumonia);  Suspected COVID-19 virus infection; Acute encephalopathy; and Atrial fibrillation with RVR ([address] on their problem list.  ONSET DATE: this admission    Recent Chest Xray/CT of Chest: see chart    Date of Eval: 2020  Evaluating Therapist: Estuardo Leroy Jose Maria    Current Diet level:  Current Diet : NPO  Current Liquid Diet : NPO      Primary Complaint  Patient Complaint: does not state, denies pain    Pain:  Pain Assessment  Pain Assessment: Advanced Dementia  Pain Level: 4  Smith-Baker Pain Rating: No hurt  PAINAD (Pain Assessment in Advance Dementia)  Breathing: normal  Negative Vocalization: occasional moan/groan, low speech, negative/disapproving quality  Facial Expression: sad, frightened, frown  Body Language: tense, distressed pacing, fidgeting  Consolability: distracted or reassured by voice/touch  PAINAD Score: 4    Reason for Referral  Maira Owusu was referred for a bedside swallow evaluation to assess the efficiency of his swallow function, identify signs and symptoms of aspiration and make recommendations regarding safe dietary consistencies, effective compensatory strategies, and safe eating environment. Impression  Dysphagia Diagnosis: Mild oral stage dysphagia  Dysphagia Outcome Severity Scale: Level 3: Moderate dysphagia- Total assisstance, supervision or strategies. Two or more diet consistencies restricted     Treatment Plan  Requires SLP Intervention: Yes  Duration/Frequency of Treatment: 1-2x/week for LOS          Recommended Diet and Intervention  Diet Solids Recommendation: Dysphagia Soft and Bite-Sized (Dysphagia III)  Liquid Consistency Recommendation: Mildly Thick (Nectar)  Recommended Form of Meds: Meds in puree     Therapeutic Interventions: Diet tolerance monitoring;Patient/Family education; Therapeutic PO trials with SLP    Compensatory Swallowing Strategies  Compensatory Swallowing Strategies: Upright as possible for all oral intake;Small bites/sips;Assist feed    Treatment/Goals  Short-term Goals  Timeframe for Short-term Goals: length of admission  Goal 1: Pt will tolerate dysphagia 3 diet/nectar thick liquids with adequate oral manipulation and no s/s aspiration.   Goal 2: Pt will tolerate trials of advanced textures with adequate oral manipulation and no s/s aspiration for possible diet upgrade. Goal 3: Caregivers will indicate understanding of safe feeding protocol. Goal 4: Pt will participate in instrumental swallow evaluation as appropriate. General  Chart Reviewed: Yes  Behavior/Cognition: Alert;Confused; Requires cueing  Communication Observation: (cognitive communication deficits)  Follows Directions: Simple(with repetitions)  Dentition: Adequate  Patient Positioning: Upright in bed  Baseline Vocal Quality: Normal  Volitional Cough: Weak  Prior Dysphagia History: none known prior to admission  Consistencies Administered: Reg solid; Dysphagia Pureed (Dysphagia I); Thin - teaspoon; Thin - straw; Ice Chips; Nectar - straw           Vision/Hearing  Hearing  Hearing: Exceptions to [facility]  Hearing Exceptions: Hard of hearing/hearing concerns    Oral Motor Deficits  Oral/Motor  Oral Motor: Within functional limits    Oral Phase Dysfunction  Oral Phase  Oral Phase: Exceptions     Indicators of Pharyngeal Phase Dysfunction   Pharyngeal Phase  Pharyngeal Phase: Exceptions    Prognosis  Prognosis  Prognosis for safe diet advancement: guarded  Individuals consulted  Consulted and agree with results and recommendations: RN    Education  Patient Education: results, recommendations  Patient Education Response: No evidence of learning  Safety Devices in place: Yes  Type of devices:  All fall risk precautions in place       Therapy Time  SLP Individual Minutes  Time In: 4871  Time Out: [address]  Minutes: [address] [facility], [address]  7/7/2020 11:37 AM

## 2020-07-07 NOTE — PROGRESS NOTES
Pt;'s HR cont to rise to 140-160 then back to 120's, cardizem gtt at max rate. Dr. Nguyễn Rosenberg notified. -pt had mod amt of blood in depends with small clots noted on end of urethra, Dr. Nguyễn Rosenberg and Dr. Sena Gaucher notified.  -received call from Dr. Nguyễn Rosenberg stating wife is ok with putting  NG and gao in.

## 2020-07-08 LAB
ALBUMIN SERPL-MCNC: 3.4 GM/DL (ref 3.4–5)
ALP BLD-CCNC: 51 IU/L (ref 40–129)
ALT SERPL-CCNC: 36 U/L (ref 10–40)
ANION GAP SERPL CALCULATED.3IONS-SCNC: 15 MMOL/L (ref 4–16)
AST SERPL-CCNC: 43 IU/L (ref 15–37)
BASOPHILS ABSOLUTE: 0 K/CU MM
BASOPHILS RELATIVE PERCENT: 0.2 % (ref 0–1)
BILIRUB SERPL-MCNC: 1.8 MG/DL (ref 0–1)
BUN BLDV-MCNC: 52 MG/DL (ref 6–23)
CALCIUM SERPL-MCNC: 8.6 MG/DL (ref 8.3–10.6)
CHLORIDE BLD-SCNC: 113 MMOL/L (ref 99–110)
CO2: 16 MMOL/L (ref 21–32)
CREAT SERPL-MCNC: 1.2 MG/DL (ref 0.9–1.3)
CULTURE: NORMAL
DIFFERENTIAL TYPE: ABNORMAL
EOSINOPHILS ABSOLUTE: 0.2 K/CU MM
EOSINOPHILS RELATIVE PERCENT: 2.5 % (ref 0–3)
GFR AFRICAN AMERICAN: >60 ML/MIN/1.73M2
GFR NON-AFRICAN AMERICAN: 58 ML/MIN/1.73M2
GLUCOSE BLD-MCNC: 140 MG/DL (ref 70–99)
GLUCOSE BLD-MCNC: 145 MG/DL (ref 70–99)
GLUCOSE BLD-MCNC: 176 MG/DL (ref 70–99)
GLUCOSE BLD-MCNC: 180 MG/DL (ref 70–99)
GLUCOSE BLD-MCNC: 215 MG/DL (ref 70–99)
HCT VFR BLD CALC: 36.3 % (ref 42–52)
HEMOGLOBIN: 10.9 GM/DL (ref 13.5–18)
IMMATURE NEUTROPHIL %: 1 % (ref 0–0.43)
LACTATE: 2.9 MMOL/L (ref 0.4–2)
LYMPHOCYTES ABSOLUTE: 1.9 K/CU MM
LYMPHOCYTES RELATIVE PERCENT: 22 % (ref 24–44)
Lab: NORMAL
MCH RBC QN AUTO: 32.3 PG (ref 27–31)
MCHC RBC AUTO-ENTMCNC: 30 % (ref 32–36)
MCV RBC AUTO: 107.7 FL (ref 78–100)
MONOCYTES ABSOLUTE: 0.6 K/CU MM
MONOCYTES RELATIVE PERCENT: 6.9 % (ref 0–4)
NUCLEATED RBC %: 0.8 %
PDW BLD-RTO: 21.4 % (ref 11.7–14.9)
PLATELET # BLD: 221 K/CU MM (ref 140–440)
PMV BLD AUTO: 10.9 FL (ref 7.5–11.1)
POTASSIUM SERPL-SCNC: 4.5 MMOL/L (ref 3.5–5.1)
RBC # BLD: 3.37 M/CU MM (ref 4.6–6.2)
REASON FOR REJECTION: NORMAL
REASON FOR REJECTION: NORMAL
REJECTED TEST: NORMAL
REJECTED TEST: NORMAL
SARS-COV-2: NOT DETECTED
SEGMENTED NEUTROPHILS ABSOLUTE COUNT: 5.9 K/CU MM
SEGMENTED NEUTROPHILS RELATIVE PERCENT: 67.4 % (ref 36–66)
SODIUM BLD-SCNC: 144 MMOL/L (ref 135–145)
SOURCE: NORMAL
SPECIMEN: NORMAL
TOTAL IMMATURE NEUTOROPHIL: 0.09 K/CU MM
TOTAL NUCLEATED RBC: 0.1 K/CU MM
TOTAL PROTEIN: 5.9 GM/DL (ref 6.4–8.2)
WBC # BLD: 8.7 K/CU MM (ref 4–10.5)

## 2020-07-08 PROCEDURE — 96375 TX/PRO/DX INJ NEW DRUG ADDON: CPT

## 2020-07-08 PROCEDURE — 82962 GLUCOSE BLOOD TEST: CPT

## 2020-07-08 PROCEDURE — 2140000000 HC CCU INTERMEDIATE R&B

## 2020-07-08 PROCEDURE — 6370000000 HC RX 637 (ALT 250 FOR IP): Performed by: INTERNAL MEDICINE

## 2020-07-08 PROCEDURE — 80202 ASSAY OF VANCOMYCIN: CPT

## 2020-07-08 PROCEDURE — 94761 N-INVAS EAR/PLS OXIMETRY MLT: CPT

## 2020-07-08 PROCEDURE — 6370000000 HC RX 637 (ALT 250 FOR IP): Performed by: NURSE PRACTITIONER

## 2020-07-08 PROCEDURE — 85025 COMPLETE CBC W/AUTO DIFF WBC: CPT

## 2020-07-08 PROCEDURE — 2500000003 HC RX 250 WO HCPCS: Performed by: EMERGENCY MEDICINE

## 2020-07-08 PROCEDURE — 36415 COLL VENOUS BLD VENIPUNCTURE: CPT

## 2020-07-08 PROCEDURE — 76937 US GUIDE VASCULAR ACCESS: CPT

## 2020-07-08 PROCEDURE — 6360000002 HC RX W HCPCS: Performed by: NURSE PRACTITIONER

## 2020-07-08 PROCEDURE — 80053 COMPREHEN METABOLIC PANEL: CPT

## 2020-07-08 PROCEDURE — 6360000002 HC RX W HCPCS: Performed by: INTERNAL MEDICINE

## 2020-07-08 PROCEDURE — 83605 ASSAY OF LACTIC ACID: CPT

## 2020-07-08 PROCEDURE — 2580000003 HC RX 258: Performed by: NURSE PRACTITIONER

## 2020-07-08 PROCEDURE — 96366 THER/PROPH/DIAG IV INF ADDON: CPT

## 2020-07-08 PROCEDURE — 96376 TX/PRO/DX INJ SAME DRUG ADON: CPT

## 2020-07-08 PROCEDURE — 2580000003 HC RX 258: Performed by: INTERNAL MEDICINE

## 2020-07-08 PROCEDURE — G0378 HOSPITAL OBSERVATION PER HR: HCPCS

## 2020-07-08 PROCEDURE — 82565 ASSAY OF CREATININE: CPT

## 2020-07-08 RX ORDER — DEXTROSE AND SODIUM CHLORIDE 5; .45 G/100ML; G/100ML
INJECTION, SOLUTION INTRAVENOUS CONTINUOUS
Status: DISCONTINUED | OUTPATIENT
Start: 2020-07-08 | End: 2020-07-08

## 2020-07-08 RX ORDER — DIGOXIN 125 MCG
125 TABLET ORAL DAILY
Status: DISCONTINUED | OUTPATIENT
Start: 2020-07-08 | End: 2020-07-10 | Stop reason: HOSPADM

## 2020-07-08 RX ORDER — METOPROLOL TARTRATE 50 MG/1
50 TABLET, FILM COATED ORAL 2 TIMES DAILY
Status: DISCONTINUED | OUTPATIENT
Start: 2020-07-08 | End: 2020-07-10 | Stop reason: HOSPADM

## 2020-07-08 RX ORDER — DEXTROSE AND SODIUM CHLORIDE 5; .45 G/100ML; G/100ML
INJECTION, SOLUTION INTRAVENOUS CONTINUOUS
Status: ACTIVE | OUTPATIENT
Start: 2020-07-08 | End: 2020-07-08

## 2020-07-08 RX ORDER — DIGOXIN 0.25 MG/ML
250 INJECTION INTRAMUSCULAR; INTRAVENOUS EVERY 4 HOURS
Status: COMPLETED | OUTPATIENT
Start: 2020-07-08 | End: 2020-07-08

## 2020-07-08 RX ORDER — LEVOFLOXACIN 500 MG/1
250 TABLET, FILM COATED ORAL DAILY
Status: DISCONTINUED | OUTPATIENT
Start: 2020-07-08 | End: 2020-07-09

## 2020-07-08 RX ADMIN — DICYCLOMINE HYDROCHLORIDE 10 MG: 10 CAPSULE ORAL at 16:22

## 2020-07-08 RX ADMIN — DEXTROSE AND SODIUM CHLORIDE: 5; 450 INJECTION, SOLUTION INTRAVENOUS at 14:18

## 2020-07-08 RX ADMIN — PANTOPRAZOLE SODIUM 40 MG: 40 TABLET, DELAYED RELEASE ORAL at 05:50

## 2020-07-08 RX ADMIN — METOPROLOL TARTRATE 50 MG: 50 TABLET, FILM COATED ORAL at 20:07

## 2020-07-08 RX ADMIN — DIGOXIN 250 MCG: 0.25 INJECTION INTRAMUSCULAR; INTRAVENOUS at 16:23

## 2020-07-08 RX ADMIN — LEVOFLOXACIN 250 MG: 500 TABLET, FILM COATED ORAL at 16:28

## 2020-07-08 RX ADMIN — DIGOXIN 250 MCG: 0.25 INJECTION INTRAMUSCULAR; INTRAVENOUS at 20:07

## 2020-07-08 RX ADMIN — MIDODRINE HYDROCHLORIDE 10 MG: 5 TABLET ORAL at 16:22

## 2020-07-08 RX ADMIN — DICYCLOMINE HYDROCHLORIDE 10 MG: 10 CAPSULE ORAL at 11:36

## 2020-07-08 RX ADMIN — DONEPEZIL HYDROCHLORIDE 10 MG: 10 TABLET, FILM COATED ORAL at 20:07

## 2020-07-08 RX ADMIN — MIDODRINE HYDROCHLORIDE 10 MG: 5 TABLET ORAL at 09:03

## 2020-07-08 RX ADMIN — DEXTROSE MONOHYDRATE 5 MG/HR: 50 INJECTION, SOLUTION INTRAVENOUS at 17:31

## 2020-07-08 RX ADMIN — MEMANTINE 10 MG: 10 TABLET ORAL at 20:07

## 2020-07-08 RX ADMIN — DEXTROSE MONOHYDRATE 15 MG/HR: 50 INJECTION, SOLUTION INTRAVENOUS at 09:03

## 2020-07-08 RX ADMIN — ASPIRIN 81 MG CHEWABLE TABLET 81 MG: 81 TABLET CHEWABLE at 09:03

## 2020-07-08 RX ADMIN — CEFEPIME HYDROCHLORIDE 2 G: 2 INJECTION, POWDER, FOR SOLUTION INTRAVENOUS at 04:44

## 2020-07-08 RX ADMIN — MIDODRINE HYDROCHLORIDE 10 MG: 5 TABLET ORAL at 11:35

## 2020-07-08 RX ADMIN — APIXABAN 2.5 MG: 2.5 TABLET, FILM COATED ORAL at 20:07

## 2020-07-08 RX ADMIN — MEMANTINE 10 MG: 10 TABLET ORAL at 09:04

## 2020-07-08 RX ADMIN — DICYCLOMINE HYDROCHLORIDE 10 MG: 10 CAPSULE ORAL at 05:50

## 2020-07-08 RX ADMIN — DEXTROSE AND SODIUM CHLORIDE: 5; 450 INJECTION, SOLUTION INTRAVENOUS at 09:03

## 2020-07-08 RX ADMIN — DIGOXIN 250 MCG: 0.25 INJECTION INTRAMUSCULAR; INTRAVENOUS at 11:35

## 2020-07-08 RX ADMIN — METOPROLOL TARTRATE 50 MG: 50 TABLET, FILM COATED ORAL at 09:04

## 2020-07-08 RX ADMIN — MULTIPLE VITAMINS W/ MINERALS TAB 1 TABLET: TAB at 09:04

## 2020-07-08 ASSESSMENT — PAIN SCALES - GENERAL
PAINLEVEL_OUTOF10: 0
PAINLEVEL_OUTOF10: 0

## 2020-07-08 NOTE — PLAN OF CARE
Problem: Pain:  Goal: Pain level will decrease  Description: Pain level will decrease  7/8/2020 1825 by Julia Noble RN  Outcome: Ongoing  7/8/2020 1100 by Dot Matute RN  Outcome: Ongoing  Goal: Control of acute pain  Description: Control of acute pain  7/8/2020 1825 by Julia Noble RN  Outcome: Ongoing  7/8/2020 1100 by Dot Matute RN  Outcome: Ongoing  Goal: Control of chronic pain  Description: Control of chronic pain  7/8/2020 1825 by Julia Noble RN  Outcome: Ongoing  7/8/2020 1100 by Dot Matute RN  Outcome: Ongoing     Problem: Falls - Risk of:  Goal: Will remain free from falls  Description: Will remain free from falls  7/8/2020 1825 by Julia Noble RN  Outcome: Ongoing  7/8/2020 1100 by Dot Matute RN  Outcome: Ongoing  Goal: Absence of physical injury  Description: Absence of physical injury  7/8/2020 1825 by Julia Noble RN  Outcome: Ongoing  7/8/2020 1100 by Dot Matute RN  Outcome: Ongoing     Problem: Skin Integrity:  Goal: Will show no infection signs and symptoms  Description: Will show no infection signs and symptoms  7/8/2020 1825 by Julia Noble RN  Outcome: Ongoing  7/8/2020 1100 by Dot Matute RN  Outcome: Ongoing  Goal: Absence of new skin breakdown  Description: Absence of new skin breakdown  7/8/2020 1825 by Julia Noble RN  Outcome: Ongoing  7/8/2020 1100 by Dot Matute RN  Outcome: Ongoing     Problem: Cardiac:  Goal: Ability to maintain an adequate cardiac output will improve  Description: Ability to maintain an adequate cardiac output will improve  7/8/2020 1825 by Julia Noble RN  Outcome: Ongoing  7/8/2020 1100 by Dot Matute RN  Outcome: Ongoing  Goal: Hemodynamic stability will improve  Description: Hemodynamic stability will improve  7/8/2020 1825 by Julia Noble RN  Outcome: Ongoing  7/8/2020 1100 by Dot Matute RN  Outcome: Ongoing     Problem: Fluid Volume:  Goal: Ability to achieve and maintain adequate urine output will improve  Description: Ability to achieve and maintain adequate urine output will improve  7/8/2020 1825 by Charito Ken RN  Outcome: Ongoing  7/8/2020 1100 by Carmina Sullivan RN  Outcome: Ongoing     Problem: Respiratory:  Goal: Respiratory status will improve  Description: Respiratory status will improve  7/8/2020 1825 by Charito Ken RN  Outcome: Ongoing  7/8/2020 1100 by Carmina Sullivan RN  Outcome: Ongoing

## 2020-07-08 NOTE — PROGRESS NOTES
This RN notified Dr Ling Gupta regarding patients HR maintaining 58-65bpm with cardizem gtt going at 5mg/hr. Dr Ling Gupta ordered for this RN to stop the gtt at this time. This RN will notify Sarai Villa and Qing Og patients primary RN.

## 2020-07-08 NOTE — PROGRESS NOTES
[id number] [facility] SPEECH/LANGUAGE PATHOLOGY    Deniacarlotta Morocho  7/8/2020  [id number]    Attempted to see [facility] for dysphagia follow-up. Pt declines participation at this time. Spoke with RN, who denies concerns with pt's tolerance of current diet. SLP will reattempt as able.     Macie Barker MA, CCC-SLP  7/8/2020  12:57 PM

## 2020-07-08 NOTE — CONSULTS
IV Consult complete. [employer] Pressure Injectable PIV inserted in right FA x1 attempt using sterile, ultrasound guided technique. Brisk blood return, flushes easily. Jose CANO notified.

## 2020-07-08 NOTE — PROGRESS NOTES
Patient's wife informed of transfer to [address] [place] [address] and updated her on patient condition. Wife was provided with the phone number for the patient's room and the [address] [facility] [address] station.

## 2020-07-08 NOTE — PLAN OF CARE
Problem: Pain:  Goal: Pain level will decrease  Description: Pain level will decrease  7/8/2020 1100 by Gavin Kaufman RN  Outcome: Ongoing  7/8/2020 0026 by Mark Kim RN  Outcome: Ongoing  Goal: Control of acute pain  Description: Control of acute pain  7/8/2020 1100 by Gavin Kaufman RN  Outcome: Ongoing  7/8/2020 0026 by Mark Kim RN  Outcome: Ongoing  Goal: Control of chronic pain  Description: Control of chronic pain  7/8/2020 1100 by Gavin Kaufman RN  Outcome: Ongoing  7/8/2020 0026 by Mark Kim RN  Outcome: Ongoing

## 2020-07-08 NOTE — PROGRESS NOTES
Cardiology Progress Note       Carlos Brooke is a 80 y.o. male   1935     SUBJECTIVE:   Patient  Seen  And  Examined    Somnolent   Still  In  Atrial  Fib  With  RVR    OBJECTIVE:    Review of Systems:  General appearance: alert, appears stated age and cooperative  Skin: Skin color, texture, normal. No rashes or lesions  HEENT: No nose bleed, headache, vision problems  CV: C/O chest pain, tightness, pressure,   Respiratory: C/o no SOB, RANDLE, Orthopnea, PND  GI: No abdominal pain, black stool, bloating  Limbs: No c/o edema, pain, swelling, intermittent claudication, joint pains  Neuro: No dizziness, lightheadedness, syncope, gait problems, memory problems  Psych: grossly normal. No SI/depression. Vitals:   Blood pressure 103/75, pulse 134, temperature 98.3 °F (36.8 °C), temperature source Rectal, resp. rate 20, height 5' 10\" (1.778 m), weight 162 lb (73.5 kg), SpO2 99 %. HEENT: AT, NC, PERRLA  Neck: No JVD  Heart: S1 S2 audible, no murmur   Lungs: CTA   Abdomen: Nontender   Limbs: No edema   CNS: no focal deficit      Past Medical History:   Diagnosis Date    A-fib [facility])     per spouse    Anesthesia complication     becomes combative and confused s/p anesthesia. Needed a strait jacket s/p back surgery.     Arthritis     hands, back    Atrial fibrillation (HCC)     Back pain     Blood transfusion     CAD (coronary artery disease)     Cancer (HCC)     Top of head-frozen off    Dementia ([address]     Dementia ([address]     per spouse    Diabetes mellitus ([address]     Histoplasmosis     x2 last time in 2005    History of blood transfusion     Hyperlipidemia     Hypertension     Nerve damage     bilat feet s/p back surgery        Patient Active Problem List   Diagnosis    Essential hypertension, benign    Other and unspecified hyperlipidemia    Coronary atherosclerosis    Type 2 diabetes mellitus without complication (HCC)    Neuropathy    Diarrhea    PNA (pneumonia)    HCAP (healthcare-associated pneumonia)    Suspected COVID-19 virus infection    Acute encephalopathy    Atrial fibrillation with RVR (HCC)        Allergies   Allergen Reactions    Trazodone And Nefazodone      Became restless and hallucinating    Azithromycin      From old chart    Demerol Hcl [Meperidine] Other (See Comments)     Low BP    Erythromycin Other (See Comments)     Blurred vision    Trazodone And Nefazodone Other (See Comments)     Restlessness and Hallucinations    Demerol Other (See Comments)     Low blood pressure    Erythromycin Other (See Comments)     Blurred vision    Zithromax [Azithromycin Dihydrate] Other (See Comments)     Patient is unsure        Current Inpatient Medications:    Current Facility-Administered Medications   Medication Dose Route Frequency Provider Last Rate Last Dose    metoprolol tartrate (LOPRESSOR) tablet 50 mg  50 mg Oral BID Leonard Martinez MD   50 mg at 07/08/20 0904    dextrose 5 % and 0.45 % sodium chloride infusion   Intravenous Continuous Dallin Orona MD 75 mL/hr at 07/08/20 0903      digoxin (LANOXIN) injection 250 mcg  250 mcg Intravenous Q4H Marcell Quinones MD        digoxin (LANOXIN) tablet 125 mcg  125 mcg Oral Daily Marcell Quinones MD        midodrine (PROAMATINE) tablet 10 mg  10 mg Oral TID WC Milvia Quick MD   10 mg at 07/08/20 0903    dilTIAZem 100 mg in dextrose 5 % 100 mL infusion (ADD-Terrell)  5 mg/hr Intravenous Continuous Ward Ghosh DO 15 mL/hr at 07/08/20 0903 15 mg/hr at 07/08/20 8114    acetaminophen (TYLENOL) tablet 650 mg  650 mg Oral Q6H PRN Lurlean Po, APRN - CNP        Or    acetaminophen (TYLENOL) suppository 650 mg  650 mg Rectal Q6H PRN Lurlean Po, APRN - CNP        [Held by provider] apixaban (ELIQUIS) tablet 2.5 mg  2.5 mg Oral BID Lurlean Po, APRN - CNP   Stopped at 07/07/20 2100    aspirin chewable tablet 81 mg  81 mg Oral Daily Yissel Morrow, APRN - CNP   81 mg at 07/08/20 0903    dicyclomine (BENTYL) capsule 10 mg  10 mg Oral TID AC Kory Moat, APRN - CNP   10 mg at 07/08/20 0550    donepezil (ARICEPT) tablet 10 mg  10 mg Oral Nightly Kory Moat, APRN - CNP   10 mg at 07/07/20 2103    memantine (NAMENDA) tablet 10 mg  10 mg Oral BID Kory Moat, APRN - CNP   10 mg at 07/08/20 0904    tamsulosin (FLOMAX) capsule 0.4 mg  0.4 mg Oral Nightly Kory Moat, APRN - CNP   0.4 mg at 07/07/20 2103    pantoprazole (PROTONIX) tablet 40 mg  40 mg Oral QAM AC Kory Moat, APRN - CNP   40 mg at 07/08/20 0550    therapeutic multivitamin-minerals 1 tablet  1 tablet Oral Daily Kory Moat, APRN - CNP   1 tablet at 07/08/20 0904    sodium chloride flush 0.9 % injection 10 mL  10 mL Intravenous 2 times per day Kory Moat, APRN - CNP   Stopped at 07/08/20 0904    sodium chloride flush 0.9 % injection 10 mL  10 mL Intravenous PRN Kory Moat, APRN - CNP        acetaminophen (TYLENOL) tablet 650 mg  650 mg Oral Q6H PRN Kory Moat, APRN - CNP        Or    acetaminophen (TYLENOL) suppository 650 mg  650 mg Rectal Q6H PRN Kory Moat, APRN - CNP        cefepime (MAXIPIME) 2 g IVPB minibag  2 g Intravenous Q12H Kory Moat, APRN - CNP   Stopped at 07/08/20 0523    insulin lispro (HUMALOG) injection vial 0-12 Units  0-12 Units Subcutaneous TID WC Kory Moat, APRN - CNP   2 Units at 07/08/20 0836    insulin lispro (HUMALOG) injection vial 0-6 Units  0-6 Units Subcutaneous Nightly Yissel BRETT Morrow - CNP        glucose (GLUTOSE) 40 % oral gel 15 g  15 g Oral PRN Yisselcarli Morrow, APRN - CNP        dextrose 50 % IV solution  12.5 g Intravenous PRN Yissel Morrow, APRN - CNP        glucagon (rDNA) injection 1 mg  1 mg Intramuscular PRN Yissel Morrow, APRN - CNP        dextrose 5 % solution  100 mL/hr Intravenous PRN Yissel Cristhian, APRN - CNP        vancomycin (VANCOCIN) 1,250 mg in dextrose 5 % 250 mL IVPB  1,250 mg Intravenous Q24H BRETT Mora - CNP   Stopped at 07/07/20 1833           Labs:  CBC with Differential:    Lab Results   Component Value Date    WBC 10.7 07/07/2020    RBC 3.94 07/07/2020    HGB 12.4 07/07/2020    HCT 40.4 07/07/2020     07/07/2020    .5 07/07/2020    MCH 31.5 07/07/2020    MCHC 30.7 07/07/2020    RDW 21.5 07/07/2020    NRBC 3 07/06/2020    SEGSPCT 67.4 07/07/2020    BANDSPCT 1 07/06/2020    LYMPHOPCT 21.7 07/07/2020    MONOPCT 9.8 07/07/2020    MYELOPCT 1 06/13/2016    EOSPCT 1.4 02/13/2012    BASOPCT 0.3 07/07/2020    MONOSABS 1.1 07/07/2020    LYMPHSABS 2.3 07/07/2020    EOSABS 0.0 07/07/2020    BASOSABS 0.0 07/07/2020    DIFFTYPE AUTOMATED DIFFERENTIAL 07/07/2020     CMP:    Lab Results   Component Value Date     07/08/2020    K 4.5 07/08/2020     07/08/2020    CO2 16 07/08/2020    BUN 52 07/08/2020    CREATININE 1.2 07/08/2020    GFRAA >60 07/08/2020    LABGLOM 58 07/08/2020    GLUCOSE 145 07/08/2020    PROT 5.9 07/08/2020    LABALBU 3.4 07/08/2020    CALCIUM 8.6 07/08/2020    BILITOT 1.8 07/08/2020    ALKPHOS 51 07/08/2020    AST 43 07/08/2020    ALT 36 07/08/2020     Hepatic Function Panel:    Lab Results   Component Value Date    ALKPHOS 51 07/08/2020    ALT 36 07/08/2020    AST 43 07/08/2020    PROT 5.9 07/08/2020    BILITOT 1.8 07/08/2020    LABALBU 3.4 07/08/2020     Magnesium:    Lab Results   Component Value Date    MG 2.8 07/07/2020     PT/INR:    Lab Results   Component Value Date    PROTIME 15.2 07/07/2020    PROTIME 18.2 03/19/2012    INR 1.25 07/07/2020     Last 3 Troponin:  No results found for: TROPONINI  U/A:    Lab Results   Component Value Date    NITRITE neg 06/12/2013    COLORU YELLOW 07/06/2020    WBCUA NONE SEEN 07/06/2020    RBCUA 614 07/06/2020    MUCUS RARE 07/06/2020    TRICHOMONAS NONE SEEN 07/06/2020    BACTERIA NEGATIVE 07/06/2020    CLARITYU HAZY 07/06/2020    SPECGRAV 1.020 07/06/2020    LEUKOCYTESUR NEGATIVE 07/06/2020    UROBILINOGEN NORMAL 07/06/2020    BILIRUBINUR NEGATIVE 07/06/2020

## 2020-07-08 NOTE — PROGRESS NOTES
Hospitalist Progress Note      Name:  Laurie Storm /Age/Sex: 1935  (80 y.o. male)   MRN & CSN:  [id number] & [id number] Admission Date/Time: 2020  2:08 PM   Location:  [address] [place] [address] PCP: Tamie Burrows MD         Hospital Day: 3    Assessment and Plan:   Laurie Storm is a 80 y.o.  male  who presents with Acute encephalopathy    --- Acute encephalopathy, metabolic. Better today. More awake and alert. Oriented to person and place. ?  Worsening of underlying dementia. CT head -negative for acute abnormality. Plan  Continue to monitor mental status. --- Probable pneumonia, viral vs bacterial.  Chest x-ray: Barely perceptible left lower lobe interstitial infiltrate. Respiratory disease panel PCR negative. COVID-19 neg. MRSA nasal culture neg. Strep pneumonia and Legionella urine antigen negative. procal 0.37,   Blood cultures pending. Plan  Stop vancomycin and cefepime. PO levaquin X 5 days  Transfer out of COVID unit. --- Atrial fibrillation with RVR [[id number]). On chronic anticoagulation with apixaban. Plan  Continue Cardizem drip and apixaban. Continue sotalol. Cardiology on board. Wants to add Digoxin today. --- Mild oral stage dysphagia - dysphagia III diet with nectar thick liquids, aspiration precautions per SLP. --- Hyponatremia (150) - D5 1/2 NS for 6 hrs. Other diagnoses  Dementia-continue donepezil and memantine. BPH- tamsulosin  CAD s/p CABG  Hx pf MV repair  Type II DM -sliding scale. Diet DIET GENERAL; Dysphagia Soft and Bite-Sized; Mildly Thick (Nectar)   DVT Prophylaxis [x] chronic anticoagulation with apixaban   GI Prophylaxis [x] PPI,  [] H2 Blocker,  [] Carafate,  [] Diet/Tube Feeds   Code Status DNR-CC   Disposition  to be determined   MDM [] Low, [x] Moderate,[]  High     History of Present Illness:     Patient seen and examined. Mental status better today. More alert, oriented to person and place. Still in A. fib with RVR. Ten point ROS reviewed negative, unless as noted above    Objective: Intake/Output Summary (Last 24 hours) at 7/8/2020 1334  Last data filed at 7/8/2020 0440  Gross per 24 hour   Intake 320 ml   Output 1000 ml   Net -680 ml      Vitals:   Vitals:    07/08/20 1302   BP: 110/76   Pulse: 110   Resp: 20   Temp: 98.2 °F (36.8 °C)   SpO2:      Physical Exam:   GEN Awake male, lying in bed. RESP breathing comfortably on room air. CARDIO/VASC S1/S2 auscultated. Irregularly irregular tachycardia. No peripheral edema. GI Abdomen is soft without significant tenderness, masses, or guarding. Bowel sounds are normoactive.  Jamison catheter is not present. MSK No gross joint deformities. SKIN Normal coloration, warm, dry. NEURO Cranial nerves appear grossly intact, garbled speech. PSYCH Awake, alert, oriented to person and place.     Medications:   Medications:    metoprolol tartrate  50 mg Oral BID    digoxin  250 mcg Intravenous Q4H    digoxin  125 mcg Oral Daily    midodrine  10 mg Oral TID WC    [Held by provider] apixaban  2.5 mg Oral BID    aspirin  81 mg Oral Daily    dicyclomine  10 mg Oral TID AC    donepezil  10 mg Oral Nightly    memantine  10 mg Oral BID    tamsulosin  0.4 mg Oral Nightly    pantoprazole  40 mg Oral QAM AC    therapeutic multivitamin-minerals  1 tablet Oral Daily    sodium chloride flush  10 mL Intravenous 2 times per day    cefepime  2 g Intravenous Q12H    insulin lispro  0-12 Units Subcutaneous TID WC    insulin lispro  0-6 Units Subcutaneous Nightly    vancomycin  1,250 mg Intravenous Q24H      Infusions:    dextrose 5 % and 0.45 % NaCl 75 mL/hr at 07/08/20 0903    dilTIAZem 15 mg/hr (07/08/20 0903)    dextrose       PRN Meds: acetaminophen, 650 mg, Q6H PRN    Or  acetaminophen, 650 mg, Q6H PRN  sodium chloride flush, 10 mL, PRN  acetaminophen, 650 mg, Q6H PRN    Or  acetaminophen, 650 mg, Q6H PRN  glucose, 15 g, PRN  dextrose, 12.5 g, PRN  glucagon (rDNA), 1 mg, PRN  dextrose, 100 mL/hr, PRN        CBC   Recent Labs     07/06/20  1430 07/07/20  0445   WBC 10.7* 10.7*   HGB 15.9 12.4*   HCT 49.6 40.4*   * 290      BMP   Recent Labs     07/07/20  1100 07/08/20  0929   * 144   K 4.5 4.5   * 113*   CO2 20* 16*   BUN 69* 52*   CREATININE 1.7* 1.2       Radiology report reviewed     Electronically signed by Natan Davis MD on 7/8/2020 at 1:34 PM

## 2020-07-08 NOTE — PROGRESS NOTES
PHARMACY VANCOMYCIN MONITORING SERVICE    Dawson Cox is a 80 y.o. male started on vancomycin for pneumonia. Pharmacy consulted by Dr. Naya Humphreys / NP Shelli Duncan for monitoring and adjustment. Indication for treatment: Pneumonia  Goal trough: 15 mcg/mL  Other antimicrobials: Cefepime    Ht Readings from Last 1 Encounters:   07/06/20 5' 10\" (1.778 m)     Wt Readings from Last 3 Encounters:   07/08/20 162 lb (73.5 kg)   06/24/20 165 lb 3.2 oz (74.9 kg)   03/11/20 183 lb 1.6 oz (83.1 kg)        Pertinent Laboratory Values:   Temp Readings from Last 3 Encounters:   07/08/20 98.2 °F (36.8 °C) (Oral)   06/24/20 98.1 °F (36.7 °C) (Oral)   03/12/20 98.1 °F (36.7 °C) (Oral)     Recent Labs     07/06/20  1430 07/07/20  0445   WBC 10.7* 10.7*   LACTATE 5.9* 2.7*     Recent Labs     07/07/20  1100 07/08/20  0929   BUN 69* 52*   CREATININE 1.7* 1.2     Estimated Creatinine Clearance: 46 mL/min (based on SCr of 1.2 mg/dL). Intake/Output Summary (Last 24 hours) at 7/8/2020 1430  Last data filed at 7/8/2020 0440  Gross per 24 hour   Intake 320 ml   Output 1000 ml   Net -680 ml       Pertinent Cultures:  Date    Source    Results  7/6/2020  Blood    Negative  7/6/2020  Resp Dz Panel  Negative  7/6/2020  Strep pneumo/legionella Negative   7/6/2020  MRSA Nasal Screen   Negative     Previous vancomycin levels:  TROUGH:  No results for input(s): VANCOTROUGH in the last 72 hours. RANDOM:  No results for input(s): VANCORANDOM in the last 72 hours. Assessment:  · WBC and temperature: WBC elevated;  Afebrile  · SCr, BUN, and urine output: HUMBERTO improving, appears to be at baseline now   · Day(s) of therapy: #3  · Vancomycin level: to be collected    Plan:  · Vancomycin 1500 mg x 1 followed by 1250 mg q24h  · Plan to check a trough prior to 4th dose  · Pharmacy will continue to monitor patient and adjust therapy as indicated    REPEAT VANCOMYCIN TROUGH SCHEDULED FOR 7/9 @ 4035    Thank you for the consult,  Jarad Rodriguez , PharmD, BCPS

## 2020-07-08 NOTE — PROGRESS NOTES
Called lab to tell them that nurse Allie RN and I both tried to get labs on this patient this am and were unable to do so. She said she would send a phlebot up to get his labs this am. Will continue to monitor.

## 2020-07-09 LAB
ALBUMIN SERPL-MCNC: 3.1 GM/DL (ref 3.4–5)
ALP BLD-CCNC: 52 IU/L (ref 40–128)
ALT SERPL-CCNC: 31 U/L (ref 10–40)
ANION GAP SERPL CALCULATED.3IONS-SCNC: 8 MMOL/L (ref 4–16)
AST SERPL-CCNC: 30 IU/L (ref 15–37)
BASOPHILS ABSOLUTE: 0 K/CU MM
BASOPHILS RELATIVE PERCENT: 0.1 % (ref 0–1)
BILIRUB SERPL-MCNC: 1.4 MG/DL (ref 0–1)
BUN BLDV-MCNC: 36 MG/DL (ref 6–23)
CALCIUM SERPL-MCNC: 8.6 MG/DL (ref 8.3–10.6)
CHLORIDE BLD-SCNC: 116 MMOL/L (ref 99–110)
CO2: 24 MMOL/L (ref 21–32)
CREAT SERPL-MCNC: 1.2 MG/DL (ref 0.9–1.3)
DIFFERENTIAL TYPE: ABNORMAL
EOSINOPHILS ABSOLUTE: 0.3 K/CU MM
EOSINOPHILS RELATIVE PERCENT: 3.4 % (ref 0–3)
GFR AFRICAN AMERICAN: >60 ML/MIN/1.73M2
GFR NON-AFRICAN AMERICAN: 58 ML/MIN/1.73M2
GLUCOSE BLD-MCNC: 106 MG/DL (ref 70–99)
GLUCOSE BLD-MCNC: 114 MG/DL (ref 70–99)
GLUCOSE BLD-MCNC: 115 MG/DL (ref 70–99)
GLUCOSE BLD-MCNC: 136 MG/DL (ref 70–99)
GLUCOSE BLD-MCNC: 182 MG/DL (ref 70–99)
HCT VFR BLD CALC: 34.8 % (ref 42–52)
HEMOGLOBIN: 10.8 GM/DL (ref 13.5–18)
IMMATURE NEUTROPHIL %: 1.2 % (ref 0–0.43)
LACTATE: 2.5 MMOL/L (ref 0.4–2)
LYMPHOCYTES ABSOLUTE: 2 K/CU MM
LYMPHOCYTES RELATIVE PERCENT: 26 % (ref 24–44)
MCH RBC QN AUTO: 31.9 PG (ref 27–31)
MCHC RBC AUTO-ENTMCNC: 31 % (ref 32–36)
MCV RBC AUTO: 102.7 FL (ref 78–100)
MONOCYTES ABSOLUTE: 0.5 K/CU MM
MONOCYTES RELATIVE PERCENT: 6.9 % (ref 0–4)
NUCLEATED RBC %: 0.7 %
PDW BLD-RTO: 21.2 % (ref 11.7–14.9)
PLATELET # BLD: 220 K/CU MM (ref 140–440)
PMV BLD AUTO: 11 FL (ref 7.5–11.1)
POTASSIUM SERPL-SCNC: 4.4 MMOL/L (ref 3.5–5.1)
RBC # BLD: 3.39 M/CU MM (ref 4.6–6.2)
SEGMENTED NEUTROPHILS ABSOLUTE COUNT: 4.7 K/CU MM
SEGMENTED NEUTROPHILS RELATIVE PERCENT: 62.4 % (ref 36–66)
SODIUM BLD-SCNC: 148 MMOL/L (ref 135–145)
TOTAL IMMATURE NEUTOROPHIL: 0.09 K/CU MM
TOTAL NUCLEATED RBC: 0.1 K/CU MM
TOTAL PROTEIN: 5.3 GM/DL (ref 6.4–8.2)
WBC # BLD: 7.6 K/CU MM (ref 4–10.5)

## 2020-07-09 PROCEDURE — 82962 GLUCOSE BLOOD TEST: CPT

## 2020-07-09 PROCEDURE — 2580000003 HC RX 258: Performed by: PHYSICIAN ASSISTANT

## 2020-07-09 PROCEDURE — 80053 COMPREHEN METABOLIC PANEL: CPT

## 2020-07-09 PROCEDURE — 2580000003 HC RX 258: Performed by: NURSE PRACTITIONER

## 2020-07-09 PROCEDURE — 85025 COMPLETE CBC W/AUTO DIFF WBC: CPT

## 2020-07-09 PROCEDURE — 92526 ORAL FUNCTION THERAPY: CPT

## 2020-07-09 PROCEDURE — 94761 N-INVAS EAR/PLS OXIMETRY MLT: CPT

## 2020-07-09 PROCEDURE — 6370000000 HC RX 637 (ALT 250 FOR IP): Performed by: INTERNAL MEDICINE

## 2020-07-09 PROCEDURE — 82565 ASSAY OF CREATININE: CPT

## 2020-07-09 PROCEDURE — 36415 COLL VENOUS BLD VENIPUNCTURE: CPT

## 2020-07-09 PROCEDURE — 6370000000 HC RX 637 (ALT 250 FOR IP): Performed by: HOSPITALIST

## 2020-07-09 PROCEDURE — 2140000000 HC CCU INTERMEDIATE R&B

## 2020-07-09 PROCEDURE — 96376 TX/PRO/DX INJ SAME DRUG ADON: CPT

## 2020-07-09 PROCEDURE — G0378 HOSPITAL OBSERVATION PER HR: HCPCS

## 2020-07-09 PROCEDURE — 6370000000 HC RX 637 (ALT 250 FOR IP): Performed by: PHYSICIAN ASSISTANT

## 2020-07-09 PROCEDURE — 6370000000 HC RX 637 (ALT 250 FOR IP): Performed by: NURSE PRACTITIONER

## 2020-07-09 PROCEDURE — 83605 ASSAY OF LACTIC ACID: CPT

## 2020-07-09 PROCEDURE — 97162 PT EVAL MOD COMPLEX 30 MIN: CPT

## 2020-07-09 PROCEDURE — 97530 THERAPEUTIC ACTIVITIES: CPT

## 2020-07-09 RX ORDER — GABAPENTIN 100 MG/1
100 CAPSULE ORAL 3 TIMES DAILY
Status: DISCONTINUED | OUTPATIENT
Start: 2020-07-09 | End: 2020-07-10 | Stop reason: HOSPADM

## 2020-07-09 RX ORDER — CEFUROXIME AXETIL 250 MG/1
500 TABLET ORAL EVERY 12 HOURS SCHEDULED
Status: DISCONTINUED | OUTPATIENT
Start: 2020-07-09 | End: 2020-07-10 | Stop reason: HOSPADM

## 2020-07-09 RX ORDER — DEXTROSE MONOHYDRATE 50 MG/ML
INJECTION, SOLUTION INTRAVENOUS CONTINUOUS
Status: DISCONTINUED | OUTPATIENT
Start: 2020-07-09 | End: 2020-07-10 | Stop reason: HOSPADM

## 2020-07-09 RX ADMIN — MEMANTINE 10 MG: 10 TABLET ORAL at 08:46

## 2020-07-09 RX ADMIN — LEVOFLOXACIN 250 MG: 500 TABLET, FILM COATED ORAL at 06:28

## 2020-07-09 RX ADMIN — DICYCLOMINE HYDROCHLORIDE 10 MG: 10 CAPSULE ORAL at 16:32

## 2020-07-09 RX ADMIN — ASPIRIN 81 MG CHEWABLE TABLET 81 MG: 81 TABLET CHEWABLE at 08:43

## 2020-07-09 RX ADMIN — SODIUM CHLORIDE, PRESERVATIVE FREE 10 ML: 5 INJECTION INTRAVENOUS at 10:32

## 2020-07-09 RX ADMIN — MIDODRINE HYDROCHLORIDE 10 MG: 5 TABLET ORAL at 12:02

## 2020-07-09 RX ADMIN — MEMANTINE 10 MG: 10 TABLET ORAL at 20:43

## 2020-07-09 RX ADMIN — CEFUROXIME AXETIL 500 MG: 250 TABLET ORAL at 20:46

## 2020-07-09 RX ADMIN — DICYCLOMINE HYDROCHLORIDE 10 MG: 10 CAPSULE ORAL at 06:28

## 2020-07-09 RX ADMIN — METOPROLOL TARTRATE 50 MG: 50 TABLET, FILM COATED ORAL at 08:43

## 2020-07-09 RX ADMIN — APIXABAN 2.5 MG: 2.5 TABLET, FILM COATED ORAL at 08:45

## 2020-07-09 RX ADMIN — GABAPENTIN 100 MG: 100 CAPSULE ORAL at 14:34

## 2020-07-09 RX ADMIN — APIXABAN 2.5 MG: 2.5 TABLET, FILM COATED ORAL at 20:43

## 2020-07-09 RX ADMIN — DONEPEZIL HYDROCHLORIDE 10 MG: 10 TABLET, FILM COATED ORAL at 20:43

## 2020-07-09 RX ADMIN — DIGOXIN 125 MCG: 125 TABLET ORAL at 08:44

## 2020-07-09 RX ADMIN — DEXTROSE MONOHYDRATE: 50 INJECTION, SOLUTION INTRAVENOUS at 10:32

## 2020-07-09 RX ADMIN — TAMSULOSIN HYDROCHLORIDE 0.4 MG: 0.4 CAPSULE ORAL at 20:43

## 2020-07-09 RX ADMIN — GABAPENTIN 100 MG: 100 CAPSULE ORAL at 20:43

## 2020-07-09 RX ADMIN — DICYCLOMINE HYDROCHLORIDE 10 MG: 10 CAPSULE ORAL at 12:02

## 2020-07-09 RX ADMIN — METOPROLOL TARTRATE 50 MG: 50 TABLET, FILM COATED ORAL at 20:43

## 2020-07-09 RX ADMIN — MULTIPLE VITAMINS W/ MINERALS TAB 1 TABLET: TAB at 08:45

## 2020-07-09 RX ADMIN — CEFUROXIME AXETIL 500 MG: 250 TABLET ORAL at 10:31

## 2020-07-09 RX ADMIN — MIDODRINE HYDROCHLORIDE 10 MG: 5 TABLET ORAL at 08:45

## 2020-07-09 RX ADMIN — MIDODRINE HYDROCHLORIDE 10 MG: 5 TABLET ORAL at 16:32

## 2020-07-09 RX ADMIN — SODIUM CHLORIDE, PRESERVATIVE FREE 10 ML: 5 INJECTION INTRAVENOUS at 20:44

## 2020-07-09 ASSESSMENT — PAIN DESCRIPTION - ORIENTATION: ORIENTATION: RIGHT;LEFT;LOWER

## 2020-07-09 ASSESSMENT — PAIN SCALES - WONG BAKER
WONGBAKER_NUMERICALRESPONSE: 2

## 2020-07-09 ASSESSMENT — PAIN DESCRIPTION - LOCATION: LOCATION: LEG

## 2020-07-09 ASSESSMENT — PAIN DESCRIPTION - PROGRESSION
CLINICAL_PROGRESSION: NOT CHANGED

## 2020-07-09 ASSESSMENT — PAIN SCALES - GENERAL
PAINLEVEL_OUTOF10: 0
PAINLEVEL_OUTOF10: 2
PAINLEVEL_OUTOF10: 0
PAINLEVEL_OUTOF10: 0

## 2020-07-09 ASSESSMENT — PAIN - FUNCTIONAL ASSESSMENT: PAIN_FUNCTIONAL_ASSESSMENT: ACTIVITIES ARE NOT PREVENTED

## 2020-07-09 ASSESSMENT — PAIN DESCRIPTION - PAIN TYPE: TYPE: ACUTE PAIN

## 2020-07-09 ASSESSMENT — PAIN DESCRIPTION - FREQUENCY: FREQUENCY: CONTINUOUS

## 2020-07-09 ASSESSMENT — PAIN DESCRIPTION - DESCRIPTORS: DESCRIPTORS: ACHING

## 2020-07-09 ASSESSMENT — PAIN DESCRIPTION - ONSET: ONSET: ON-GOING

## 2020-07-09 NOTE — PLAN OF CARE
Problem: Pain:  Goal: Pain level will decrease  Description: Pain level will decrease  Outcome: Ongoing  Goal: Control of acute pain  Description: Control of acute pain  Outcome: Ongoing  Goal: Control of chronic pain  Description: Control of chronic pain  Outcome: Ongoing     Problem: Falls - Risk of:  Goal: Will remain free from falls  Description: Will remain free from falls  Outcome: Ongoing  Goal: Absence of physical injury  Description: Absence of physical injury  Outcome: Ongoing     Problem: Skin Integrity:  Goal: Will show no infection signs and symptoms  Description: Will show no infection signs and symptoms  Outcome: Ongoing  Goal: Absence of new skin breakdown  Description: Absence of new skin breakdown  Outcome: Ongoing     Problem: Cardiac:  Goal: Ability to maintain an adequate cardiac output will improve  Description: Ability to maintain an adequate cardiac output will improve  Outcome: Ongoing  Goal: Hemodynamic stability will improve  Description: Hemodynamic stability will improve  Outcome: Ongoing     Problem: Fluid Volume:  Goal: Ability to achieve and maintain adequate urine output will improve  Description: Ability to achieve and maintain adequate urine output will improve  Outcome: Ongoing     Problem: Respiratory:  Goal: Respiratory status will improve  Description: Respiratory status will improve  Outcome: Ongoing

## 2020-07-09 NOTE — PROGRESS NOTES
Physical Therapy  [address] [facility] ACUTE CARE PHYSICAL THERAPY EVALUATION  Dawson Cox, 1935, [id number]/[id number]-A, 7/9/2020    History  [place]:  The primary encounter diagnosis was Altered mental status, unspecified altered mental status type. Diagnoses of Atrial fibrillation with RVR ([facility] Pneumonia due to organism, Lactic acidemia, Septicemia ([facility] and Dementia without behavioral disturbance, unspecified dementia type [facility]) were also pertinent to this visit. Patient  has a past medical history of A-fib [facility]), Anesthesia complication, Arthritis, Atrial fibrillation ([facility] Back pain, Blood transfusion, CAD (coronary artery disease), Cancer ([facility] Dementia ([facility] [address] Dementia ([facility] Diabetes mellitus ([facility] Histoplasmosis, History of blood transfusion, Hyperlipidemia, Hypertension, and Nerve damage. Patient  has a past surgical history that includes back surgery (Last done 2007); Skin graft (1950's); Tonsillectomy (14 years old); Colonoscopy (2009); Appendectomy; other surgical history (06 13 2013); Mitral valve surgery; and Cholecystectomy (12/15/14). Subjective:  Patient states:  \"I can't do this\"   Pain:  Denies   Communication with other providers:  RN   Restrictions: general precautions, fall risk, chair alarm, gao, portable tele, pulse ox, BP cuff     Home Setup/Prior level of function  Pt admitted from SNF. Poor historian and unable to relay much info about his PLOF. Per previous charting, pt was ambulatory with RW and able to perform most ADLs himself with wife assisting when needed. Examination of body systems (includes body structures/functions, activity/participation limitations):  · Observation:  Supine in bed upon arrival. Needed a significant amount of encouragement to participate in therapy. Self limiting. · Vision:  [facility]  · Hearing:  [place]  · Cardiopulmonary:  Stable vitals throughout session on room air. · Cognition: oriented to person and place, disoriented to time.  Dec STM, insight, problem solving. Hx of dementia. See OT/SLP note for further evaluation. Musculoskeletal  · [facility]    · Strength R/L:  BLEs grossly 4-/5, strength deficits observed in function and endurance. · Neuro:  Neuropathy to bilat feet and hands     Mobility/treatment:   · Rolling L/R:  NT   · Supine to sit:  modA for hip guidance and uprighting trunk, HOB elevated ~45 deg. Inc time and cues for sequencing. · Transfers:   · Sit to stand: Ernestina from EOB and recliner. VCs for at least single UE push from seat surface to RW. Dec power, slow transition. · Stand to sit: Ernestina for eccentric control to recliner. VCs for sequencing UEs from RW to seat surface for greater support   · Step pivot:  Ernestina for steadying and device management. VCs for sequencing full pivot turn prior to sitting    · Sitting balance:   SBA to Ernestina at EOB, strong desire to lie back down. Tolerated ~3 minutes. · Standing balance:  CGA to Ernestina at RW. Significant fwd flexed posture. Tolerated ~10 seconds before insisting on sitting down. · Gait: 3ft with RW during transfer CGA to Ernestina for steadying and device management. Shuffled gait with quick steps and fwd posture. Unwilling to attempt further distance at this time. · Educated pt on POC, role of PT, safe DME use, discharge recommendations. VCs for sequencing, posture, UE placement to inc safety and indep with mobility. [facility] 6 Clicks Inpatient Mobility:  AM-PAC Inpatient Mobility Raw Score : 15    Safety: patient left in chair with chair alarm, call light within reach,  gait belt used. Assessment: Body structures, Functions, Activity limitations: Decreased functional mobility ; Decreased safe awareness; Decreased endurance; Decreased balance; Decreased strength; Decreased cognition; Decreased sensation; Decreased posture  Pt is an 80year old male admitted with AMS from SNF. Hx of dementia and has a positive fall history.  Recommend returning to subacute rehab once medically stable. Baseline function unclear but from prior charting, pt was able to ambulate and perform most ADLs with use of RW. He is currently requiring up to [facility] and is limited with activity tolerance. He is functioning below his typical baseline and would benefit from continued therapy to address his current deficits, dec potential fall risk, and restore PLOF. Complexity: Moderate  Prognosis: Good, no significant barriers to participation at this time. Plan Times per week: 3+/week  Discharge Recommendations: Subacute/Skilled Nursing Facility  Equipment: continue to assess at next level of care     Goals:  Short term goals  Time Frame for Short term goals: 1 week   Short term goal 1: Pt will perform sit><supine Ernestina  Short term goal 2: Pt will transfer to all surfaces CGA   Short term goal 3: Pt will ambulate 30ft with RW CGA   Short term goal 4: Pt will tolerate 2 minutes of standing activity with single UE support CGA   Short term goal 5: Pt will tolerate 5 minutes of sitting activity with single UE support SBA        Treatment plan:  Strengthening; Balance Training; Transfer Training; ROM; Functional Mobility Training; ADL/Self-care Training; Endurance Training; Gait Training; Neuromuscular Re-education; Patient/Caregiver Education & Training; Modalities; Home Exercise Program; Safety Education & Training; Equipment Evaluation, Education, & procurement; Positioning; Stair training;  Wheelchair Mobility Training; Manual Therapy - Soft Tissue Mobilization  Recommendations for NURSING mobility: stand step pivot with RW     Time:   Time in: 0850  Time out: 0915  Timed treatment minutes: 12  Total time: 25  Electronically signed by:    Joane Scheuermann, [id number]  7/9/2020, 11:06 AM

## 2020-07-09 NOTE — CARE COORDINATION
Consult received for Hospice. Dr Anthony Andres discussed Hospice with wife this am and she is agreeable to talk with them for information. Referral made to [facility].   TE

## 2020-07-09 NOTE — PROGRESS NOTES
[address] [place] OF SPEECH/LANGUAGE PATHOLOGY  DAILY PROGRESS NOTE  Carlos Brooke  7/9/2020  [id number]  Pneumonia due to organism [J18.9]  Septicemia ([facility] [A41.9]  Atrial fibrillation with RVR ([facility] [I48.91]  Sepsis ([facility] [A41.9]  Altered mental status, unspecified altered mental status type [R41.82]  Lactic acidemia [E87.2]  Dementia without behavioral disturbance, unspecified dementia type (HCC) [F03.90]  Allergies   Allergen Reactions    Trazodone And Nefazodone      Became restless and hallucinating    Azithromycin      From old chart    Demerol Hcl [Meperidine] Other (See Comments)     Low BP    Erythromycin Other (See Comments)     Blurred vision    Trazodone And Nefazodone Other (See Comments)     Restlessness and Hallucinations    Demerol Other (See Comments)     Low blood pressure    Erythromycin Other (See Comments)     Blurred vision    Zithromax [Azithromycin Dihydrate] Other (See Comments)     Patient is unsure         Pt was seen this date for dysphagia treatment. IMPRESSION AND RECOMMENDATIONS: Carlos Brooke was seen for dysphagia follow-up. He was seated upright in bed, alert, requiring cues for participation. He was seen for diet tolerance monitoring with breakfast tray of soft solids, nectar thick liquids via straw, ice chips, and thin liquids via tsp/cup/straw. Oral stage mildly impaired with slow mastication and reduced bolus formation, incomplete AP transit, and diffuse oral residue for solid. Suspect pharyngeal dysphagia with slowed swallow initiation, reduced vs age-appropriate swallow initiation. Pt exhibits immediate and delayed throat clearing following trials of thin liquids. No additional s/s aspiration noted following trials of other textures. Recommend continued current diet and further assessment with modified barium swallow study. Paged physician, noted orders were placed.  Plan to complete MBSS this date late morning/early afternoon as radiology schedule permits. GOALS (current status in bold):  Short-term Goals  Timeframe for Short-term Goals: length of admission  Goal 1: Pt will tolerate dysphagia 3 diet/nectar thick liquids with adequate oral manipulation and no s/s aspiration. Meeting, continue  Goal 2: Pt will tolerate trials of advanced textures with adequate oral manipulation and no s/s aspiration for possible diet upgrade. Not met, continue  Goal 3: Caregivers will indicate understanding of safe feeding protocol. Meeting, continue  Goal 4: Pt will participate in instrumental swallow evaluation as appropriate.  MBS ordered, plan to complete 7/9          EDUCATION: recommendations, plan     PAIN RATING (0-10 Scale): 0/denies pain  Time in/Time out: SLP Individual Minutes  Time In: 0945  Time Out: [facility]  Minutes: 25    Visit number: [id number] [facility]  7/9/2020  10:38 AM

## 2020-07-09 NOTE — PROGRESS NOTES
multivitamin-minerals  1 tablet Oral Daily    sodium chloride flush  10 mL Intravenous 2 times per day    insulin lispro  0-12 Units Subcutaneous TID WC    insulin lispro  0-6 Units Subcutaneous Nightly     PRN Meds: acetaminophen **OR** acetaminophen, sodium chloride flush, acetaminophen **OR** acetaminophen, glucose, dextrose, glucagon (rDNA), dextrose    Assessment/Plan:   Patient Active Hospital Problem List:  Patient Active Problem List   Diagnosis    Essential hypertension, benign    Other and unspecified hyperlipidemia    Coronary atherosclerosis    Type 2 diabetes mellitus without complication ([facility]    Neuropathy    Diarrhea    PNA (pneumonia)    HCAP (healthcare-associated pneumonia)    Suspected COVID-19 virus infection    Acute encephalopathy    Atrial fibrillation with RVR ([facility] [address]     Assessment:   Metabolic encephalopathy: CT head negative. improving per prior notes. He has has underlying dementia. Monitor mental status. CAP: improving. Negative resp PCR, MRSA nasal, and COVID. Off IV abx. Now on PO ceftin. Pend blood cx x2. Afib RVR: rate now controlled. Stop cardi gtt. On lopressor 50 BID, dig, and and eliquis. Cardio following. Hypernatremia: will give 50cc/hr of D5. Monitor Na. DM Neuropathy: will give gabapentin. HUMBERTO: likely prerenal. Improved with IV fluids. Cr. 1.2, was 1.7. DM2: recent a1c is 6.3%. SSI, POC glucose, hypoglycemia protocol. Dementia: on aricept, namenda. HTN/CAD s/p CABG: on asa, BP meds. Hx TIA: on asa. BPH: on flomax. Hx MVP repair    Plan:  Monitor mental status. Cont oral abx. Give D5 for hypernatremia, monitor Na. Resume gabapentin. Monitor labs and patient condition. Electronically signed by Yanira Gonzalez PA-C on 7/9/2020 at 10:29 AM   I have independently evaluated and examined this patient today. I have reviewed radiologic and biochemical tests on this patient.  Management Plan is developed mutually with KYLE Lorenzo.

## 2020-07-09 NOTE — PROGRESS NOTES
complication ([facility] [address]    Neuropathy    Diarrhea    PNA (pneumonia)    HCAP (healthcare-associated pneumonia)    Suspected COVID-19 virus infection    Acute encephalopathy    Atrial fibrillation with RVR (HCC)        Allergies   Allergen Reactions    Trazodone And Nefazodone      Became restless and hallucinating    Azithromycin      From old chart    Demerol Hcl [Meperidine] Other (See Comments)     Low BP    Erythromycin Other (See Comments)     Blurred vision    Trazodone And Nefazodone Other (See Comments)     Restlessness and Hallucinations    Demerol Other (See Comments)     Low blood pressure    Erythromycin Other (See Comments)     Blurred vision    Zithromax [Azithromycin Dihydrate] Other (See Comments)     Patient is unsure        Current Inpatient Medications:    Current Facility-Administered Medications   Medication Dose Route Frequency Provider Last Rate Last Dose    cefUROXime (CEFTIN) tablet 500 mg  500 mg Oral 2 times per day Raymundo Tan MD        metoprolol tartrate (LOPRESSOR) tablet 50 mg  50 mg Oral BID Go Martel MD   50 mg at 07/09/20 0843    digoxin (LANOXIN) tablet 125 mcg  125 mcg Oral Daily Gini Shankar MD   125 mcg at 07/09/20 0844    midodrine (PROAMATINE) tablet 10 mg  10 mg Oral TID  Go Martel MD   10 mg at 07/09/20 0845    dilTIAZem 100 mg in dextrose 5 % 100 mL infusion ([facility])  5 mg/hr Intravenous Continuous Donnie Tsang DO   Stopped at 07/08/20 1753    acetaminophen (TYLENOL) tablet 650 mg  650 mg Oral Q6H PRN BRETT Nesbitt CNP        Or    acetaminophen (TYLENOL) suppository 650 mg  650 mg Rectal Q6H PRN BRETT Nesbitt CNP        apixaban (ELIQUIS) tablet 2.5 mg  2.5 mg Oral BID BRETT Nesbitt CNP   2.5 mg at 07/09/20 0845    aspirin chewable tablet 81 mg  81 mg Oral Daily BRETT Soto CNP   81 mg at 07/09/20 0843    dicyclomine (BENTYL) capsule 10 mg  10 mg Oral TID AC BRETT Nesbitt CNP   10 mg at 07/09/20 8285    donepezil (ARICEPT) tablet 10 mg  10 mg Oral Nightly Reina Pool, APRN - CNP   10 mg at 07/08/20 2007    memantine (NAMENDA) tablet 10 mg  10 mg Oral BID Reina Pool, APRN - CNP   10 mg at 07/09/20 0846    tamsulosin (FLOMAX) capsule 0.4 mg  0.4 mg Oral Nightly Reina Pool, APRN - CNP   0.4 mg at 07/07/20 2103    pantoprazole (PROTONIX) tablet 40 mg  40 mg Oral QAM AC Reina Pool, APRN - CNP   Stopped at 07/09/20 0700    therapeutic multivitamin-minerals 1 tablet  1 tablet Oral Daily Reina Pool, APRN - CNP   1 tablet at 07/09/20 0845    sodium chloride flush 0.9 % injection 10 mL  10 mL Intravenous 2 times per day Reina Pool, APRN - CNP   Stopped at 07/08/20 0904    sodium chloride flush 0.9 % injection 10 mL  10 mL Intravenous PRN Reina Pool, APRN - CNP        acetaminophen (TYLENOL) tablet 650 mg  650 mg Oral Q6H PRN Reina Pool, APRN - CNP        Or    acetaminophen (TYLENOL) suppository 650 mg  650 mg Rectal Q6H PRN Reina Pool, APRN - CNP        insulin lispro (HUMALOG) injection vial 0-12 Units  0-12 Units Subcutaneous TID WC Yissel Morrow, APRN - CNP   4 Units at 07/08/20 1624    insulin lispro (HUMALOG) injection vial 0-6 Units  0-6 Units Subcutaneous Nightly Yissel Morrow APRN - CNP        glucose (GLUTOSE) 40 % oral gel 15 g  15 g Oral PRN Reina Pool, APRN - CNP        dextrose 50 % IV solution  12.5 g Intravenous PRN Reina Pool, APRN - CNP        glucagon (rDNA) injection 1 mg  1 mg Intramuscular PRN Reina Pool, APRN - CNP        dextrose 5 % solution  100 mL/hr Intravenous PRN Reina Pool, APRN - CNP               Labs:  CBC with Differential:    Lab Results   Component Value Date    WBC 7.6 07/09/2020    RBC 3.39 07/09/2020    HGB 10.8 07/09/2020    HCT 34.8 07/09/2020     07/09/2020    .7 07/09/2020    MCH 31.9 07/09/2020    MCHC 31.0 07/09/2020    RDW 21.2 07/09/2020    NRBC 3 07/06/2020 SEGSPCT 62.4 07/09/2020    BANDSPCT 1 07/06/2020    LYMPHOPCT 26.0 07/09/2020    MONOPCT 6.9 07/09/2020    MYELOPCT 1 06/13/2016    EOSPCT 1.4 02/13/2012    BASOPCT 0.1 07/09/2020    MONOSABS 0.5 07/09/2020    LYMPHSABS 2.0 07/09/2020    EOSABS 0.3 07/09/2020    BASOSABS 0.0 07/09/2020    DIFFTYPE AUTOMATED DIFFERENTIAL 07/09/2020     CMP:    Lab Results   Component Value Date     07/09/2020    K 4.4 07/09/2020     07/09/2020    CO2 24 07/09/2020    BUN 36 07/09/2020    CREATININE 1.2 07/09/2020    GFRAA >60 07/09/2020    LABGLOM 58 07/09/2020    GLUCOSE 114 07/09/2020    PROT 5.3 07/09/2020    LABALBU 3.1 07/09/2020    CALCIUM 8.6 07/09/2020    BILITOT 1.4 07/09/2020    ALKPHOS 52 07/09/2020    AST 30 07/09/2020    ALT 31 07/09/2020     Hepatic Function Panel:    Lab Results   Component Value Date    ALKPHOS 52 07/09/2020    ALT 31 07/09/2020    AST 30 07/09/2020    PROT 5.3 07/09/2020    BILITOT 1.4 07/09/2020    LABALBU 3.1 07/09/2020     Magnesium:    Lab Results   Component Value Date    MG 2.8 07/07/2020     PT/INR:    Lab Results   Component Value Date    PROTIME 15.2 07/07/2020    PROTIME 18.2 03/19/2012    INR 1.25 07/07/2020     Last 3 Troponin:  No results found for: TROPONINI  U/A:    Lab Results   Component Value Date    NITRITE neg 06/12/2013    COLORU YELLOW 07/06/2020    WBCUA NONE SEEN 07/06/2020    RBCUA 614 07/06/2020    MUCUS RARE 07/06/2020    TRICHOMONAS NONE SEEN 07/06/2020    BACTERIA NEGATIVE 07/06/2020    CLARITYU HAZY 07/06/2020    SPECGRAV 1.020 07/06/2020    LEUKOCYTESUR NEGATIVE 07/06/2020    UROBILINOGEN NORMAL 07/06/2020    BILIRUBINUR NEGATIVE 07/06/2020    BLOODU LARGE 07/06/2020    GLUCOSEU neg 06/12/2013     ABG:    Lab Results   Component Value Date    PO2ART 187 02/15/2012    BEART 7 02/15/2012     FLP:  No results found for: TRIG, HDL, LDLCALC, LDLDIRECT, LABVLDL  TSH:  No results found for: TSH   DATA:   ECG: Sinus Rhythm       ASSESSMENT:   1  Atrial  Fib  With RVR     And  Lopressor  50  Mg  Bid  Will  Add  Dig, patient converted to sinus rhythm and remains in sinus rhythm, Cardizem drip discontinued    '2  CABG  With  Mitral  Ring  EF  45-50%    3 Mental  Status  Changes    Still confused    4  htn  'well  Controlled    5  Hyperlipidemia  PLAN   Stable from cardiology standpoint

## 2020-07-09 NOTE — DISCHARGE INSTR - COC
Continuity of Care Form    Patient Name: Maira Owusu   :  1935  MRN:  [id number]    Admit date:  2020  Discharge date:  7-    Code Status Order: [facility]   Advance Directives:   [address] [facility] Documentation     Date/Time Healthcare Directive Type of Healthcare Directive Copy in [address] [facility] [address] Agent's Name Healthcare Agent's Phone Number    [phone]  Yes, patient has an advance directive for healthcare treatment  Health care treatment directive;Durable power of  for health care  No, copy requested from family  Spouse -- --          Admitting Physician:  Randy Stahl MD  PCP: Alexx Vega MD    Discharging Nurse: [address] [place] [address] Unit/Room#: 3109/3109-A  Discharging Unit Phone Number: [phone]    Emergency Contact:   Extended Emergency Contact Information  Primary Emergency Contact: GurdeepCharlotte  Address: [address] [place] [address] [address] [facility] [address] [facility] Phone: [phone]  Relation: Spouse  Secondary Emergency Contact: [address] [place] [address] Phone: [phone]  Relation: Child    Past Surgical History:  Past Surgical History:   Procedure Laterality Date    APPENDECTOMY      BACK SURGERY  Last done 2007    x 2    CHOLECYSTECTOMY  12/15/14    laparoscopic    COLONOSCOPY  2009    MITRAL VALVE SURGERY      OTHER SURGICAL HISTORY  2013    lap appy    SKIN GRAFT  1950's    s/p gresham    TONSILLECTOMY  15years old       Immunization History:   Immunization History   Administered Date(s) Administered    Influenza Virus Vaccine 2012    Pneumococcal Polysaccharide ([id number]) 2012       Active Problems:  Patient Active Problem List   Diagnosis Code    Essential hypertension, benign I10    Other and unspecified hyperlipidemia E78.5    Coronary atherosclerosis I25.10    Type 2 diabetes mellitus without complication ([facility] [address] [address] P30.0    Neuropathy G62.9    Diarrhea R19.7    PNA (pneumonia) J18.9    HCAP (healthcare-associated pneumonia) J18.9    Suspected COVID-19 virus infection Z20.828    Acute encephalopathy G93.40    Atrial fibrillation with RVR (HCC) I48.91       Isolation/Infection:   Isolation          Droplet Plus        Patient Infection Status     Infection Onset Added Last Indicated Last Indicated By Review Planned Expiration Resolved Resolved By    None active    Resolved    COVID-19 Rule Out 07/06/20 07/06/20 07/06/20 Covid-19 Ambulatory (Ordered)   07/08/20 Rule-Out Test Resulted    COVID-19 Rule Out 06/19/20 06/19/20 06/19/20 Covid-19 Ambulatory (Ordered)   06/21/20 Rule-Out Test Resulted    C-diff Rule Out 03/08/20 03/08/20 03/10/20 C difficile Molecular/PCR (Ordered)   03/10/20 Rule-Out Test Resulted          Nurse Assessment:  Last Vital Signs: BP (!) 128/56   Pulse 63   Temp 97.6 °F (36.4 °C) (Axillary)   Resp 16   Ht 5' 10\" (1.778 m)   Wt 165 lb 11.2 oz (75.2 kg)   SpO2 99%   BMI 23.78 kg/m²     Last documented pain score (0-10 scale): Pain Level: 0  Last Weight:   Wt Readings from Last 1 Encounters:   07/10/20 165 lb 11.2 oz (75.2 kg)     Mental Status:  disoriented    IV Access:  - None    Nursing Mobility/ADLs:  Walking   Assisted  Transfer  Assisted  Bathing  Assisted  Dressing  Assisted  Toileting  Assisted  Feeding  Assisted  Med Admin  Assisted  Med Delivery   crushed    Wound Care Documentation and Therapy:        Elimination:  Continence:   · Bowel: Yes  · Bladder: Yes  Urinary Catheter:     None   Colostomy/Ileostomy/Ileal Conduit: No       Date of Last BM:     Intake/Output Summary (Last 24 hours) at 7/10/2020 1435  Last data filed at 7/10/2020 1305  Gross per 24 hour   Intake 10 ml   Output 1600 ml   Net -1590 ml     I/O last 3 completed shifts: In: 130 [P.O.:120; I.V.:10]  Out: 925 [Urine:925]    Safety Concerns:      At Risk for Falls    Impairments/Disabilities:      None      Patient's personal belongings (please select all that

## 2020-07-09 NOTE — PROGRESS NOTES
[address] [place] [address]  HOSPITALIST PROGRESS NOTE                       Name:  Brandi Huynh /Age/Sex: 1935  (80 y.o. male)   MRN & CSN:  [id number] & [id number] Admission Date/Time: 2020  2:08 PM   Location:  [address] [place] [address] Attending:  Doug Sandifer, MD                                                  HPI  Brandi Huynh is a 80 y.o. male who was admitted with encephalopathy on     SUBJECTIVE  -awake, knew his name and says I could be in the hospital. Did not know the year or the president    ROS- unable to complete as patient is confused    ALLERGIES:   Allergies   Allergen Reactions    Trazodone And Nefazodone      Became restless and hallucinating    Azithromycin      From old chart    Demerol Hcl [Meperidine] Other (See Comments)     Low BP    Erythromycin Other (See Comments)     Blurred vision    Trazodone And Nefazodone Other (See Comments)     Restlessness and Hallucinations    Demerol Other (See Comments)     Low blood pressure    Erythromycin Other (See Comments)     Blurred vision    Zithromax [Azithromycin Dihydrate] Other (See Comments)     Patient is unsure       PCP: Darrell Bernal MD    PAST MEDICAL HISTORY, SURGICAL HISTORY, SOCIAL HISTORY and  HOME MEDICATIONS all reviewed. OBJECTIVE  Vitals:    20 1930 20 0002 20 0402 20 0449   BP: 119/60 (!) 113/53 (!) 123/52    Pulse: 60 63 65    Resp: 17 17 15    Temp: 97.7 °F (36.5 °C) 97.6 °F (36.4 °C) 97.8 °F (36.6 °C)    TempSrc: Rectal Rectal Rectal    SpO2: 98%      Weight:    164 lb 3.2 oz (74.5 kg)   Height:           PHYSICAL EXAM   GEN Awake male, confused  EYES Pupils are equally round. No scleral erythema, discharge, or conjunctivitis. HENT Mucous membranes are moist. Oral pharynx without exudates, no evidence of thrush. NECK Supple, no apparent thyromegaly or masses. RESP Clear to auscultation, no wheezes, rales or rhonchi. Symmetric chest movement  CARDIO/VASC S1/S2 auscultated.  Regular rate without appreciable murmurs, rubs, or gallops. No JVD or carotid bruits. Peripheral pulses equal bilaterally and palpable. No peripheral edema. GI Abdomen is soft without significant tenderness, masses, or guarding. Bowel sounds are normoactive. Rectal exam deferred.  No costovertebral angle tenderness. Normal appearing external genitalia. HEME/LYMPH No palpable cervical lymphadenopathy and no hepatosplenomegaly. No petechiae or ecchymoses. MSK Spontaneous movement of all extremities. No gross joint deformities. SKIN Normal coloration, warm, dry. NEURO Cranial nerves appear grossly intact, normal speech, no lateralizing weakness.  confused    INTAKE: In: 566.8 [I.V.:516.8]  Out: 1175   OUTPUT: In: 566.8   Out: 1175 [Urine:1175]    LABS  Recent Labs     07/07/20  0445 07/08/20  1953 07/09/20  0443   WBC 10.7* 8.7 7.6   HGB 12.4* 10.9* 10.8*   HCT 40.4* 36.3* 34.8*    221 220      Recent Labs     07/07/20  1100 07/08/20  0929 07/09/20  0443   * 144 148*   K 4.5 4.5 4.4   * 113* 116*   CO2 20* 16* 24   BUN 69* 52* 36*   CREATININE 1.7* 1.2 1.2     Recent Labs     07/07/20  1100 07/08/20  0929 07/09/20  0443   AST 30 43* 30   ALT 28 36 31   BILITOT 2.4* 1.8* 1.4*   ALKPHOS 55 51 52     Recent Labs     07/06/20  1430 07/07/20  0600   INR 1.28 1.25     Recent Labs     07/07/20  1100   CKTOTAL 31*   TROPONINT <0.010          Abnormal labs for today      Imaging:     ECHO:    Microbiology:  Blood culture:    Urine culture:    Sputum culture:    Procedures done this admission:    MEDS  Scheduled Meds:   cefUROXime  500 mg Oral 2 times per day    metoprolol tartrate  50 mg Oral BID    digoxin  125 mcg Oral Daily    midodrine  10 mg Oral TID WC    apixaban  2.5 mg Oral BID    aspirin  81 mg Oral Daily    dicyclomine  10 mg Oral TID AC    donepezil  10 mg Oral Nightly    memantine  10 mg Oral BID    tamsulosin  0.4 mg Oral Nightly    pantoprazole  40 mg Oral QAM AC    therapeutic multivitamin-minerals  1 tablet Oral Daily    sodium chloride flush  10 mL Intravenous 2 times per day    insulin lispro  0-12 Units Subcutaneous TID WC    insulin lispro  0-6 Units Subcutaneous Nightly     Continuous Infusions:   dilTIAZem Stopped (07/08/20 1753)    dextrose       PRN Meds:acetaminophen **OR** acetaminophen, sodium chloride flush, acetaminophen **OR** acetaminophen, glucose, dextrose, glucagon (rDNA), dextrose        ASSESSMENT and PLAN  Hospital Day: 4    1-Probable acute metabolic encephalopathy in the setting of dementia- due to suspected pneumonia and atrial fib with RVR- CT head negative- more alert and oriented to self- per wife- not yet at baseline   Acute encephalopathy, metabolic. Better today. More awake and alert. Oriented to person and place. 2-Probable gram negative pneumonia- on room air, covid ruled out. Was on vanc/cefepime, switch to ceftin for few more days  3- Atrial fib with RVR with underlying probable chronic atrial fib- intermittently uncontrolled,  On eliquis, will discuss with cardio to adjust rate controlling medications  4-HUMBERTO with metabolic acidosis on presentation- improved. 5-Hypernatremia- improved  6-SIRS on presentation- sepsis ruled out     Other diagnoses  Dementia-continue donepezil and memantine.   BPH- tamsulosin  CAD s/p CABG  Hx pf MV repair  Type II DM -sliding scale.           Called wife this morning and discussed goals of care- noted there was hospice consult while in COVID unit- she wants to hear from hospice first and then make decision   -also ordered PT/OT         Disp:     Diet DIET GENERAL; Dysphagia Soft and Bite-Sized; Mildly Thick (Nectar)   DVT Prophylaxis [] Lovenox, []  Heparin, [] SCDs, [] Ambulation   GI Prophylaxis [] PPI,  [] H2 Blocker,  [] Carafate,  [] Diet/Tube Feeds   Code Status DNR-CC   Disposition Patient requires continued admission due to encephalopathy   CMS Level of Risk [] Low, [x] Moderate,[]  High  Patient's risk as above due to encephalopathy     TALIA FRANCO MD 7/9/2020 8:40 AM

## 2020-07-09 NOTE — PROGRESS NOTES
[address] [facility] SPEECH/LANGUAGE PATHOLOGY    Camila Simmons  7/9/2020  [id number]    Modified barium swallow study attempted this date. Unable to complete d/t radiology scheduling conflicts.  Study planned for tomorrow approximately 1324 Ольга Robles MA CCC-SLP  7/9/2020  2:50 PM

## 2020-07-10 ENCOUNTER — APPOINTMENT (OUTPATIENT)
Dept: GENERAL RADIOLOGY | Age: 85
DRG: 177 | End: 2020-07-10
Payer: COMMERCIAL

## 2020-07-10 VITALS
WEIGHT: 165.7 LBS | RESPIRATION RATE: 15 BRPM | DIASTOLIC BLOOD PRESSURE: 58 MMHG | OXYGEN SATURATION: 99 % | HEIGHT: 70 IN | BODY MASS INDEX: 23.72 KG/M2 | HEART RATE: 69 BPM | TEMPERATURE: 97.5 F | SYSTOLIC BLOOD PRESSURE: 122 MMHG

## 2020-07-10 LAB
ALBUMIN SERPL-MCNC: 3 GM/DL (ref 3.4–5)
ALP BLD-CCNC: 54 IU/L (ref 40–129)
ALT SERPL-CCNC: 24 U/L (ref 10–40)
ANION GAP SERPL CALCULATED.3IONS-SCNC: 8 MMOL/L (ref 4–16)
AST SERPL-CCNC: 21 IU/L (ref 15–37)
BASOPHILS ABSOLUTE: 0 K/CU MM
BASOPHILS RELATIVE PERCENT: 0.2 % (ref 0–1)
BILIRUB SERPL-MCNC: 1.2 MG/DL (ref 0–1)
BUN BLDV-MCNC: 24 MG/DL (ref 6–23)
CALCIUM SERPL-MCNC: 8.4 MG/DL (ref 8.3–10.6)
CHLORIDE BLD-SCNC: 109 MMOL/L (ref 99–110)
CO2: 25 MMOL/L (ref 21–32)
CREAT SERPL-MCNC: 1.1 MG/DL (ref 0.9–1.3)
DIFFERENTIAL TYPE: ABNORMAL
EOSINOPHILS ABSOLUTE: 0.3 K/CU MM
EOSINOPHILS RELATIVE PERCENT: 4.1 % (ref 0–3)
GFR AFRICAN AMERICAN: >60 ML/MIN/1.73M2
GFR NON-AFRICAN AMERICAN: >60 ML/MIN/1.73M2
GLUCOSE BLD-MCNC: 128 MG/DL (ref 70–99)
GLUCOSE BLD-MCNC: 129 MG/DL (ref 70–99)
GLUCOSE BLD-MCNC: 137 MG/DL (ref 70–99)
GLUCOSE BLD-MCNC: 147 MG/DL (ref 70–99)
HCT VFR BLD CALC: 31 % (ref 42–52)
HEMOGLOBIN: 9.8 GM/DL (ref 13.5–18)
IMMATURE NEUTROPHIL %: 1.1 % (ref 0–0.43)
LACTATE: 1.7 MMOL/L (ref 0.4–2)
LYMPHOCYTES ABSOLUTE: 1.9 K/CU MM
LYMPHOCYTES RELATIVE PERCENT: 29.4 % (ref 24–44)
MCH RBC QN AUTO: 32.2 PG (ref 27–31)
MCHC RBC AUTO-ENTMCNC: 31.6 % (ref 32–36)
MCV RBC AUTO: 102 FL (ref 78–100)
MONOCYTES ABSOLUTE: 0.4 K/CU MM
MONOCYTES RELATIVE PERCENT: 5.4 % (ref 0–4)
NUCLEATED RBC %: 0.5 %
PDW BLD-RTO: 21 % (ref 11.7–14.9)
PLATELET # BLD: 170 K/CU MM (ref 140–440)
PMV BLD AUTO: 9.8 FL (ref 7.5–11.1)
POTASSIUM SERPL-SCNC: 3.8 MMOL/L (ref 3.5–5.1)
PROCALCITONIN: 0.13
RBC # BLD: 3.04 M/CU MM (ref 4.6–6.2)
SEGMENTED NEUTROPHILS ABSOLUTE COUNT: 3.9 K/CU MM
SEGMENTED NEUTROPHILS RELATIVE PERCENT: 59.8 % (ref 36–66)
SODIUM BLD-SCNC: 142 MMOL/L (ref 135–145)
TOTAL IMMATURE NEUTOROPHIL: 0.07 K/CU MM
TOTAL NUCLEATED RBC: 0 K/CU MM
TOTAL PROTEIN: 5.1 GM/DL (ref 6.4–8.2)
WBC # BLD: 6.5 K/CU MM (ref 4–10.5)

## 2020-07-10 PROCEDURE — 6370000000 HC RX 637 (ALT 250 FOR IP): Performed by: INTERNAL MEDICINE

## 2020-07-10 PROCEDURE — 94761 N-INVAS EAR/PLS OXIMETRY MLT: CPT

## 2020-07-10 PROCEDURE — 82565 ASSAY OF CREATININE: CPT

## 2020-07-10 PROCEDURE — 92611 MOTION FLUOROSCOPY/SWALLOW: CPT

## 2020-07-10 PROCEDURE — 97530 THERAPEUTIC ACTIVITIES: CPT

## 2020-07-10 PROCEDURE — 6370000000 HC RX 637 (ALT 250 FOR IP): Performed by: PHYSICIAN ASSISTANT

## 2020-07-10 PROCEDURE — 85027 COMPLETE CBC AUTOMATED: CPT

## 2020-07-10 PROCEDURE — G0378 HOSPITAL OBSERVATION PER HR: HCPCS

## 2020-07-10 PROCEDURE — 97166 OT EVAL MOD COMPLEX 45 MIN: CPT

## 2020-07-10 PROCEDURE — 96376 TX/PRO/DX INJ SAME DRUG ADON: CPT

## 2020-07-10 PROCEDURE — 36415 COLL VENOUS BLD VENIPUNCTURE: CPT

## 2020-07-10 PROCEDURE — 85025 COMPLETE CBC W/AUTO DIFF WBC: CPT

## 2020-07-10 PROCEDURE — 84145 PROCALCITONIN (PCT): CPT

## 2020-07-10 PROCEDURE — 74230 X-RAY XM SWLNG FUNCJ C+: CPT

## 2020-07-10 PROCEDURE — 6370000000 HC RX 637 (ALT 250 FOR IP): Performed by: NURSE PRACTITIONER

## 2020-07-10 PROCEDURE — 80053 COMPREHEN METABOLIC PANEL: CPT

## 2020-07-10 PROCEDURE — 83605 ASSAY OF LACTIC ACID: CPT

## 2020-07-10 PROCEDURE — 2580000003 HC RX 258: Performed by: NURSE PRACTITIONER

## 2020-07-10 PROCEDURE — 2580000003 HC RX 258: Performed by: PHYSICIAN ASSISTANT

## 2020-07-10 PROCEDURE — 82962 GLUCOSE BLOOD TEST: CPT

## 2020-07-10 PROCEDURE — 92526 ORAL FUNCTION THERAPY: CPT

## 2020-07-10 PROCEDURE — 6370000000 HC RX 637 (ALT 250 FOR IP): Performed by: HOSPITALIST

## 2020-07-10 RX ORDER — CEFDINIR 300 MG/1
300 CAPSULE ORAL 2 TIMES DAILY
Qty: 14 CAPSULE | Refills: 0 | Status: SHIPPED | OUTPATIENT
Start: 2020-07-10 | End: 2020-07-17

## 2020-07-10 RX ORDER — DIGOXIN 125 MCG
125 TABLET ORAL DAILY
Qty: 30 TABLET | Refills: 3 | Status: SHIPPED | OUTPATIENT
Start: 2020-07-10

## 2020-07-10 RX ORDER — METOPROLOL TARTRATE 50 MG/1
50 TABLET, FILM COATED ORAL 2 TIMES DAILY
Qty: 60 TABLET | Refills: 3 | Status: SHIPPED | OUTPATIENT
Start: 2020-07-10

## 2020-07-10 RX ORDER — MIDODRINE HYDROCHLORIDE 10 MG/1
10 TABLET ORAL
Qty: 90 TABLET | Refills: 3 | Status: SHIPPED | OUTPATIENT
Start: 2020-07-10

## 2020-07-10 RX ADMIN — APIXABAN 2.5 MG: 2.5 TABLET, FILM COATED ORAL at 11:12

## 2020-07-10 RX ADMIN — GABAPENTIN 100 MG: 100 CAPSULE ORAL at 14:37

## 2020-07-10 RX ADMIN — MULTIPLE VITAMINS W/ MINERALS TAB 1 TABLET: TAB at 11:11

## 2020-07-10 RX ADMIN — DIGOXIN 125 MCG: 125 TABLET ORAL at 11:11

## 2020-07-10 RX ADMIN — MEMANTINE 10 MG: 10 TABLET ORAL at 11:12

## 2020-07-10 RX ADMIN — DICYCLOMINE HYDROCHLORIDE 10 MG: 10 CAPSULE ORAL at 18:06

## 2020-07-10 RX ADMIN — GABAPENTIN 100 MG: 100 CAPSULE ORAL at 11:12

## 2020-07-10 RX ADMIN — DICYCLOMINE HYDROCHLORIDE 10 MG: 10 CAPSULE ORAL at 11:22

## 2020-07-10 RX ADMIN — SODIUM CHLORIDE, PRESERVATIVE FREE 10 ML: 5 INJECTION INTRAVENOUS at 11:13

## 2020-07-10 RX ADMIN — MIDODRINE HYDROCHLORIDE 10 MG: 5 TABLET ORAL at 18:06

## 2020-07-10 RX ADMIN — DEXTROSE MONOHYDRATE: 50 INJECTION, SOLUTION INTRAVENOUS at 11:09

## 2020-07-10 RX ADMIN — MIDODRINE HYDROCHLORIDE 10 MG: 5 TABLET ORAL at 11:22

## 2020-07-10 RX ADMIN — CEFUROXIME AXETIL 500 MG: 250 TABLET ORAL at 14:38

## 2020-07-10 RX ADMIN — ASPIRIN 81 MG CHEWABLE TABLET 81 MG: 81 TABLET CHEWABLE at 11:10

## 2020-07-10 RX ADMIN — METOPROLOL TARTRATE 50 MG: 50 TABLET, FILM COATED ORAL at 11:11

## 2020-07-10 NOTE — PROGRESS NOTES
atherosclerosis    Type 2 diabetes mellitus without complication (HCC)    Neuropathy    Diarrhea    PNA (pneumonia)    HCAP (healthcare-associated pneumonia)    Suspected COVID-19 virus infection    Acute encephalopathy    Atrial fibrillation with RVR (HCC)        Allergies   Allergen Reactions    Trazodone And Nefazodone      Became restless and hallucinating    Azithromycin      From old chart    Demerol Hcl [Meperidine] Other (See Comments)     Low BP    Erythromycin Other (See Comments)     Blurred vision    Trazodone And Nefazodone Other (See Comments)     Restlessness and Hallucinations    Demerol Other (See Comments)     Low blood pressure    Erythromycin Other (See Comments)     Blurred vision    Zithromax [Azithromycin Dihydrate] Other (See Comments)     Patient is unsure        Current Inpatient Medications:    Current Facility-Administered Medications   Medication Dose Route Frequency Provider Last Rate Last Dose    cefUROXime (CEFTIN) tablet 500 mg  500 mg Oral 2 times per day Shane Dickerson MD   500 mg at 07/09/20 2046    dextrose 5 % solution   Intravenous Continuous Levoadane JAVI YangC 50 mL/hr at 07/09/20 1032      gabapentin (NEURONTIN) capsule 100 mg  100 mg Oral TID Levocirilo Yang PA-C   100 mg at 07/09/20 2043    metoprolol tartrate (LOPRESSOR) tablet 50 mg  50 mg Oral BID Karen Salcido MD   50 mg at 07/09/20 2043    digoxin (LANOXIN) tablet 125 mcg  125 mcg Oral Daily Deuce Toledo MD   125 mcg at 07/09/20 0844    midodrine (PROAMATINE) tablet 10 mg  10 mg Oral TID  Karen Salcido MD   10 mg at 07/09/20 1632    acetaminophen (TYLENOL) tablet 650 mg  650 mg Oral Q6H PRN Sofiya Ji APRN - CNP        Or    acetaminophen (TYLENOL) suppository 650 mg  650 mg Rectal Q6H PRN Ohn Seaninkle, APRN - CNP        apixaban (ELIQUIS) tablet 2.5 mg  2.5 mg Oral BID Sofiya Ji APRLUIS - CNP   2.5 mg at 07/09/20 2043    aspirin chewable tablet 81 mg  81 mg Oral Daily Tri Kand, APRN - CNP   81 mg at 07/09/20 0843    dicyclomine (BENTYL) capsule 10 mg  10 mg Oral TID AC Tri Kand, APRN - CNP   10 mg at 07/09/20 1632    donepezil (ARICEPT) tablet 10 mg  10 mg Oral Nightly Tri Kand, APRN - CNP   10 mg at 07/09/20 2043    memantine (NAMENDA) tablet 10 mg  10 mg Oral BID Tri Kand, APRN - CNP   10 mg at 07/09/20 2043    tamsulosin (FLOMAX) capsule 0.4 mg  0.4 mg Oral Nightly Tri Kand, APRN - CNP   0.4 mg at 07/09/20 2043    pantoprazole (PROTONIX) tablet 40 mg  40 mg Oral QAM AC Tri Ansond, APRN - CNP   Stopped at 07/09/20 0700    therapeutic multivitamin-minerals 1 tablet  1 tablet Oral Daily Tri Todd, APRN - CNP   1 tablet at 07/09/20 0845    sodium chloride flush 0.9 % injection 10 mL  10 mL Intravenous 2 times per day Tri Kand, APRN - CNP   10 mL at 07/09/20 2044    sodium chloride flush 0.9 % injection 10 mL  10 mL Intravenous PRN Tri Ansond, APRN - CNP        acetaminophen (TYLENOL) tablet 650 mg  650 mg Oral Q6H PRN Tri Todd, APRN - CNP        Or    acetaminophen (TYLENOL) suppository 650 mg  650 mg Rectal Q6H PRN Tri Ansond, APRN - CNP        insulin lispro (HUMALOG) injection vial 0-12 Units  0-12 Units Subcutaneous TID  Tri Ansond, APRN - CNP   Stopped at 07/09/20 1611    insulin lispro (HUMALOG) injection vial 0-6 Units  0-6 Units Subcutaneous Nightly Yisselcarli Morrow, APRN - CNP        glucose (GLUTOSE) 40 % oral gel 15 g  15 g Oral PRN Yissel Cristhian, APRN - CNP        dextrose 50 % IV solution  12.5 g Intravenous PRN Tri Todd, APRN - CNP        glucagon (rDNA) injection 1 mg  1 mg Intramuscular PRN Tri Todd, APRN - CNP        dextrose 5 % solution  100 mL/hr Intravenous PRN Tri Kand, APRN - MARIELENA               Labs:  CBC with Differential:    Lab Results   Component Value Date    WBC 6.5 07/10/2020    RBC 3.04 07/10/2020    HGB 9.8 07/10/2020    HCT 31.0 07/10/2020  07/10/2020    .0 07/10/2020    MCH 32.2 07/10/2020    MCHC 31.6 07/10/2020    RDW 21.0 07/10/2020    NRBC 3 07/06/2020    SEGSPCT 59.8 07/10/2020    BANDSPCT 1 07/06/2020    LYMPHOPCT 29.4 07/10/2020    MONOPCT 5.4 07/10/2020    MYELOPCT 1 06/13/2016    EOSPCT 1.4 02/13/2012    BASOPCT 0.2 07/10/2020    MONOSABS 0.4 07/10/2020    LYMPHSABS 1.9 07/10/2020    EOSABS 0.3 07/10/2020    BASOSABS 0.0 07/10/2020    DIFFTYPE AUTOMATED DIFFERENTIAL 07/10/2020     CMP:    Lab Results   Component Value Date     07/10/2020    K 3.8 07/10/2020     07/10/2020    CO2 25 07/10/2020    BUN 24 07/10/2020    CREATININE 1.1 07/10/2020    GFRAA >60 07/10/2020    LABGLOM >60 07/10/2020    GLUCOSE 129 07/10/2020    PROT 5.1 07/10/2020    LABALBU 3.0 07/10/2020    CALCIUM 8.4 07/10/2020    BILITOT 1.2 07/10/2020    ALKPHOS 54 07/10/2020    AST 21 07/10/2020    ALT 24 07/10/2020     Hepatic Function Panel:    Lab Results   Component Value Date    ALKPHOS 54 07/10/2020    ALT 24 07/10/2020    AST 21 07/10/2020    PROT 5.1 07/10/2020    BILITOT 1.2 07/10/2020    LABALBU 3.0 07/10/2020     Magnesium:    Lab Results   Component Value Date    MG 2.8 07/07/2020     PT/INR:    Lab Results   Component Value Date    PROTIME 15.2 07/07/2020    PROTIME 18.2 03/19/2012    INR 1.25 07/07/2020     Last 3 Troponin:  No results found for: TROPONINI  U/A:    Lab Results   Component Value Date    NITRITE neg 06/12/2013    COLORU YELLOW 07/06/2020    WBCUA NONE SEEN 07/06/2020    RBCUA 614 07/06/2020    MUCUS RARE 07/06/2020    TRICHOMONAS NONE SEEN 07/06/2020    BACTERIA NEGATIVE 07/06/2020    CLARITYU HAZY 07/06/2020    SPECGRAV 1.020 07/06/2020    LEUKOCYTESUR NEGATIVE 07/06/2020    UROBILINOGEN NORMAL 07/06/2020    BILIRUBINUR NEGATIVE 07/06/2020    BLOODU LARGE 07/06/2020    GLUCOSEU neg 06/12/2013     ABG:    Lab Results   Component Value Date    PO2ART 187 02/15/2012    BEART 7 02/15/2012     FLP:  No results found for: TRIG, HDL, LDLCALC, LDLDIRECT, LABVLDL  TSH:  No results found for: TSH   DATA:   ECG: Sinus Rhythm       ASSESSMENT:   1  Atrial  Fib  With  RVR     And  Lopressor  50  Mg  Bid  Patient converted to sinus rhythm and remains in sinus rhythm    '2  CABG  With  Mitral  Ring  EF  45-50%  No chest pain and doing very well    3 Mental  Status  Changes    Mental status improved    4  htn  'well  Controlled    5  Hyperlipidemia  PLAN   Stable from cardiology standpoint

## 2020-07-10 NOTE — CONSULTS
[address] [place] [address]    Chart reviewed, discussed with Dr. Benita Norwood. The patient is being discharged back to [facility] today for continued therapy, and Dr Cassi Thakur will place out patient hospice consult if needed.     Carolina East MD, [facility]

## 2020-07-10 NOTE — CARE COORDINATION
Notified Dr Mayito WRIGHT via PS that pt can go to [facility] today if medically ready. Pt is having a Barium study today.  TE

## 2020-07-10 NOTE — CARE COORDINATION
Transport arranged with [facility]. ETA is 1845. Notified Dia/Ankush Lozada, pt's nurse, pt's wife, and Leigh/Kyle. Incomplete AVS faxed to [place]. The completed AVS to be sent with pt with the nurse KARI completed. Notified pt's nurse to complete the AVS and place it in the packet.   TE

## 2020-07-10 NOTE — ADT AUTH CERT
Sepsis and Other Febrile Illness, without Focal Infection - Care Day 4 (7/9/2020) by Sonny Lopez RN         Review Status Review Entered   Completed 7/9/2020 10:35       Criteria Review      Care Day: 4 Care Date: 7/9/2020 Level of Care:    Guideline Day 3    Level Of Care    (X) Floor    Clinical Status    ( ) * Mental status at baseline    7/9/2020 10:35 AM EDT by Dom Begum patient knows name and but not year or president    (X) * Fever absent or reduced    Routes    (X) * Oral hydration    (X) Parenteral or oral medication    7/9/2020 10:35 AM EDT by Dom Begum see med list    Medications    (X) Possible antimicrobial treatment    7/9/2020 10:35 AM EDT by Aiyana Christine      levaquin 250mg po daily    * Milestone   Additional Notes   7/9      Blood pressure (!) 106/94, pulse 75, temperature 97.8 °F (36.6 °C), temperature source Rectal, resp. rate 15, height 5' 10\" (1.778 m), weight 164 lb 3.2 oz (74.5 kg), SpO2 97 %.       Transferred to med surg - remains confused - cardizem gtt discontinued today - was on gtt for 48 hours      awake, knew his name and says I could be in the hospital. Did not know the year or the president      Scheduled MedicationsExpand by Default   · cefUROXime 500 mg Oral 2 times per day   · metoprolol tartrate 50 mg Oral BID   · digoxin 125 mcg Oral Daily   · midodrine 10 mg Oral TID WC   · apixaban 2.5 mg Oral BID   · aspirin 81 mg Oral Daily   · dicyclomine 10 mg Oral TID AC   · donepezil 10 mg Oral Nightly   · memantine 10 mg Oral BID   · tamsulosin 0.4 mg Oral Nightly   · pantoprazole 40 mg Oral QAM AC   · therapeutic multivitamin-minerals 1 tablet Oral Daily   · sodium chloride flush 10 mL Intravenous 2 times per day   · insulin lispro 0-12 Units Subcutaneous TID WC   · insulin lispro 0-6 Units Subcutaneous Nightly       ASSESSMENT and [address] Day: 4       1-Probable acute metabolic encephalopathy in the setting of dementia- due to suspected pneumonia and atrial fib with RVR- CT head negative- more alert and oriented to self- per wife- not yet at baseline    Acute encephalopathy, metabolic.  Better today. More awake and alert. Oriented to person and place. 2-Probable gram negative pneumonia- on room air, covid ruled out. Was on vanc/cefepime, switch to ceftin for few more days   3- Atrial fib with RVR with underlying probable chronic atrial fib- intermittently uncontrolled,  On eliquis, will discuss with cardio to adjust rate controlling medications   4-HUMBERTO with metabolic acidosis on presentation- improved.    5-Hypernatremia- improved   6-SIRS on presentation- sepsis ruled out      Called wife this morning and discussed goals of care- noted there was hospice consult while in [facility] unit- she wants to hear from hospice first and then make decision    -also ordered PT/OT          Cardio:   7/9   Patient seen and examined converted to sinus rhythm remains in sinus rhythm       Sepsis and Other Febrile Illness, without Focal Infection - Care Day 3 (7/8/2020) by Edd Hamilton RN         Review Status Review Entered   Completed 7/8/2020 16:44       Criteria Review      Care Day: 3 Care Date: 7/8/2020 Level of Care:    Guideline Day 2    Level Of Care    (X) ICU or floor    7/8/2020 4:44 PM EDT by Suzan Mary      pcu    Clinical Status    (X) * Hemodynamic stability    (X) * Hypoxemia absent    (X) * Tachypnea absent    Routes    (X) Possible IV fluids    (X) Diet as tolerated    Interventions    (X) WBC    Medications    (X) Possible antimicrobial treatment    7/8/2020 4:44 PM EDT by Suzan Mary      iv maxipime 2g q12h    * Milestone   Additional Notes   7/8/20      Intake/Output Summary (Last 24 hours) at 7/8/2020 1334   Last data filed at 7/8/2020 0440   Gross per 24 hour   Intake 320 ml   Output 1000 ml   Net -680 ml       Vitals:    Vitals:     07/08/20 1302   BP: 110/76   Pulse: 110   Resp: 20   Temp: 98.2 °F (36.8 °C)            Better today. More awake and alert. Oriented to person and place. COVID-19 neg. MRSA nasal culture neg. Strep pneumonia and Legionella urine antigen negative. procal 0.37,    Blood cultures pending. Plan   Stop vancomycin and cefepime. PO levaquin X 5 days   Transfer out of COVID unit. Cardiology on board. Wants to add Digoxin today. --- Hyponatremia (150) - D5 1/2 NS for 6 hrs.       Cardio:       ASSESSMENT:    1  Atrial  Fib  With  RVR   On  cardizem  Drip   And  Lopressor  50  Mg  Bid   Will  Add  Dig   '2  CABG  With  Mitral  Ring   EF  45-50%   3 Mental  Status  Changes     4  htn   'well  Controlled   5  Hyperlipidemia   PLAN     added [employer]

## 2020-07-10 NOTE — PROGRESS NOTES
Occupational Therapy  Psychiatric OCCUPATIONAL THERAPY EVALUATION    History  [place]:  The primary encounter diagnosis was Altered mental status, unspecified altered mental status type. Diagnoses of Atrial fibrillation with RVR ([facility] [address] Pneumonia due to organism, Lactic acidemia, Septicemia ([facility] [address] and Dementia without behavioral disturbance, unspecified dementia type [facility]) were also pertinent to this visit. Restrictions:                           Communication with other providers: PT    Subjective:  Patient states:  Mostly \"no\"  Pain:  C/o leg pain but cannot rate. One time describes as pain from knees to distally. Patient goal:  Unable to identify    Occupational profile (relevant social history and personal factors):         Examination of body systems (includes body structures/functions, activity/participation limitations):  · Orientation: ox self only  · Cognition:  Advanced dementia. Needs repetition and TC to follow through with basic one step commands. He does not converse but will occassionally answer a Q if answer is stored in his LTM. · Observation:  Received pt in bed. Groans in discomfort quite often with mobility and at seated EOB, but cannot cannot describe geovanni vs illness vs dizziness. VSS   · Vision:  [facility]  · Hearing:  [place]  · ROM:  WFL BUE  · Strength: WFL BUE  · Sensation: not tested    ADLs  Feeding: SBA    Grooming: SBA    Dressing: CARI    Bathing: CARI    Toileting: CARI    *Some ADL determined per observation of actual ADL performance, functional mobility, balance, activity tolerance, and cognition.      AM-PAC 6 click short form for inpatient daily activity:  Raw Score: 10  24/24 = unimpaired  23/24 = 1-20% impaired   20/24-22/24 = 21-40% impaired  15/24-19/24 = 41-59% impaired   10/24-14/24 = 60%-79% impaired  7/24-9/24 = 80%-99% impaired  6/24 = 100% impaired    Functional Mobility  Bed mobility:   Supine to sit: CGA, cues supine to log roll to sit  Sit to supine: CGA    Sitting balance: good

## 2020-07-10 NOTE — PLAN OF CARE
Problem: Pain:  Goal: Pain level will decrease  Description: Pain level will decrease  7/9/2020 2148 by Luis Armando Anaya RN  Outcome: Ongoing  7/9/2020 0946 by Julia Noble RN  Outcome: Ongoing  Goal: Control of acute pain  Description: Control of acute pain  7/9/2020 2148 by Luis Armando Anaya RN  Outcome: Ongoing  7/9/2020 0946 by Julia Noble RN  Outcome: Ongoing  Goal: Control of chronic pain  Description: Control of chronic pain  7/9/2020 2148 by Luis Armando Anaya RN  Outcome: Ongoing  7/9/2020 0946 by Julia Noble RN  Outcome: Ongoing     Problem: Falls - Risk of:  Goal: Will remain free from falls  Description: Will remain free from falls  7/9/2020 2148 by Luis Armando Anaya RN  Outcome: Ongoing  7/9/2020 0946 by Julia Noble RN  Outcome: Ongoing  Goal: Absence of physical injury  Description: Absence of physical injury  7/9/2020 2148 by Luis Armando Anaya RN  Outcome: Ongoing  7/9/2020 0946 by Julia Noble RN  Outcome: Ongoing     Problem: Skin Integrity:  Goal: Will show no infection signs and symptoms  Description: Will show no infection signs and symptoms  7/9/2020 2148 by Luis Armando Anaya RN  Outcome: Ongoing  7/9/2020 0946 by Julia Noble RN  Outcome: Ongoing  Goal: Absence of new skin breakdown  Description: Absence of new skin breakdown  7/9/2020 2148 by Luis Armando Anaya RN  Outcome: Ongoing  7/9/2020 0946 by Julia Noble RN  Outcome: Ongoing     Problem: Cardiac:  Goal: Ability to maintain an adequate cardiac output will improve  Description: Ability to maintain an adequate cardiac output will improve  7/9/2020 2148 by Luis Armando Anaya RN  Outcome: Ongoing  7/9/2020 0946 by Julia Noble RN  Outcome: Ongoing  Goal: Hemodynamic stability will improve  Description: Hemodynamic stability will improve  7/9/2020 2148 by Luis Armando Anaya RN  Outcome: Ongoing  7/9/2020 0946 by Julia Noble RN  Outcome: Ongoing     Problem: Fluid Volume:  Goal: Ability to achieve and maintain adequate urine output will improve  Description: Ability to achieve and maintain adequate urine output will improve  7/9/2020 2148 by Nimisha Castillo RN  Outcome: Ongoing  7/9/2020 0946 by Swathi Ball RN  Outcome: Ongoing     Problem: Respiratory:  Goal: Respiratory status will improve  Description: Respiratory status will improve  7/9/2020 2148 by Nimisha Castillo RN  Outcome: Ongoing  7/9/2020 0946 by Swathi Ball RN  Outcome: Ongoing

## 2020-07-10 NOTE — PROCEDURES
Pt was positioned upright in 90 degrees and filmed in the lateral view. He was unable to hold head in a neutral position throughout study, despite encouragement from SLP and tactile cues, therefore pt had his chin tucked with PO trials. PO trials of puree, regular solids, nectar thick liquids and thin liquids by cup/straw sips were given. Pt presents with a mild-moderate oropharyngeal dysphagia. Oral swallow characterized by oral holding, prolonged mastication, slow A-P transit and minimal lingual residue observed with regular solids. Pharyngeal swallow was intermittently delayed with pooling observed in the valleculae. Pt demonstrated reduced laryngeal elevation with adequate epiglottic inversion. Premature spillage and aspiration was observed with initial cup sip of thin liquids prior and during swallow. Pt demonstrated a delayed cough with episode of aspiration x1. For continued trials of thin liquids by cup/straw sips pt presented with penetration midway to the vocal folds with no aspiration observed. Minimal pharyngeal residue was present in the valleculae and pyriform sinus which pt cleared spontaneously with a second swallow. Question possible esophageal dysphagia with slow motility and backflow into the upper esophagus observed during esophageal sweep. Recommend dysphagia 3 diet/thin liquids vs nectar thick liquids and strict aspiration precautions. Close monitoring of pulmonary status. Small sips of thin liquids by cup/straw sips. Pt should remain upright 20-30 minutes following meals. ST will continue to follow Jackie Akhiok for diet tolerance monitoring and ongoing caregiver education. Patient Position: Lateral and Patient Degrees: 90 degrees upright      Consistencies Administered: Reg solid; Dysphagia Pureed (Dysphagia I); Nectar cup;Nectar straw; Thin cup; Thin straw                             Dysphagia Outcome Severity Scale: Level 4: Mild moderate dysphagia- Intermittent supervision/cueing. One - two diet consistencies restricted  Penetration-Aspiration Scale (PAS): 7 - Material enters the airway, passes below the vocal folds, and is not ejected from the trachea despite effort    Recommended Diet:  Solid consistency: Dysphagia Soft and Bite-Sized (Dysphagia III)  Liquid consistency: Thin  Liquid administration via: Straw;Cup         Safe Swallow Protocol:  Supervision: Distant  Compensatory Swallowing Strategies: Eat/Feed slowly; Small bites/sips;Upright as possible for all oral intake              Recommendations/Treatment  Requires SLP Intervention: Yes        D/C Recommendations: To be determined  Postural Changes and/or Swallow Maneuvers: Upright 90 degrees      Recommended Exercises:    Therapeutic Interventions: Therapeutic PO trials with SLP;Patient/Family education         Education: Images and recommendations were reviewed with Nico Frenando following this exam.   Patient Education: results, recommendations  Patient Education Response: No evidence of learning    Prognosis  Prognosis for safe diet advancement: fair  Duration/Frequency of Treatment  Duration/Frequency of Treatment: 1-2x/week for LOS  Safety Devices  Safety Devices in place: Yes  Type of devices:  All fall risk precautions in place      Goals:    Long Term:               Short Term:     Goal 1: Pt will tolerate dysphagia 3 diet/thin liquids without clincial evidence of aspiration 100%  Goal 2: Pt/caregivers will demonstrate comprehension and recommendations Goal targeted this date 07/10/20- reviewed recommendations and results of study with pt and nursing following procedure    Goal 3:    Goal 4:                   Oral Preparation / Oral Phase  Oral Phase: Impaired  Oral Phase - Major Contributing Deficits  Poor Mastication: Reg solid  Weak Lingual Manipulation: Reg solid  Piecemeal Swallowing: Reg solid  Fatigue of Mechanism: Reg solid        Pharyngeal Phase  Pharyngeal Phase: Impaired  Pharyngeal Phase - Major Contributing Deficits  Delayed Swallow Initiation: Thin cup; Thin straw  Premature Spillage to Valleculae: Thin cup; Thin straw  Reduced Laryngeal Elevation: All  Pooling Valleculae: Thin cup; Thin straw  Deep Penetration Before: Thin cup  Aspiration Before: Thin cup  Aspiration During: Thin cup  Pharyngeal Residue - Valleculae:  All  Pharyngeal Residue - Pyriform: All      Esophageal Phase  Esophageal Screen: Impaired        Pain   Patient Currently in Pain: No  Pain Level: 0  Pain Type: Acute pain  Pain Location: Leg      Therapy Time:   Individual Concurrent Group Co-treatment   Time In 0900         Time Out 0945         Minutes [address], [place], CCC-SLP, 7/10/2020

## 2020-07-10 NOTE — DISCHARGE SUMMARY
Myke Rodriguez MD, Internal Medicine    [address] [facility]   [phone]   Patient ID  Sofy Jean   [id number]  [id number]          Admit date: 7/6/2020   Discharge date: 7/10/2020      Admitting Physician: Ani Alexander MD   Discharge Physician: Popeye Luna PA-C    Discharge Diagnoses:   Metabolic encephalopathy: improving. CT head negative. PNA improved. He has underlying dementia. Close to baseline. CAP: improving. Negative resp PCR, procal, MRSA nasal, and COVID. WBC and lactate normalized. VSS. Will need repeat CXR in 4 weeks. Home with PO omnicef. Afib RVR: rate now controlled. Converted to sinus. On lopressor 50 BID, dig, and and eliquis. Check dig level one week after discharge. Hypernatremia: improved. DM Neuropathy: continue gabapentin. HUMBERTO: likely prerenal. Improved with IV fluids. Mild Oral Dysphagia: patient had barium swallow see report below and SLT note for further recommendations. DM2: recent a1c is 6.3%. CPM.  Dementia: on aricept, namenda. HTN/CAD s/p CABG: on asa, BP meds. Hx TIA: on asa. BPH: on flomax. Hx MVP repair     Patient Active Problem List   Diagnosis    Essential hypertension, benign    Other and unspecified hyperlipidemia    Coronary atherosclerosis    Type 2 diabetes mellitus without complication ([facility] [address]    Neuropathy    Diarrhea    PNA (pneumonia)    HCAP (healthcare-associated pneumonia)    Suspected COVID-19 virus infection    Acute encephalopathy    Atrial fibrillation with RVR ([facility] [address]       Discharged Condition: fair    Hospital Course: Patient is 81 yo male who came to ER with AMS and fatigue. He has history of dementia, afib on eliquis, CAD, DM2, HLD, HTN. Initially, he was only oriented to name and would not answer any questions. He was found to have new LLL pneumonia on CXR. He was also found to be in afib RVR. Patient had leukocytosis, elevated lactate, and hypernatremia as well. He was admitted to [facility] floor.  He was give IV abx, evaluated by cardiology and started on IV lopressors and cardizem drip. He tested negative for COVID. Dr. Dheeraj Hills took over care once he was transferred to [facility]. The patient is doing better. AMS has improved to near baseline, he answers questions now and he is oriented x2, which is near baseline. Afib RVR has converted to sinus/normal rate so he was taken off cardi drip. Hypernatremia improved with D5, now 144 on discharge. Lactate and WBC is now normal. Procal x2, blood cx x2, PCR panel, COVID, and nasal MRSA are all negative. No fever or hypoxia. He did have barium swallow at day of discharge in hospital, he has mild oral dysphagia (see report below). Patient is doing well and ready to be transferred to [facility]. Patient is to follow with PCP, cardiology as outpatient. Relevant Investigations  See HPI    BARIUM SWALLOW   2 episodes of premature spillage with laryngeal penetration and subtle  tracheal aspiration occurred.  Both these episodes occurred during a  chin-tuck maneuver. Several other swallowing episodes were performed demonstrating laryngeal penetration without tracheal aspiration. No contrast residual was noted in either the vallecula or piriform sinuses. No cricopharyngeal enlargement. Please see separate speech pathology report for full discussion of findings  and recommendations.     Disposition: SNF    Patient Instructions:    Stefania Fowler   Home Medication Instructions XLV:[id number]    Printed on:07/10/20 5072   Medication Information                      acetaminophen (TYLENOL) 325 MG tablet  Take 650 mg by mouth every 6 hours as needed for Pain             apixaban (ELIQUIS) 5 MG TABS tablet  Take 2.5 mg by mouth 2 times daily             aspirin 81 MG chewable tablet  Take 1 tablet by mouth daily             cefdinir (OMNICEF) 300 MG capsule  Take 1 capsule by mouth 2 times daily for 7 days             cholestyramine light 4 g packet  Take 1 packet by mouth 2 times daily             dicyclomine (BENTYL) 10 MG capsule  Take 1 capsule by mouth 3 times daily As needed for abdominal pain             digoxin (LANOXIN) 125 MCG tablet  Take 1 tablet by mouth daily             donepezil (ARICEPT) 10 MG tablet  Take 10 mg by mouth nightly             DULoxetine (CYMBALTA) 20 MG extended release capsule  Take 10 mg by mouth nightly             furosemide (LASIX) 40 MG tablet  Take 40 mg by mouth daily             gabapentin (NEURONTIN) 100 MG capsule  Take 2 capsules by mouth 3 times daily for 90 days. memantine (NAMENDA) 10 MG tablet  Take 10 mg by mouth 2 times daily             metFORMIN (GLUCOPHAGE) 1000 MG tablet  Take 1,000 mg by mouth daily             metoprolol tartrate (LOPRESSOR) 50 MG tablet  Take 1 tablet by mouth 2 times daily             midodrine (PROAMATINE) 10 MG tablet  Take 1 tablet by mouth 3 times daily (with meals)             Multiple Vitamins-Minerals (THERAPEUTIC MULTIVITAMIN-MINERALS) tablet  Take 1 tablet by mouth daily             omeprazole (PRILOSEC) 20 MG delayed release capsule  Take 1 capsule by mouth 2 times daily (before meals)             potassium chloride (MICRO-K) 10 MEQ extended release capsule  Take 10 mEq by mouth daily             tamsulosin (FLOMAX) 0.4 MG capsule  Take 0.4 mg by mouth nightly                    Activity: activity as tolerated  Diet: cardiac diet    Follow-up with Dr. Nery Kulkarni in 5 days    Signed: Electronically signed by Cecilia Mckeon PA-C on 7/10/2020 at 10:30 AM    Time spent on discharge 35 minutes  I have independently evaluated and examined this patient today. I have reviewed radiologic and biochemical tests on this patient. Management Plan is developed mutually with KYLE Spencer. I have reviewed above note and agree with assessment and plan. Discussed with hospice physician, Dr. Carrillo Ours, if needed hospice as a outpatient in nursing home.

## 2020-07-11 LAB
CULTURE: NORMAL
CULTURE: NORMAL
Lab: NORMAL
Lab: NORMAL
SPECIMEN: NORMAL
SPECIMEN: NORMAL

## 2020-07-15 LAB
EKG DIAGNOSIS: NORMAL
EKG Q-T INTERVAL: 334 MS
EKG QRS DURATION: 124 MS
EKG QTC CALCULATION (BAZETT): 497 MS
EKG R AXIS: -86 DEGREES
EKG T AXIS: 50 DEGREES
EKG VENTRICULAR RATE: 133 BPM

## 2020-07-17 ENCOUNTER — HOSPITAL ENCOUNTER (OUTPATIENT)
Age: 85
Setting detail: SPECIMEN
Discharge: HOME | End: 2020-07-17

## 2020-07-17 LAB
ALBUMIN SERPL-MCNC: 3.2 GM/DL (ref 3.4–5)
ALP BLD-CCNC: 60 IU/L (ref 40–129)
ALT SERPL-CCNC: 18 U/L (ref 10–40)
ANION GAP SERPL CALCULATED.3IONS-SCNC: 11 MMOL/L (ref 4–16)
AST SERPL-CCNC: 18 IU/L (ref 15–37)
BILIRUB SERPL-MCNC: 1 MG/DL (ref 0–1)
BUN BLDV-MCNC: 12 MG/DL (ref 6–23)
CALCIUM SERPL-MCNC: 8.4 MG/DL (ref 8.3–10.6)
CHLORIDE BLD-SCNC: 100 MMOL/L (ref 99–110)
CO2: 27 MMOL/L (ref 21–32)
CREAT SERPL-MCNC: 1.2 MG/DL (ref 0.9–1.3)
DIGOXIN LEVEL: 1.2 NG/ML (ref 0.8–2)
DOSE AMOUNT: NORMAL
DOSE TIME: NORMAL
GFR AFRICAN AMERICAN: >60 ML/MIN/1.73M2
GFR NON-AFRICAN AMERICAN: 58 ML/MIN/1.73M2
GLUCOSE BLD-MCNC: 90 MG/DL (ref 70–99)
HCT VFR BLD CALC: 31.2 % (ref 42–52)
HEMOGLOBIN: 10.1 GM/DL (ref 13.5–18)
MCH RBC QN AUTO: 32.3 PG (ref 27–31)
MCHC RBC AUTO-ENTMCNC: 32.4 % (ref 32–36)
MCV RBC AUTO: 99.7 FL (ref 78–100)
PDW BLD-RTO: 22 % (ref 11.7–14.9)
PLATELET # BLD: 175 K/CU MM (ref 140–440)
PMV BLD AUTO: 9.7 FL (ref 7.5–11.1)
POTASSIUM SERPL-SCNC: 4 MMOL/L (ref 3.5–5.1)
RBC # BLD: 3.13 M/CU MM (ref 4.6–6.2)
SODIUM BLD-SCNC: 138 MMOL/L (ref 135–145)
TOTAL PROTEIN: 5.5 GM/DL (ref 6.4–8.2)
WBC # BLD: 4.2 K/CU MM (ref 4–10.5)

## 2020-07-17 PROCEDURE — 36415 COLL VENOUS BLD VENIPUNCTURE: CPT

## 2020-07-17 PROCEDURE — 80053 COMPREHEN METABOLIC PANEL: CPT

## 2020-07-17 PROCEDURE — 80162 ASSAY OF DIGOXIN TOTAL: CPT

## 2020-07-17 PROCEDURE — 85027 COMPLETE CBC AUTOMATED: CPT

## 2020-07-22 ENCOUNTER — HOSPITAL ENCOUNTER (OUTPATIENT)
Age: 85
Setting detail: SPECIMEN
Discharge: HOME | End: 2020-07-22

## 2020-07-22 LAB
HCT VFR BLD CALC: 32.4 % (ref 42–52)
HEMOGLOBIN: 10.1 GM/DL (ref 13.5–18)
MCH RBC QN AUTO: 32 PG (ref 27–31)
MCHC RBC AUTO-ENTMCNC: 31.2 % (ref 32–36)
MCV RBC AUTO: 102.5 FL (ref 78–100)
PDW BLD-RTO: 21.8 % (ref 11.7–14.9)
PLATELET # BLD: 182 K/CU MM (ref 140–440)
PMV BLD AUTO: 10.5 FL (ref 7.5–11.1)
RBC # BLD: 3.16 M/CU MM (ref 4.6–6.2)
WBC # BLD: 4.9 K/CU MM (ref 4–10.5)

## 2020-07-22 PROCEDURE — 36415 COLL VENOUS BLD VENIPUNCTURE: CPT

## 2020-07-22 PROCEDURE — 85027 COMPLETE CBC AUTOMATED: CPT

## 2020-07-24 ENCOUNTER — HOSPITAL ENCOUNTER (OUTPATIENT)
Age: 85
Setting detail: SPECIMEN
Discharge: HOME | End: 2020-07-24

## 2020-07-24 LAB
ALBUMIN SERPL-MCNC: 3.4 GM/DL (ref 3.4–5)
ALP BLD-CCNC: 77 IU/L (ref 40–129)
ALT SERPL-CCNC: 19 U/L (ref 10–40)
ANION GAP SERPL CALCULATED.3IONS-SCNC: 14 MMOL/L (ref 4–16)
AST SERPL-CCNC: 20 IU/L (ref 15–37)
BILIRUB SERPL-MCNC: 1.1 MG/DL (ref 0–1)
BUN BLDV-MCNC: 14 MG/DL (ref 6–23)
CALCIUM SERPL-MCNC: 8.7 MG/DL (ref 8.3–10.6)
CHLORIDE BLD-SCNC: 100 MMOL/L (ref 99–110)
CO2: 26 MMOL/L (ref 21–32)
CREAT SERPL-MCNC: 1.3 MG/DL (ref 0.9–1.3)
DIGOXIN LEVEL: 1.1 NG/ML (ref 0.8–2)
DOSE AMOUNT: NORMAL
DOSE TIME: NORMAL
GFR AFRICAN AMERICAN: >60 ML/MIN/1.73M2
GFR NON-AFRICAN AMERICAN: 52 ML/MIN/1.73M2
GLUCOSE BLD-MCNC: 255 MG/DL (ref 70–99)
HCT VFR BLD CALC: 32.8 % (ref 42–52)
HEMOGLOBIN: 10.5 GM/DL (ref 13.5–18)
LACTATE: 3.8 MMOL/L (ref 0.4–2)
MCH RBC QN AUTO: 32.7 PG (ref 27–31)
MCHC RBC AUTO-ENTMCNC: 32 % (ref 32–36)
MCV RBC AUTO: 102.2 FL (ref 78–100)
PDW BLD-RTO: 22.1 % (ref 11.7–14.9)
PLATELET # BLD: 188 K/CU MM (ref 140–440)
PMV BLD AUTO: 10.2 FL (ref 7.5–11.1)
POTASSIUM SERPL-SCNC: 3.8 MMOL/L (ref 3.5–5.1)
RBC # BLD: 3.21 M/CU MM (ref 4.6–6.2)
SODIUM BLD-SCNC: 140 MMOL/L (ref 135–145)
TOTAL PROTEIN: 5.7 GM/DL (ref 6.4–8.2)
WBC # BLD: 4.2 K/CU MM (ref 4–10.5)

## 2020-07-24 PROCEDURE — 85027 COMPLETE CBC AUTOMATED: CPT

## 2020-07-24 PROCEDURE — 80053 COMPREHEN METABOLIC PANEL: CPT

## 2020-07-24 PROCEDURE — 36415 COLL VENOUS BLD VENIPUNCTURE: CPT

## 2020-07-24 PROCEDURE — 83605 ASSAY OF LACTIC ACID: CPT

## 2020-07-24 PROCEDURE — 80162 ASSAY OF DIGOXIN TOTAL: CPT

## 2020-07-27 ENCOUNTER — HOSPITAL ENCOUNTER (OUTPATIENT)
Age: 85
Setting detail: SPECIMEN
Discharge: HOME | End: 2020-07-27

## 2020-07-27 LAB
ANION GAP SERPL CALCULATED.3IONS-SCNC: 11 MMOL/L (ref 4–16)
BETA-HYDROXYBUTYRATE: 3.3 MG/DL (ref 0–3)
BUN BLDV-MCNC: 14 MG/DL (ref 6–23)
CALCIUM SERPL-MCNC: 8.6 MG/DL (ref 8.3–10.6)
CHLORIDE BLD-SCNC: 97 MMOL/L (ref 99–110)
CO2: 32 MMOL/L (ref 21–32)
CREAT SERPL-MCNC: 1.3 MG/DL (ref 0.9–1.3)
GFR AFRICAN AMERICAN: >60 ML/MIN/1.73M2
GFR NON-AFRICAN AMERICAN: 52 ML/MIN/1.73M2
GLUCOSE BLD-MCNC: 106 MG/DL (ref 70–99)
HCT VFR BLD CALC: 35.5 % (ref 42–52)
HEMOGLOBIN: 11.3 GM/DL (ref 13.5–18)
LACTATE: 1.4 MMOL/L (ref 0.4–2)
MCH RBC QN AUTO: 32.4 PG (ref 27–31)
MCHC RBC AUTO-ENTMCNC: 31.8 % (ref 32–36)
MCV RBC AUTO: 101.7 FL (ref 78–100)
PDW BLD-RTO: 22 % (ref 11.7–14.9)
PLATELET # BLD: 186 K/CU MM (ref 140–440)
PMV BLD AUTO: 9.8 FL (ref 7.5–11.1)
POTASSIUM SERPL-SCNC: 3.7 MMOL/L (ref 3.5–5.1)
RBC # BLD: 3.49 M/CU MM (ref 4.6–6.2)
SODIUM BLD-SCNC: 140 MMOL/L (ref 135–145)
WBC # BLD: 5.1 K/CU MM (ref 4–10.5)

## 2020-07-27 PROCEDURE — 83605 ASSAY OF LACTIC ACID: CPT

## 2020-07-27 PROCEDURE — 36415 COLL VENOUS BLD VENIPUNCTURE: CPT

## 2020-07-27 PROCEDURE — 82010 KETONE BODYS QUAN: CPT

## 2020-07-27 PROCEDURE — 80048 BASIC METABOLIC PNL TOTAL CA: CPT

## 2020-07-27 PROCEDURE — 85027 COMPLETE CBC AUTOMATED: CPT

## 2020-07-30 ENCOUNTER — HOSPITAL ENCOUNTER (OUTPATIENT)
Age: 85
Setting detail: SPECIMEN
Discharge: HOME | End: 2020-07-30

## 2020-07-30 LAB
ALBUMIN SERPL-MCNC: 3.5 GM/DL (ref 3.4–5)
ALP BLD-CCNC: 77 IU/L (ref 40–128)
ALT SERPL-CCNC: 13 U/L (ref 10–40)
ANION GAP SERPL CALCULATED.3IONS-SCNC: 12 MMOL/L (ref 4–16)
AST SERPL-CCNC: 17 IU/L (ref 15–37)
BILIRUB SERPL-MCNC: 1.5 MG/DL (ref 0–1)
BUN BLDV-MCNC: 19 MG/DL (ref 6–23)
CALCIUM SERPL-MCNC: 8.6 MG/DL (ref 8.3–10.6)
CHLORIDE BLD-SCNC: 98 MMOL/L (ref 99–110)
CO2: 29 MMOL/L (ref 21–32)
CREAT SERPL-MCNC: 1.2 MG/DL (ref 0.9–1.3)
GFR AFRICAN AMERICAN: >60 ML/MIN/1.73M2
GFR NON-AFRICAN AMERICAN: 58 ML/MIN/1.73M2
GLUCOSE BLD-MCNC: 109 MG/DL (ref 70–99)
HCT VFR BLD CALC: 35.7 % (ref 42–52)
HEMOGLOBIN: 11.3 GM/DL (ref 13.5–18)
LACTATE: 1.4 MMOL/L (ref 0.4–2)
MCH RBC QN AUTO: 32.8 PG (ref 27–31)
MCHC RBC AUTO-ENTMCNC: 31.7 % (ref 32–36)
MCV RBC AUTO: 103.8 FL (ref 78–100)
PDW BLD-RTO: 21.9 % (ref 11.7–14.9)
PLATELET # BLD: 205 K/CU MM (ref 140–440)
PMV BLD AUTO: 10.5 FL (ref 7.5–11.1)
POTASSIUM SERPL-SCNC: 3.8 MMOL/L (ref 3.5–5.1)
RBC # BLD: 3.44 M/CU MM (ref 4.6–6.2)
SODIUM BLD-SCNC: 139 MMOL/L (ref 135–145)
TOTAL PROTEIN: 5.8 GM/DL (ref 6.4–8.2)
WBC # BLD: 5.2 K/CU MM (ref 4–10.5)

## 2020-07-30 PROCEDURE — 80053 COMPREHEN METABOLIC PANEL: CPT

## 2020-07-30 PROCEDURE — 83605 ASSAY OF LACTIC ACID: CPT

## 2020-07-30 PROCEDURE — 85027 COMPLETE CBC AUTOMATED: CPT

## 2020-07-30 PROCEDURE — 36415 COLL VENOUS BLD VENIPUNCTURE: CPT

## 2020-07-30 PROCEDURE — 80162 ASSAY OF DIGOXIN TOTAL: CPT

## 2020-07-31 LAB
DIGOXIN LEVEL: 1.1 NG/ML (ref 0.8–2)
DOSE AMOUNT: NORMAL
DOSE TIME: NORMAL

## 2020-11-03 PROBLEM — J18.9 PNA (PNEUMONIA): Status: RESOLVED | Noted: 2020-06-19 | Resolved: 2020-11-03
